# Patient Record
Sex: FEMALE | NOT HISPANIC OR LATINO | Employment: OTHER | URBAN - NONMETROPOLITAN AREA
[De-identification: names, ages, dates, MRNs, and addresses within clinical notes are randomized per-mention and may not be internally consistent; named-entity substitution may affect disease eponyms.]

---

## 2018-02-01 ENCOUNTER — APPOINTMENT (OUTPATIENT)
Dept: GENERAL RADIOLOGY | Facility: HOSPITAL | Age: 83
End: 2018-02-01

## 2018-02-01 ENCOUNTER — APPOINTMENT (OUTPATIENT)
Dept: CT IMAGING | Facility: HOSPITAL | Age: 83
End: 2018-02-01

## 2018-02-01 ENCOUNTER — HOSPITAL ENCOUNTER (INPATIENT)
Facility: HOSPITAL | Age: 83
LOS: 6 days | Discharge: NURSING, REHAB OR LONG-TERM CARE | End: 2018-02-07
Attending: EMERGENCY MEDICINE | Admitting: INTERNAL MEDICINE

## 2018-02-01 DIAGNOSIS — Z74.09 IMPAIRED MOBILITY AND ADLS: ICD-10-CM

## 2018-02-01 DIAGNOSIS — J18.9 PNEUMONIA OF BOTH LUNGS DUE TO INFECTIOUS ORGANISM, UNSPECIFIED PART OF LUNG: ICD-10-CM

## 2018-02-01 DIAGNOSIS — R41.89 IMPAIRED COGNITION: ICD-10-CM

## 2018-02-01 DIAGNOSIS — R26.9 ABNORMALITY OF GAIT AND MOBILITY: ICD-10-CM

## 2018-02-01 DIAGNOSIS — S72.002A CLOSED FRACTURE OF LEFT HIP, INITIAL ENCOUNTER (HCC): ICD-10-CM
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
## 2018-02-01 DIAGNOSIS — R13.10 DYSPHAGIA, UNSPECIFIED TYPE: ICD-10-CM

## 2018-02-01 DIAGNOSIS — N39.0 ACUTE UTI: ICD-10-CM

## 2018-02-01 DIAGNOSIS — Z78.9 IMPAIRED MOBILITY AND ADLS: ICD-10-CM

## 2018-02-01 LAB
ABO GROUP BLD: NORMAL
ALBUMIN SERPL-MCNC: 3.7 G/DL (ref 3.5–5)
ALBUMIN/GLOB SERPL: 0.9 G/DL (ref 1.1–2.5)
ALP SERPL-CCNC: 94 U/L (ref 24–120)
ALT SERPL W P-5'-P-CCNC: 36 U/L (ref 0–54)
ANION GAP SERPL CALCULATED.3IONS-SCNC: 15 MMOL/L (ref 4–13)
APTT PPP: 32 SECONDS (ref 24.1–34.8)
AST SERPL-CCNC: 64 U/L (ref 7–45)
BACTERIA UR QL AUTO: ABNORMAL /HPF
BASOPHILS # BLD AUTO: 0.02 10*3/MM3 (ref 0–0.2)
BASOPHILS NFR BLD AUTO: 0.1 % (ref 0–2)
BILIRUB SERPL-MCNC: 0.9 MG/DL (ref 0.1–1)
BILIRUB UR QL STRIP: NEGATIVE
BLD GP AB SCN SERPL QL: NEGATIVE
BUN BLD-MCNC: 34 MG/DL (ref 5–21)
BUN/CREAT SERPL: 40 (ref 7–25)
CALCIUM SPEC-SCNC: 8.7 MG/DL (ref 8.4–10.4)
CHLORIDE SERPL-SCNC: 97 MMOL/L (ref 98–110)
CK SERPL-CCNC: 746 U/L (ref 0–203)
CLARITY UR: CLEAR
CO2 SERPL-SCNC: 27 MMOL/L (ref 24–31)
COLOR UR: YELLOW
CREAT BLD-MCNC: 0.85 MG/DL (ref 0.5–1.4)
DEPRECATED RDW RBC AUTO: 48.9 FL (ref 40–54)
EOSINOPHIL # BLD AUTO: 0 10*3/MM3 (ref 0–0.7)
EOSINOPHIL NFR BLD AUTO: 0 % (ref 0–4)
ERYTHROCYTE [DISTWIDTH] IN BLOOD BY AUTOMATED COUNT: 14.2 % (ref 12–15)
GFR SERPL CREATININE-BSD FRML MDRD: 63 ML/MIN/1.73
GLOBULIN UR ELPH-MCNC: 3.9 GM/DL
GLUCOSE BLD-MCNC: 117 MG/DL (ref 70–100)
GLUCOSE UR STRIP-MCNC: NEGATIVE MG/DL
HCT VFR BLD AUTO: 36.8 % (ref 37–47)
HGB BLD-MCNC: 11.9 G/DL (ref 12–16)
HGB UR QL STRIP.AUTO: ABNORMAL
HYALINE CASTS UR QL AUTO: ABNORMAL /LPF
IMM GRANULOCYTES # BLD: 0.05 10*3/MM3 (ref 0–0.03)
IMM GRANULOCYTES NFR BLD: 0.3 % (ref 0–5)
INR PPP: 1.06 (ref 0.91–1.09)
KETONES UR QL STRIP: ABNORMAL
LEUKOCYTE ESTERASE UR QL STRIP.AUTO: ABNORMAL
LYMPHOCYTES # BLD AUTO: 1.07 10*3/MM3 (ref 0.72–4.86)
LYMPHOCYTES NFR BLD AUTO: 6.9 % (ref 15–45)
MCH RBC QN AUTO: 30.8 PG (ref 28–32)
MCHC RBC AUTO-ENTMCNC: 32.3 G/DL (ref 33–36)
MCV RBC AUTO: 95.3 FL (ref 82–98)
MONOCYTES # BLD AUTO: 0.79 10*3/MM3 (ref 0.19–1.3)
MONOCYTES NFR BLD AUTO: 5.1 % (ref 4–12)
NEUTROPHILS # BLD AUTO: 13.59 10*3/MM3 (ref 1.87–8.4)
NEUTROPHILS NFR BLD AUTO: 87.6 % (ref 39–78)
NITRITE UR QL STRIP: POSITIVE
NRBC BLD MANUAL-RTO: 0 /100 WBC (ref 0–0)
PH UR STRIP.AUTO: 5.5 [PH] (ref 5–8)
PLATELET # BLD AUTO: 460 10*3/MM3 (ref 130–400)
PMV BLD AUTO: 9.2 FL (ref 6–12)
POTASSIUM BLD-SCNC: 4.4 MMOL/L (ref 3.5–5.3)
PROT SERPL-MCNC: 7.6 G/DL (ref 6.3–8.7)
PROT UR QL STRIP: ABNORMAL
PROTHROMBIN TIME: 14.1 SECONDS (ref 11.9–14.6)
RBC # BLD AUTO: 3.86 10*6/MM3 (ref 4.2–5.4)
RBC # UR: ABNORMAL /HPF
REF LAB TEST METHOD: ABNORMAL
RH BLD: POSITIVE
SODIUM BLD-SCNC: 139 MMOL/L (ref 135–145)
SP GR UR STRIP: 1.02 (ref 1–1.03)
SQUAMOUS #/AREA URNS HPF: ABNORMAL /HPF
UROBILINOGEN UR QL STRIP: ABNORMAL
WBC NRBC COR # BLD: 15.52 10*3/MM3 (ref 4.8–10.8)
WBC UR QL AUTO: ABNORMAL /HPF

## 2018-02-01 PROCEDURE — 99284 EMERGENCY DEPT VISIT MOD MDM: CPT

## 2018-02-01 PROCEDURE — 73502 X-RAY EXAM HIP UNI 2-3 VIEWS: CPT

## 2018-02-01 PROCEDURE — 87086 URINE CULTURE/COLONY COUNT: CPT | Performed by: EMERGENCY MEDICINE

## 2018-02-01 PROCEDURE — 71045 X-RAY EXAM CHEST 1 VIEW: CPT

## 2018-02-01 PROCEDURE — 87186 SC STD MICRODIL/AGAR DIL: CPT | Performed by: EMERGENCY MEDICINE

## 2018-02-01 PROCEDURE — 80053 COMPREHEN METABOLIC PANEL: CPT | Performed by: EMERGENCY MEDICINE

## 2018-02-01 PROCEDURE — 87088 URINE BACTERIA CULTURE: CPT | Performed by: EMERGENCY MEDICINE

## 2018-02-01 PROCEDURE — 86900 BLOOD TYPING SEROLOGIC ABO: CPT | Performed by: EMERGENCY MEDICINE

## 2018-02-01 PROCEDURE — 85730 THROMBOPLASTIN TIME PARTIAL: CPT | Performed by: EMERGENCY MEDICINE

## 2018-02-01 PROCEDURE — 82550 ASSAY OF CK (CPK): CPT | Performed by: EMERGENCY MEDICINE

## 2018-02-01 PROCEDURE — 25010000002 ENOXAPARIN PER 10 MG: Performed by: INTERNAL MEDICINE

## 2018-02-01 PROCEDURE — 25010000002 CEFTRIAXONE PER 250 MG: Performed by: EMERGENCY MEDICINE

## 2018-02-01 PROCEDURE — P9612 CATHETERIZE FOR URINE SPEC: HCPCS

## 2018-02-01 PROCEDURE — 86901 BLOOD TYPING SEROLOGIC RH(D): CPT | Performed by: EMERGENCY MEDICINE

## 2018-02-01 PROCEDURE — 70450 CT HEAD/BRAIN W/O DYE: CPT

## 2018-02-01 PROCEDURE — 86850 RBC ANTIBODY SCREEN: CPT | Performed by: EMERGENCY MEDICINE

## 2018-02-01 PROCEDURE — 85025 COMPLETE CBC W/AUTO DIFF WBC: CPT | Performed by: EMERGENCY MEDICINE

## 2018-02-01 PROCEDURE — 85610 PROTHROMBIN TIME: CPT | Performed by: EMERGENCY MEDICINE

## 2018-02-01 PROCEDURE — 87040 BLOOD CULTURE FOR BACTERIA: CPT | Performed by: EMERGENCY MEDICINE

## 2018-02-01 PROCEDURE — 81001 URINALYSIS AUTO W/SCOPE: CPT | Performed by: EMERGENCY MEDICINE

## 2018-02-01 RX ORDER — NALOXONE HCL 0.4 MG/ML
0.4 VIAL (ML) INJECTION
Status: DISCONTINUED | OUTPATIENT
Start: 2018-02-01 | End: 2018-02-07 | Stop reason: HOSPADM

## 2018-02-01 RX ORDER — ONDANSETRON 2 MG/ML
4 INJECTION INTRAMUSCULAR; INTRAVENOUS EVERY 6 HOURS PRN
Status: DISCONTINUED | OUTPATIENT
Start: 2018-02-01 | End: 2018-02-07 | Stop reason: HOSPADM

## 2018-02-01 RX ORDER — SODIUM CHLORIDE 0.9 % (FLUSH) 0.9 %
1-10 SYRINGE (ML) INJECTION AS NEEDED
Status: DISCONTINUED | OUTPATIENT
Start: 2018-02-01 | End: 2018-02-07 | Stop reason: HOSPADM

## 2018-02-01 RX ORDER — BISOPROLOL FUMARATE AND HYDROCHLOROTHIAZIDE 5; 6.25 MG/1; MG/1
1 TABLET ORAL DAILY
COMMUNITY
End: 2020-08-17 | Stop reason: HOSPADM

## 2018-02-01 RX ORDER — MORPHINE SULFATE 4 MG/ML
4 INJECTION, SOLUTION INTRAMUSCULAR; INTRAVENOUS EVERY 4 HOURS PRN
Status: DISCONTINUED | OUTPATIENT
Start: 2018-02-01 | End: 2018-02-07 | Stop reason: HOSPADM

## 2018-02-01 RX ORDER — SODIUM CHLORIDE 0.9 % (FLUSH) 0.9 %
10 SYRINGE (ML) INJECTION AS NEEDED
Status: DISCONTINUED | OUTPATIENT
Start: 2018-02-01 | End: 2018-02-07 | Stop reason: HOSPADM

## 2018-02-01 RX ORDER — LEVOTHYROXINE SODIUM 0.1 MG/1
100 TABLET ORAL
Status: DISCONTINUED | OUTPATIENT
Start: 2018-02-01 | End: 2018-02-07 | Stop reason: HOSPADM

## 2018-02-01 RX ORDER — LEVOTHYROXINE SODIUM 125 UG/1
125 CAPSULE ORAL DAILY
COMMUNITY
End: 2020-08-17 | Stop reason: HOSPADM

## 2018-02-01 RX ORDER — ATORVASTATIN CALCIUM 20 MG/1
20 TABLET, FILM COATED ORAL DAILY
Status: ON HOLD | COMMUNITY
End: 2020-08-11

## 2018-02-01 RX ADMIN — LEVOTHYROXINE SODIUM 100 MCG: 100 TABLET ORAL at 20:38

## 2018-02-01 RX ADMIN — BISOPROLOL FUMARATE: 5 TABLET ORAL at 20:38

## 2018-02-01 RX ADMIN — CEFTRIAXONE SODIUM 1 G: 1 INJECTION, POWDER, FOR SOLUTION INTRAMUSCULAR; INTRAVENOUS at 17:14

## 2018-02-01 RX ADMIN — ENOXAPARIN SODIUM 30 MG: 30 INJECTION SUBCUTANEOUS at 20:41

## 2018-02-01 NOTE — ED PROVIDER NOTES
Subjective patient is an 88-year-old female who presents to the ER status post a fall.  Per EMS, patient was found lying in her floor today by her neighbors.  Patient states she fell sometime yesterday but could not get out of the floor.  Patient complains of left hip pain.  Patient states she did not hit her head or have any loss of consciousness but she is unsure of how she fell.  Patient does live alone.  Patient denies any other complaints.  She denies any fever, chest pain, shortness of air, abdominal pain, nausea vomiting diarrhea, urinary changes, neurological changes, neck or back pain, other ext pain.  Patient is a poor historian.    History provided by:  Patient   used: No        Review of Systems   Constitutional: Negative.    HENT: Negative.    Eyes: Negative.    Respiratory: Negative.    Cardiovascular: Negative.    Gastrointestinal: Negative.    Endocrine: Negative.    Genitourinary: Negative.    Musculoskeletal: Positive for arthralgias and myalgias.   Skin: Negative.    Allergic/Immunologic: Negative.    Neurological: Negative.    Hematological: Negative.    Psychiatric/Behavioral: Negative.    All other systems reviewed and are negative.      No past medical history on file.    No Known Allergies    No past surgical history on file.    No family history on file.    Social History     Social History   • Marital status:      Spouse name: N/A   • Number of children: N/A   • Years of education: N/A     Social History Main Topics   • Smoking status: Not on file   • Smokeless tobacco: Not on file   • Alcohol use Not on file   • Drug use: Not on file   • Sexual activity: Not on file     Other Topics Concern   • Not on file     Social History Narrative           Objective   Physical Exam   Constitutional: She appears well-developed and well-nourished.   Smells of urine   HENT:   Head: Normocephalic and atraumatic.   Eyes: Conjunctivae are normal. Pupils are equal, round, and  reactive to light.   Neck: Normal range of motion.   Cardiovascular: Normal rate, regular rhythm and normal heart sounds.    Pulmonary/Chest: Effort normal and breath sounds normal.   Abdominal: Soft. There is no tenderness.   Genitourinary:   Genitourinary Comments: Erythema and excoriation to the labia and perineum   Musculoskeletal: Normal range of motion.   Left lower extremity is shortened and externally rotated, tender to palpation left lateral hip, ecchymosis to the bilateral forearms and elbows, nontender to palpation elsewhere, neurovascularly intact, no CT or L-spine tenderness   Neurological: She is alert. She has normal strength.   Skin: Skin is warm.   3+ pitting edema BLE   Psychiatric: She has a normal mood and affect. Her behavior is normal.   Nursing note and vitals reviewed.      Procedures         ED Course  ED Course      Lab Results (last 24 hours)     Procedure Component Value Units Date/Time    CBC & Differential [[id number]] Collected:  02/01/18 1510    Specimen:  Blood Updated:  02/01/18 1519    Narrative:       The following orders were created for panel order CBC & Differential.  Procedure                               Abnormality         Status                     ---------                               -----------         ------                     CBC Auto Differential[[id number]]         Abnormal            Final result                 Please view results for these tests on the individual orders.    Comprehensive Metabolic Panel [[id number]]  (Abnormal) Collected:  02/01/18 1510    Specimen:  Blood Updated:  02/01/18 1531     Glucose 117 (H) mg/dL      BUN 34 (H) mg/dL      Creatinine 0.85 mg/dL      Sodium 139 mmol/L      Potassium 4.4 mmol/L      Chloride 97 (L) mmol/L      CO2 27.0 mmol/L      Calcium 8.7 mg/dL      Total Protein 7.6 g/dL      Albumin 3.70 g/dL      ALT (SGPT) 36 U/L      AST (SGOT) 64 (H) U/L      Alkaline Phosphatase 94 U/L      Total Bilirubin 0.9 mg/dL      eGFR Non   Amer 63 mL/min/1.73      Globulin 3.9 gm/dL      A/G Ratio 0.9 (L) g/dL      BUN/Creatinine Ratio 40.0 (H)     Anion Gap 15.0 (H) mmol/L     Narrative:       The MDRD GFR formula is only valid for adults with stable renal function between ages 18 and 70.    CK [[id number]]  (Abnormal) Collected:  02/01/18 1510    Specimen:  Blood Updated:  02/01/18 1531     Creatine Kinase 746 (H) U/L     Protime-INR [[id number]]  (Normal) Collected:  02/01/18 1510    Specimen:  Blood Updated:  02/01/18 1528     Protime 14.1 Seconds      INR 1.06    aPTT [[id number]]  (Normal) Collected:  02/01/18 1510    Specimen:  Blood Updated:  02/01/18 1528     PTT 32.0 seconds     CBC Auto Differential [[id number]]  (Abnormal) Collected:  02/01/18 1510    Specimen:  Blood Updated:  02/01/18 1519     WBC 15.52 (H) 10*3/mm3      RBC 3.86 (L) 10*6/mm3      Hemoglobin 11.9 (L) g/dL      Hematocrit 36.8 (L) %      MCV 95.3 fL      MCH 30.8 pg      MCHC 32.3 (L) g/dL      RDW 14.2 %      RDW-SD 48.9 fl      MPV 9.2 fL      Platelets 460 (H) 10*3/mm3      Neutrophil % 87.6 (H) %      Lymphocyte % 6.9 (L) %      Monocyte % 5.1 %      Eosinophil % 0.0 %      Basophil % 0.1 %      Immature Grans % 0.3 %      Neutrophils, Absolute 13.59 (H) 10*3/mm3      Lymphocytes, Absolute 1.07 10*3/mm3      Monocytes, Absolute 0.79 10*3/mm3      Eosinophils, Absolute 0.00 10*3/mm3      Basophils, Absolute 0.02 10*3/mm3      Immature Grans, Absolute 0.05 (H) 10*3/mm3      nRBC 0.0 /100 WBC     Urinalysis With / Culture If Indicated - Urine, Catheter [[id number]]  (Abnormal) Collected:  02/01/18 1656    Specimen:  Urine from Urine, Catheter Updated:  02/01/18 1706     Color, UA Yellow     Appearance, UA Clear     pH, UA 5.5     Specific Gravity, UA 1.024     Glucose, UA Negative     Ketones, UA 15 mg/dL (1+) (A)     Bilirubin, UA Negative     Blood, UA Small (1+) (A)     Protein, UA 30 mg/dL (1+) (A)     Leuk Esterase, UA Trace (A)     Nitrite, UA Positive (A)      Urobilinogen, UA 0.2 E.U./dL    Urine Culture - Urine, Urine, Clean Catch [[id number]] Collected:  02/01/18 1656    Specimen:  Urine from Urine, Catheter Updated:  02/01/18 1704    Urinalysis, Microscopic Only - Urine, Clean Catch [[id number]]  (Abnormal) Collected:  02/01/18 1656    Specimen:  Urine from Urine, Catheter Updated:  02/01/18 1706     RBC, UA 0-2 (A) /HPF      WBC, UA 13-20 (A) /HPF      Bacteria, UA 4+ (A) /HPF      Squamous Epithelial Cells, UA 3-6 (A) /HPF      Hyaline Casts, UA 3-6 /LPF      Methodology Automated Microscopy    Blood Culture - Blood, [[id number]] Collected:  02/01/18 1708    Specimen:  Blood from Arm, Right Updated:  02/01/18 1721    Blood Culture - Blood, [[id number]] Collected:  02/01/18 1713    Specimen:  Blood from Arm, Left Updated:  02/01/18 1721        XR Chest 1 View   Final Result   Impression: Patchy opacities bilaterally are concerning for an   infectious or inflammatory process.   This report was finalized on 02/01/2018 16:13 by Dr. Cody Stanley MD.      XR Hip With or Without Pelvis 2 - 3 View Left   Final Result   1. Impacted left subcapital femoral neck fracture.   This report was finalized on 02/01/2018 16:13 by Dr. Shasta Crawford MD.      CT Head Without Contrast   Final Result   1. Mild to moderate cortical atrophy, greatest within the frontal   region. No acute intracranial abnormality.   2. Persistent chronic sphenoid opacification, chronic sinusitis.   This report was finalized on 02/01/2018 15:44 by Dr. Shasta Crawford MD.      Labs showed leukocytosis.  CT head showed chronic sinusitis but was otherwise negative.  Chest x-ray showed bilateral patchy opacities concerning for pneumonia.  Blood cultures were obtained and patient was given Rocephin.  Left hip x-ray showed an impacted left subcapital femoral neck fracture.  Discussed case with Dr. Barger with orthopedic surgery.  He plans to operate on the patient tomorrow.  Patient is to be nothing by mouth after  midnight.  Urinalysis also positive for urinary tract infection.  Urine culture Sent.  Patient was then admitted to Dr Myles service for further treatment.            MDM  Number of Diagnoses or Management Options      Final diagnoses:   Fall, initial encounter   Closed fracture of left hip, initial encounter   Pneumonia of both lungs due to infectious organism, unspecified part of lung   Acute UTI            Gabriela Barrett MD  02/01/18 7898       Gabriela Barrett MD  02/01/18 6850

## 2018-02-02 ENCOUNTER — ANESTHESIA EVENT (OUTPATIENT)
Dept: PERIOP | Facility: HOSPITAL | Age: 83
End: 2018-02-02

## 2018-02-02 ENCOUNTER — ANESTHESIA (OUTPATIENT)
Dept: PERIOP | Facility: HOSPITAL | Age: 83
End: 2018-02-02

## 2018-02-02 LAB
25(OH)D3 SERPL-MCNC: 14.5 NG/ML (ref 30–100)
ANION GAP SERPL CALCULATED.3IONS-SCNC: 13 MMOL/L (ref 4–13)
BACTERIA SPEC AEROBE CULT: ABNORMAL
BACTERIA SPEC AEROBE CULT: ABNORMAL
BUN BLD-MCNC: 32 MG/DL (ref 5–21)
BUN/CREAT SERPL: 32 (ref 7–25)
CALCIUM SPEC-SCNC: 8.7 MG/DL (ref 8.4–10.4)
CHLORIDE SERPL-SCNC: 99 MMOL/L (ref 98–110)
CO2 SERPL-SCNC: 28 MMOL/L (ref 24–31)
CREAT BLD-MCNC: 1 MG/DL (ref 0.5–1.4)
DEPRECATED RDW RBC AUTO: 49.8 FL (ref 40–54)
ERYTHROCYTE [DISTWIDTH] IN BLOOD BY AUTOMATED COUNT: 14 % (ref 12–15)
FOLATE SERPL-MCNC: >20 NG/ML
GFR SERPL CREATININE-BSD FRML MDRD: 52 ML/MIN/1.73
GLUCOSE BLD-MCNC: 80 MG/DL (ref 70–100)
HCT VFR BLD AUTO: 34.4 % (ref 37–47)
HGB BLD-MCNC: 11.1 G/DL (ref 12–16)
IRON 24H UR-MRATE: 28 MCG/DL (ref 42–180)
IRON SATN MFR SERPL: 11 % (ref 20–45)
MCH RBC QN AUTO: 31.1 PG (ref 28–32)
MCHC RBC AUTO-ENTMCNC: 32.3 G/DL (ref 33–36)
MCV RBC AUTO: 96.4 FL (ref 82–98)
PLATELET # BLD AUTO: 443 10*3/MM3 (ref 130–400)
PMV BLD AUTO: 9.3 FL (ref 6–12)
POTASSIUM BLD-SCNC: 3.9 MMOL/L (ref 3.5–5.3)
RBC # BLD AUTO: 3.57 10*6/MM3 (ref 4.2–5.4)
SODIUM BLD-SCNC: 140 MMOL/L (ref 135–145)
TIBC SERPL-MCNC: 261 MCG/DL (ref 225–420)
VIT B12 BLD-MCNC: 830 PG/ML (ref 239–931)
WBC NRBC COR # BLD: 13.94 10*3/MM3 (ref 4.8–10.8)

## 2018-02-02 PROCEDURE — 82607 VITAMIN B-12: CPT | Performed by: INTERNAL MEDICINE

## 2018-02-02 PROCEDURE — 25010000002 NEOSTIGMINE PER 0.5 MG: Performed by: NURSE ANESTHETIST, CERTIFIED REGISTERED

## 2018-02-02 PROCEDURE — 82306 VITAMIN D 25 HYDROXY: CPT | Performed by: INTERNAL MEDICINE

## 2018-02-02 PROCEDURE — 0SRS0JA REPLACEMENT OF LEFT HIP JOINT, FEMORAL SURFACE WITH SYNTHETIC SUBSTITUTE, UNCEMENTED, OPEN APPROACH: ICD-10-PCS | Performed by: ORTHOPAEDIC SURGERY

## 2018-02-02 PROCEDURE — 83550 IRON BINDING TEST: CPT | Performed by: INTERNAL MEDICINE

## 2018-02-02 PROCEDURE — 25010000003 CEFAZOLIN PER 500 MG: Performed by: ORTHOPAEDIC SURGERY

## 2018-02-02 PROCEDURE — 25010000002 FENTANYL CITRATE (PF) 250 MCG/5ML SOLUTION: Performed by: NURSE ANESTHETIST, CERTIFIED REGISTERED

## 2018-02-02 PROCEDURE — 83540 ASSAY OF IRON: CPT | Performed by: INTERNAL MEDICINE

## 2018-02-02 PROCEDURE — 80048 BASIC METABOLIC PNL TOTAL CA: CPT | Performed by: INTERNAL MEDICINE

## 2018-02-02 PROCEDURE — 25010000002 PROPOFOL 10 MG/ML EMULSION: Performed by: NURSE ANESTHETIST, CERTIFIED REGISTERED

## 2018-02-02 PROCEDURE — 25010000002 SUCCINYLCHOLINE PER 20 MG: Performed by: NURSE ANESTHETIST, CERTIFIED REGISTERED

## 2018-02-02 PROCEDURE — 85027 COMPLETE CBC AUTOMATED: CPT | Performed by: INTERNAL MEDICINE

## 2018-02-02 PROCEDURE — C1776 JOINT DEVICE (IMPLANTABLE): HCPCS | Performed by: ORTHOPAEDIC SURGERY

## 2018-02-02 PROCEDURE — 82746 ASSAY OF FOLIC ACID SERUM: CPT | Performed by: INTERNAL MEDICINE

## 2018-02-02 PROCEDURE — 25010000003 CEFAZOLIN PER 500 MG: Performed by: NURSE ANESTHETIST, CERTIFIED REGISTERED

## 2018-02-02 PROCEDURE — 93010 ELECTROCARDIOGRAM REPORT: CPT | Performed by: INTERNAL MEDICINE

## 2018-02-02 PROCEDURE — 93005 ELECTROCARDIOGRAM TRACING: CPT | Performed by: ORTHOPAEDIC SURGERY

## 2018-02-02 DEVICE — PRT HIP BIPOL PRIM DEPUY 9525109/9525107: Type: IMPLANTABLE DEVICE | Status: FUNCTIONAL

## 2018-02-02 DEVICE — TRI-LOCK BPS FEMORAL STEM 12/14 TAPER TRI-LOCK BPS W/GRIPTION SIZE 2 STD 99MM
Type: IMPLANTABLE DEVICE | Status: FUNCTIONAL
Brand: TRI-LOCK GRIPTION

## 2018-02-02 DEVICE — SELF CENTERING BI-POLAR HEAD 28MM ID 43MM OD
Type: IMPLANTABLE DEVICE | Status: FUNCTIONAL
Brand: SELF CENTERING

## 2018-02-02 DEVICE — ARTICUL/EZE FEMORAL HEAD DIAMETER 28MM +5 12/14 TAPER
Type: IMPLANTABLE DEVICE | Status: FUNCTIONAL
Brand: ARTICUL/EZE

## 2018-02-02 RX ORDER — MAGNESIUM HYDROXIDE 1200 MG/15ML
LIQUID ORAL AS NEEDED
Status: DISCONTINUED | OUTPATIENT
Start: 2018-02-02 | End: 2018-02-02 | Stop reason: HOSPADM

## 2018-02-02 RX ORDER — ROCURONIUM BROMIDE 10 MG/ML
INJECTION, SOLUTION INTRAVENOUS AS NEEDED
Status: DISCONTINUED | OUTPATIENT
Start: 2018-02-02 | End: 2018-02-02 | Stop reason: SURG

## 2018-02-02 RX ORDER — MEPERIDINE HYDROCHLORIDE 25 MG/ML
12.5 INJECTION INTRAMUSCULAR; INTRAVENOUS; SUBCUTANEOUS
Status: DISCONTINUED | OUTPATIENT
Start: 2018-02-02 | End: 2018-02-02 | Stop reason: HOSPADM

## 2018-02-02 RX ORDER — PHENYLEPHRINE HCL IN 0.9% NACL 0.8MG/10ML
SYRINGE (ML) INTRAVENOUS AS NEEDED
Status: DISCONTINUED | OUTPATIENT
Start: 2018-02-02 | End: 2018-02-02 | Stop reason: SURG

## 2018-02-02 RX ORDER — PROPOFOL 10 MG/ML
VIAL (ML) INTRAVENOUS AS NEEDED
Status: DISCONTINUED | OUTPATIENT
Start: 2018-02-02 | End: 2018-02-02 | Stop reason: SURG

## 2018-02-02 RX ORDER — NALOXONE HCL 0.4 MG/ML
0.4 VIAL (ML) INJECTION AS NEEDED
Status: DISCONTINUED | OUTPATIENT
Start: 2018-02-02 | End: 2018-02-02 | Stop reason: HOSPADM

## 2018-02-02 RX ORDER — FENTANYL CITRATE 50 UG/ML
25 INJECTION, SOLUTION INTRAMUSCULAR; INTRAVENOUS
Status: DISCONTINUED | OUTPATIENT
Start: 2018-02-02 | End: 2018-02-02 | Stop reason: HOSPADM

## 2018-02-02 RX ORDER — ONDANSETRON 4 MG/1
4 TABLET, FILM COATED ORAL EVERY 6 HOURS PRN
Status: DISCONTINUED | OUTPATIENT
Start: 2018-02-02 | End: 2018-02-07 | Stop reason: HOSPADM

## 2018-02-02 RX ORDER — ONDANSETRON 2 MG/ML
4 INJECTION INTRAMUSCULAR; INTRAVENOUS ONCE AS NEEDED
Status: DISCONTINUED | OUTPATIENT
Start: 2018-02-02 | End: 2018-02-02 | Stop reason: HOSPADM

## 2018-02-02 RX ORDER — SODIUM CHLORIDE, SODIUM LACTATE, POTASSIUM CHLORIDE, CALCIUM CHLORIDE 600; 310; 30; 20 MG/100ML; MG/100ML; MG/100ML; MG/100ML
9 INJECTION, SOLUTION INTRAVENOUS CONTINUOUS
Status: DISCONTINUED | OUTPATIENT
Start: 2018-02-02 | End: 2018-02-04

## 2018-02-02 RX ORDER — FERROUS SULFATE 325(65) MG
325 TABLET ORAL 2 TIMES DAILY WITH MEALS
Status: DISCONTINUED | OUTPATIENT
Start: 2018-02-02 | End: 2018-02-07 | Stop reason: HOSPADM

## 2018-02-02 RX ORDER — DIPHENHYDRAMINE HYDROCHLORIDE 50 MG/ML
12.5 INJECTION INTRAMUSCULAR; INTRAVENOUS
Status: DISCONTINUED | OUTPATIENT
Start: 2018-02-02 | End: 2018-02-02 | Stop reason: HOSPADM

## 2018-02-02 RX ORDER — CEFAZOLIN SODIUM 1 G/3ML
INJECTION, POWDER, FOR SOLUTION INTRAMUSCULAR; INTRAVENOUS AS NEEDED
Status: DISCONTINUED | OUTPATIENT
Start: 2018-02-02 | End: 2018-02-02 | Stop reason: SURG

## 2018-02-02 RX ORDER — GLYCOPYRROLATE 0.2 MG/ML
INJECTION INTRAMUSCULAR; INTRAVENOUS AS NEEDED
Status: DISCONTINUED | OUTPATIENT
Start: 2018-02-02 | End: 2018-02-02 | Stop reason: SURG

## 2018-02-02 RX ORDER — ONDANSETRON 2 MG/ML
4 INJECTION INTRAMUSCULAR; INTRAVENOUS EVERY 6 HOURS PRN
Status: DISCONTINUED | OUTPATIENT
Start: 2018-02-02 | End: 2018-02-07 | Stop reason: HOSPADM

## 2018-02-02 RX ORDER — SODIUM CHLORIDE 9 MG/ML
100 INJECTION, SOLUTION INTRAVENOUS CONTINUOUS
Status: DISCONTINUED | OUTPATIENT
Start: 2018-02-02 | End: 2018-02-04

## 2018-02-02 RX ORDER — HYDRALAZINE HYDROCHLORIDE 20 MG/ML
5 INJECTION INTRAMUSCULAR; INTRAVENOUS
Status: DISCONTINUED | OUTPATIENT
Start: 2018-02-02 | End: 2018-02-02 | Stop reason: HOSPADM

## 2018-02-02 RX ORDER — NALOXONE HCL 0.4 MG/ML
0.4 VIAL (ML) INJECTION
Status: DISCONTINUED | OUTPATIENT
Start: 2018-02-02 | End: 2018-02-07 | Stop reason: HOSPADM

## 2018-02-02 RX ORDER — LABETALOL HYDROCHLORIDE 5 MG/ML
5 INJECTION, SOLUTION INTRAVENOUS
Status: DISCONTINUED | OUTPATIENT
Start: 2018-02-02 | End: 2018-02-02 | Stop reason: HOSPADM

## 2018-02-02 RX ORDER — SODIUM CHLORIDE 0.9 % (FLUSH) 0.9 %
1-10 SYRINGE (ML) INJECTION AS NEEDED
Status: DISCONTINUED | OUTPATIENT
Start: 2018-02-02 | End: 2018-02-02 | Stop reason: HOSPADM

## 2018-02-02 RX ORDER — MORPHINE SULFATE 2 MG/ML
2 INJECTION, SOLUTION INTRAMUSCULAR; INTRAVENOUS
Status: DISCONTINUED | OUTPATIENT
Start: 2018-02-02 | End: 2018-02-02 | Stop reason: HOSPADM

## 2018-02-02 RX ORDER — FENTANYL CITRATE 50 UG/ML
INJECTION, SOLUTION INTRAMUSCULAR; INTRAVENOUS AS NEEDED
Status: DISCONTINUED | OUTPATIENT
Start: 2018-02-02 | End: 2018-02-02 | Stop reason: SURG

## 2018-02-02 RX ORDER — SUCCINYLCHOLINE CHLORIDE 20 MG/ML
INJECTION INTRAMUSCULAR; INTRAVENOUS AS NEEDED
Status: DISCONTINUED | OUTPATIENT
Start: 2018-02-02 | End: 2018-02-02 | Stop reason: SURG

## 2018-02-02 RX ORDER — HYDROCODONE BITARTRATE AND ACETAMINOPHEN 10; 325 MG/1; MG/1
1 TABLET ORAL EVERY 4 HOURS PRN
Status: DISCONTINUED | OUTPATIENT
Start: 2018-02-02 | End: 2018-02-07 | Stop reason: HOSPADM

## 2018-02-02 RX ORDER — IPRATROPIUM BROMIDE AND ALBUTEROL SULFATE 2.5; .5 MG/3ML; MG/3ML
3 SOLUTION RESPIRATORY (INHALATION) ONCE AS NEEDED
Status: DISCONTINUED | OUTPATIENT
Start: 2018-02-02 | End: 2018-02-02 | Stop reason: HOSPADM

## 2018-02-02 RX ORDER — FENTANYL CITRATE 50 UG/ML
INJECTION, SOLUTION INTRAMUSCULAR; INTRAVENOUS AS NEEDED
Status: DISCONTINUED | OUTPATIENT
Start: 2018-02-02 | End: 2018-02-02

## 2018-02-02 RX ORDER — MORPHINE SULFATE 2 MG/ML
1 INJECTION, SOLUTION INTRAMUSCULAR; INTRAVENOUS EVERY 4 HOURS PRN
Status: DISCONTINUED | OUTPATIENT
Start: 2018-02-02 | End: 2018-02-07 | Stop reason: HOSPADM

## 2018-02-02 RX ORDER — DEXTROSE MONOHYDRATE 25 G/50ML
12.5 INJECTION, SOLUTION INTRAVENOUS AS NEEDED
Status: DISCONTINUED | OUTPATIENT
Start: 2018-02-02 | End: 2018-02-02 | Stop reason: HOSPADM

## 2018-02-02 RX ORDER — HYDROCODONE BITARTRATE AND ACETAMINOPHEN 5; 325 MG/1; MG/1
1 TABLET ORAL EVERY 4 HOURS PRN
Status: DISCONTINUED | OUTPATIENT
Start: 2018-02-02 | End: 2018-02-07 | Stop reason: HOSPADM

## 2018-02-02 RX ADMIN — FENTANYL CITRATE 100 MCG: 50 INJECTION INTRAMUSCULAR; INTRAVENOUS at 14:41

## 2018-02-02 RX ADMIN — Medication 160 MCG: at 15:25

## 2018-02-02 RX ADMIN — CEFAZOLIN 1 G: 1 INJECTION, POWDER, FOR SOLUTION INTRAVENOUS at 14:46

## 2018-02-02 RX ADMIN — SODIUM CHLORIDE 100 ML/HR: 9 INJECTION, SOLUTION INTRAVENOUS at 18:09

## 2018-02-02 RX ADMIN — EPHEDRINE SULFATE 25 MG: 50 INJECTION INTRAMUSCULAR; INTRAVENOUS; SUBCUTANEOUS at 14:47

## 2018-02-02 RX ADMIN — SUCCINYLCHOLINE CHLORIDE 60 MG: 20 INJECTION, SOLUTION INTRAMUSCULAR; INTRAVENOUS at 14:41

## 2018-02-02 RX ADMIN — ROCURONIUM BROMIDE 15 MG: 10 INJECTION INTRAVENOUS at 14:47

## 2018-02-02 RX ADMIN — GLYCOPYRROLATE 0.2 MG: 0.2 INJECTION, SOLUTION INTRAMUSCULAR; INTRAVENOUS at 14:50

## 2018-02-02 RX ADMIN — LIDOCAINE HYDROCHLORIDE 0.5 ML: 10 INJECTION, SOLUTION EPIDURAL; INFILTRATION; INTRACAUDAL; PERINEURAL at 13:48

## 2018-02-02 RX ADMIN — ROCURONIUM BROMIDE 5 MG: 10 INJECTION INTRAVENOUS at 14:41

## 2018-02-02 RX ADMIN — Medication 160 MCG: at 14:50

## 2018-02-02 RX ADMIN — Medication 160 MCG: at 15:28

## 2018-02-02 RX ADMIN — BISOPROLOL FUMARATE: 5 TABLET ORAL at 08:54

## 2018-02-02 RX ADMIN — Medication 160 MCG: at 14:55

## 2018-02-02 RX ADMIN — Medication 3 MG: at 15:55

## 2018-02-02 RX ADMIN — CEFAZOLIN 1 G: 1 INJECTION, POWDER, FOR SOLUTION INTRAVENOUS at 20:58

## 2018-02-02 RX ADMIN — FENTANYL CITRATE 50 MCG: 50 INJECTION INTRAMUSCULAR; INTRAVENOUS at 15:02

## 2018-02-02 RX ADMIN — PROPOFOL 30 MG: 10 INJECTION, EMULSION INTRAVENOUS at 14:41

## 2018-02-02 RX ADMIN — FENTANYL CITRATE 50 MCG: 50 INJECTION INTRAMUSCULAR; INTRAVENOUS at 15:11

## 2018-02-02 RX ADMIN — FENTANYL CITRATE 50 MCG: 50 INJECTION INTRAMUSCULAR; INTRAVENOUS at 15:06

## 2018-02-02 RX ADMIN — SODIUM CHLORIDE, POTASSIUM CHLORIDE, SODIUM LACTATE AND CALCIUM CHLORIDE 9 ML/HR: 600; 310; 30; 20 INJECTION, SOLUTION INTRAVENOUS at 13:49

## 2018-02-02 RX ADMIN — EPHEDRINE SULFATE 25 MG: 50 INJECTION INTRAMUSCULAR; INTRAVENOUS; SUBCUTANEOUS at 14:43

## 2018-02-02 RX ADMIN — GLYCOPYRROLATE 0.2 MG: 0.2 INJECTION, SOLUTION INTRAMUSCULAR; INTRAVENOUS at 15:55

## 2018-02-02 NOTE — OP NOTE
Orthopedic History and Physical    Pt Name: Andres Walter  MRN: [id number]  YOB: 1929  Date: 2/2/2018     PREOPERATIVE DIAGNOSIS: Left subcapital displaced femoral neck fracture.      POSTOPERATIVE DIAGNOSIS: Left subcapital displaced femoral neck fracture.      PROCEDURE: Left hip hemiarthroplasty.      SURGEON: Emory Barger MD     ASSISTANT: Mathieu Bermudez PA-C     ANESTHESIA: General endotracheal anesthesia.      ESTIMATED BLOOD LOSS: 150 mL.      COMPLICATIONS: None.      CONDITION: Stable.      NOTE: An assistant surgeon was scrubbed and present throughout the entire procedure. He assisted and aided in limb position, as well as retractor placement. He also aided in implantation of the prosthesis and was responsible for wound closure.      COMPONENTS: DePuy Tri-Lock size 2 stem with a standard neck with +5 head and a 43 mm outer bipolar cup.      HISTORY: This is an 88-year-old female who fell at home. The patient presented to the emergency department with complaints of hip pain. X-rays demonstrated a subcapital femoral neck fracture. Based on this, the decision was made to take the patient to the operating room for the above-mentioned procedure. After the risks and benefits had been discussed with the patient and the patient was medically cleared, the patient was taken to the operative suite.      DESCRIPTION OF PROCEDURE: The patient was interviewed in the preanesthesia area, where the operative hip was marked with a marking pen. The patient was then taken to the operative suite where general endotracheal anesthesia was performed by the anesthesia team. The patient was then positioned in the lateral decubitus position over a pegboard and a VACPAC. Once the pelvis was stabilized patient was then prepped and draped in a standard sterile fashion using ChloraPrep. A standard posterior approach was carried out to the hip. The short external rotators and capsule were then taken down in a T-type fashion  and marked with a #1 Vicryl suture. The femoral head was then removed and sized on the back table for a size 43.      Attention was then turned to in the femur. A proximal femoral elevator was placed with Hohmann retractors around the femur. A canal finder was used to find the canal, followed by a box cutting osteotome. We then sequentially broached up to a size 2 Tri-Lock placed in about 15° of anteversion. We did use a calcar planer to make our neck cut 1 fingerbreadth above the lesser trochanter. Once this in position and a definitive 2 femur Tri-Lock femur was then placed. Trial reductions demonstrated the +5 head gave us the most stable construct. We then placed a +% head with a 43 mm outer bipolar cup and reduced the hip. It was stable throughout an arc of motion with symmetric limb lengths.      Attention was then turned to the posterior capsule closure. The capsule and short external rotators were closed using drill bit through the trochanter with the Vargas suture passer. The sutures were passed through the trochanter and tied over a bone bridge. Side-to-side figure-of-eight sutures were then used to repair the longitudinal portion of the capsulotomy. The hip was irrigated with pulsatile lavage. The IT band was then closed with #1 Vicryl suture. Skin was approximated with Vicryl and closed with Prineo wound closure system. The patient awoke from anesthesia without difficulty and was transferred to the PACU in stable condition. All sponge, needle and instrument counts were correct at the end of the procedure.            cc:       Emory Barger M.D.

## 2018-02-02 NOTE — PLAN OF CARE
Problem: Patient Care Overview (Adult)  Goal: Plan of Care Review  Outcome: Ongoing (interventions implemented as appropriate)   02/02/18 1400   Coping/Psychosocial Response Interventions   Plan Of Care Reviewed With durable power of ;family;son   Patient Care Overview   Progress no change   Outcome Evaluation   Outcome Summary/Follow up Plan ( has updatd the son via phone . Patient is confus)       Problem: Perioperative Period (Adult)  Goal: Signs and Symptoms of Listed Potential Problems Will be Absent or Manageable (Perioperative Period)  Outcome: Ongoing (interventions implemented as appropriate)

## 2018-02-02 NOTE — PLAN OF CARE
Problem: Patient Care Overview (Adult)  Goal: Plan of Care Review  Outcome: Ongoing (interventions implemented as appropriate)   02/02/18 1532   Coping/Psychosocial Response Interventions   Plan Of Care Reviewed With patient   Patient Care Overview   Progress no change   Outcome Evaluation   Outcome Summary/Follow up Plan patient appears asleep, pacu discharge critera met       Problem: Perioperative Period (Adult)  Goal: Signs and Symptoms of Listed Potential Problems Will be Absent or Manageable (Perioperative Period)  Outcome: Ongoing (interventions implemented as appropriate)

## 2018-02-02 NOTE — NURSING NOTE
Upon arrival to patient's room Ping CALLE noted patient' right upper eyelid and below right eye bruised. Ping CALLE stated patient's eye was not like that prior to surgery. Dr. MARY Moon notifed about right eye, no orders received.

## 2018-02-02 NOTE — PLAN OF CARE
Problem: Fall Risk (Adult)  Goal: Identify Related Risk Factors and Signs and Symptoms  Outcome: Ongoing (interventions implemented as appropriate)    Goal: Absence of Falls  Outcome: Ongoing (interventions implemented as appropriate)      Problem: Pressure Ulcer Risk (To Scale) (Adult,Obstetrics,Pediatric)  Goal: Identify Related Risk Factors and Signs and Symptoms  Outcome: Ongoing (interventions implemented as appropriate)    Goal: Skin Integrity  Outcome: Ongoing (interventions implemented as appropriate)      Problem: Patient Care Overview (Adult)  Goal: Plan of Care Review  Outcome: Ongoing (interventions implemented as appropriate)   02/01/18 1925   Coping/Psychosocial Response Interventions   Plan Of Care Reviewed With patient   Patient Care Overview   Progress no change   Outcome Evaluation   Outcome Summary/Follow up Plan Pt has had no c/o pain since coming up from the ER. Only oriented to self. Screened sepsis +, Dr. Roach was notified. Plan is for surgery tomorrow, pt npo. Bed alarm on, will cont to monitor.      Goal: Adult Individualization and Mutuality  Outcome: Ongoing (interventions implemented as appropriate)    Goal: Discharge Needs Assessment  Outcome: Ongoing (interventions implemented as appropriate)      Problem: Fractured Hip (Adult)  Goal: Signs and Symptoms of Listed Potential Problems Will be Absent or Manageable (Fractured Hip)  Outcome: Ongoing (interventions implemented as appropriate)

## 2018-02-02 NOTE — CONSULTS
Pt Name: Andres Walter  MRN: [id number]  YOB: 1929  Date: 02/01/18      HPI: 87 y/o female presented to the ED after an unwitnessed fall at home last evening and a chief complaint of LEFT hip pain. The fall was unwitnessed and patient is demented and a very poor historian, all information comes from a neighbor who is here with her. She was found on the floor after laying for an unknown length of time. EMS was summoned and she was evaluated in the ED and seen to have a left subcapital femur fracture and ortho consulted for treatment.       Past Medical/Surgical History:   No past medical history on file.  No past surgical history on file.        Social History:    Smoking: none   Alcohol: does not drink    Medications:   No current facility-administered medications on file prior to encounter.      No current outpatient prescriptions on file prior to encounter.         Allergies: No Known Allergies      Review of systems:   Not obtained due to patient mental state and no family present.    Physical Exam:   Vitals:    02/01/18 1805   BP: 135/44   Pulse: 105   Resp: 20   Temp: 99.1 °F (37.3 °C)   SpO2: 97%       - General: normal development and appearance     - HEENT: NC/AT, JOLIE, EOMI, Nares appear patent bilaterally.     - Skin: pink, warm, normal tone and turgor    - Neck: supple, no masses    - Chest: no rales or wheezes, normal expansion bilaterally, no retractions seen    - Heart: RRR, no heaves or lifts seen.    - Abdomen: soft, nondistended, nontender    - Vascular: normal pulses, color, tone     - Pysch/Neuro: normal affect, mood, alert and oriented, no suicidal or homicidal ideations    - Musculoskeletal:  Left hip skin is clean, dry, and intact. Slight shortening of left leg noted. NVI distally, +2 pitting edema with borderline cellulitic appearing skin just proximal to the ankle. Reaction to light touch indicating full sensation and strong pulses. Tenderness to palpation noted inguinally and  laterally. Positive log roll.     Radiology:  Left, displaced subcapital femoral neck fracture.    Impression:   Left, displaced subcapital femoral neck fracture.    Surgical Plan:   Left hip lilibeth tomorrow.    Electronically signed by Mathieu Bermudez Jr., PA on 02/01/18 at 7:05 PM

## 2018-02-02 NOTE — ANESTHESIA PREPROCEDURE EVALUATION
Anesthesia Evaluation     Patient summary reviewed   no history of anesthetic complications:  NPO Solid Status: > 8 hours       Airway   Dental      Pulmonary    (-) sleep apnea, not a smoker    ROS comment: Bilateral opacities on CXR  Cardiovascular     ECG reviewed  Patient on routine beta blocker and Beta blocker given within 24 hours of surgery    (+) hypertension, hyperlipidemia      Neuro/Psych  (+) dementia,     (-) seizures, CVA  GI/Hepatic/Renal/Endo    (+)  hypothyroidism,   (-) liver disease, no renal disease, diabetes    Musculoskeletal     Abdominal    Substance History      OB/GYN          Other                                              Anesthesia Plan    ASA 3     general     intravenous induction   Anesthetic plan and risks discussed with healthcare power of  and child.

## 2018-02-02 NOTE — NURSING NOTE
spoke with the patient son whom is enroute from [place] to see patient via of phone. Patient is confused and hard of hearing

## 2018-02-02 NOTE — H&P
[facility]  BARBI Mckay M.D.  AMARIS Emmanuel        Internal Medicine History and Physical      Name: Andres Walter  MRN: [id number]     Acct: [id number]  Room: [facility]/    Admit Date: 2/1/2018  PCP: No Known Provider    Chief Complaint:     Chief Complaint   Patient presents with   • Fall   • Hip Pain       History Obtained From:     chart review and unobtainable from patient due to mental status    History of Present Illness:      Andres Walter is a  88 y.o.  female who presents with   Chief Complaint   Patient presents with   • Fall   • Hip Pain   The patient has been in declining health recently. Our office has had multiple phone calls from neighbors, family members and  stating that patient's self care abilities have declined and that she has become increasingly confused and paranoid. The patient had refused medical treatment and had declined hospital admission. She was found yesterday at home after having suffered an unwitnessed fall at an undetermined time. The circumstances surrounding the fall are unknown. The patient was noted to have decreased range of motion and an obvious deformity of the left hip. She was advised to present to [facility] for evaluation and treatment so that psychiatric evaluation could be completed upon repair of the fracture. The patient, however, presented to [facility]. She was found to have an impacted left subcapital femoral neck fracture as well as a urinary tract infection. She has been admitted to our service for further evaluation and treatment. She's been evaluated by orthopedics with plans for surgical repair of the fracture later today.     Past Medical History:     Past Medical History:   Diagnosis Date   • Arthritis    • Hypertension         Past Surgical History:     History reviewed. No pertinent surgical history.     Medications Prior to Admission:       Prior to Admission  "medications    Medication Sig Start Date End Date Taking? Authorizing Provider   atorvastatin (LIPITOR) 20 MG tablet Take 20 mg by mouth Daily.   Yes Historical Provider, MD   bisoprolol-hydrochlorothiazide (ZIAC) 5-6.25 MG per tablet Take 1 tablet by mouth Daily.   Yes Historical Provider, MD   levothyroxine (SYNTHROID, LEVOTHROID) 100 MCG tablet Take 100 mcg by mouth Daily.   Yes Historical Provider, MD        Allergies:       Review of patient's allergies indicates no known allergies.    Social History:     Tobacco:    reports that she has never smoked. She has never used smokeless tobacco.  Alcohol:      reports that she does not drink alcohol.  Drug Use:  reports that she does not use illicit drugs.    Family History:     History reviewed. No pertinent family history.    Review of Systems:     Review of Systems   Unable to perform ROS: mental status change       Code Status:  Full Code    Physical Exam:     Vitals:  /51 (BP Location: Left arm, Patient Position: Lying)  Pulse 72  Temp 98.5 °F (36.9 °C) (Oral)   Resp 20  Ht 157.5 cm (62\")  Wt 45.4 kg (100 lb)  SpO2 96%  BMI 18.29 kg/m2  Temp (24hrs), Av.5 °F (36.9 °C), Min:97.4 °F (36.3 °C), Max:99.1 °F (37.3 °C)    Physical Exam   Constitutional: She appears lethargic. She appears cachectic.   HENT:   Head: Normocephalic and atraumatic.   Nose: Nose normal.   Mouth/Throat: Oropharynx is clear and moist.   Eyes: Conjunctivae and EOM are normal. Pupils are equal, round, and reactive to light.   Neck: Normal range of motion. Neck supple.   Cardiovascular: Normal rate, regular rhythm, normal heart sounds and intact distal pulses.    Pulmonary/Chest: Effort normal and breath sounds normal.   Abdominal: Soft. Bowel sounds are normal.   Musculoskeletal:   Generalized weakness  Decreased rom left hip   Neurological: She appears lethargic.   Drowsy, but arousable  Oriented to person only   Skin: Skin is warm and dry.   Psychiatric: She has a normal mood " and affect. Her behavior is normal.   Nursing note and vitals reviewed.    Data:     Lab Results (last 7 days)     Procedure Component Value Units Date/Time    CBC & Differential [[id number]] Collected:  02/01/18 1510    Specimen:  Blood Updated:  02/01/18 1519    Narrative:       The following orders were created for panel order CBC & Differential.  Procedure                               Abnormality         Status                     ---------                               -----------         ------                     CBC Auto Differential[[id number]]         Abnormal            Final result                 Please view results for these tests on the individual orders.    CBC Auto Differential [[id number]]  (Abnormal) Collected:  02/01/18 1510    Specimen:  Blood Updated:  02/01/18 1519     WBC 15.52 (H) 10*3/mm3      RBC 3.86 (L) 10*6/mm3      Hemoglobin 11.9 (L) g/dL      Hematocrit 36.8 (L) %      MCV 95.3 fL      MCH 30.8 pg      MCHC 32.3 (L) g/dL      RDW 14.2 %      RDW-SD 48.9 fl      MPV 9.2 fL      Platelets 460 (H) 10*3/mm3      Neutrophil % 87.6 (H) %      Lymphocyte % 6.9 (L) %      Monocyte % 5.1 %      Eosinophil % 0.0 %      Basophil % 0.1 %      Immature Grans % 0.3 %      Neutrophils, Absolute 13.59 (H) 10*3/mm3      Lymphocytes, Absolute 1.07 10*3/mm3      Monocytes, Absolute 0.79 10*3/mm3      Eosinophils, Absolute 0.00 10*3/mm3      Basophils, Absolute 0.02 10*3/mm3      Immature Grans, Absolute 0.05 (H) 10*3/mm3      nRBC 0.0 /100 WBC     Protime-INR [[id number]]  (Normal) Collected:  02/01/18 1510    Specimen:  Blood Updated:  02/01/18 1528     Protime 14.1 Seconds      INR 1.06    aPTT [[id number]]  (Normal) Collected:  02/01/18 1510    Specimen:  Blood Updated:  02/01/18 1528     PTT 32.0 seconds     Comprehensive Metabolic Panel [[id number]]  (Abnormal) Collected:  02/01/18 1510    Specimen:  Blood Updated:  02/01/18 1531     Glucose 117 (H) mg/dL      BUN 34 (H) mg/dL      Creatinine 0.85 mg/dL       Sodium 139 mmol/L      Potassium 4.4 mmol/L      Chloride 97 (L) mmol/L      CO2 27.0 mmol/L      Calcium 8.7 mg/dL      Total Protein 7.6 g/dL      Albumin 3.70 g/dL      ALT (SGPT) 36 U/L      AST (SGOT) 64 (H) U/L      Alkaline Phosphatase 94 U/L      Total Bilirubin 0.9 mg/dL      eGFR Non African Amer 63 mL/min/1.73      Globulin 3.9 gm/dL      A/G Ratio 0.9 (L) g/dL      BUN/Creatinine Ratio 40.0 (H)     Anion Gap 15.0 (H) mmol/L     Narrative:       The MDRD GFR formula is only valid for adults with stable renal function between ages 18 and 70.    CK [[id number]]  (Abnormal) Collected:  02/01/18 1510    Specimen:  Blood Updated:  02/01/18 1531     Creatine Kinase 746 (H) U/L     Urinalysis With / Culture If Indicated - Urine, Catheter [[id number]]  (Abnormal) Collected:  02/01/18 1656    Specimen:  Urine from Urine, Catheter Updated:  02/01/18 1706     Color, UA Yellow     Appearance, UA Clear     pH, UA 5.5     Specific Gravity, UA 1.024     Glucose, UA Negative     Ketones, UA 15 mg/dL (1+) (A)     Bilirubin, UA Negative     Blood, UA Small (1+) (A)     Protein, UA 30 mg/dL (1+) (A)     Leuk Esterase, UA Trace (A)     Nitrite, UA Positive (A)     Urobilinogen, UA 0.2 E.U./dL    Urinalysis, Microscopic Only - Urine, Clean Catch [[id number]]  (Abnormal) Collected:  02/01/18 1656    Specimen:  Urine from Urine, Catheter Updated:  02/01/18 1706     RBC, UA 0-2 (A) /HPF      WBC, UA 13-20 (A) /HPF      Bacteria, UA 4+ (A) /HPF      Squamous Epithelial Cells, UA 3-6 (A) /HPF      Hyaline Casts, UA 3-6 /LPF      Methodology Automated Microscopy    Blood Culture - Blood, [[id number]]  (Normal) Collected:  02/01/18 1708    Specimen:  Blood from Arm, Right Updated:  02/02/18 0531     Blood Culture No growth at less than 24 hours    Blood Culture - Blood, [[id number]]  (Normal) Collected:  02/01/18 1713    Specimen:  Blood from Arm, Left Updated:  02/02/18 0531     Blood Culture No growth at less than 24 hours    Urine Culture  - Urine, Urine, Clean Catch [[id number]]  (Abnormal) Collected:  02/01/18 1656    Specimen:  Urine from Urine, Catheter Updated:  02/02/18 0614     Urine Culture --      >100,000 CFU/mL Escherichia coli (A)    CBC (No Diff) [[id number]]  (Abnormal) Collected:  02/02/18 0605    Specimen:  Blood Updated:  02/02/18 0627     WBC 13.94 (H) 10*3/mm3      RBC 3.57 (L) 10*6/mm3      Hemoglobin 11.1 (L) g/dL      Hematocrit 34.4 (L) %      MCV 96.4 fL      MCH 31.1 pg      MCHC 32.3 (L) g/dL      RDW 14.0 %      RDW-SD 49.8 fl      MPV 9.3 fL      Platelets 443 (H) 10*3/mm3     Basic Metabolic Panel [[id number]]  (Abnormal) Collected:  02/02/18 0605    Specimen:  Blood Updated:  02/02/18 0639     Glucose 80 mg/dL      BUN 32 (H) mg/dL      Creatinine 1.00 mg/dL      Sodium 140 mmol/L      Potassium 3.9 mmol/L      Chloride 99 mmol/L      CO2 28.0 mmol/L      Calcium 8.7 mg/dL      eGFR Non African Amer 52 (L) mL/min/1.73      BUN/Creatinine Ratio 32.0 (H)     Anion Gap 13.0 mmol/L     Narrative:       The MDRD GFR formula is only valid for adults with stable renal function between ages 18 and 70.    Iron Profile [[id number]]  (Abnormal) Collected:  02/02/18 0605    Specimen:  Blood Updated:  02/02/18 0644     Iron 28 (L) mcg/dL      TIBC 261 mcg/dL      Iron Saturation 11 (L) %     Vitamin D 25 Hydroxy [[id number]]  (Abnormal) Collected:  02/02/18 0605    Specimen:  Blood Updated:  02/02/18 0654     25 Hydroxy, Vitamin D 14.5 (L) ng/ml     Folate [[id number]] Collected:  02/02/18 0605    Specimen:  Blood Updated:  02/02/18 0743     Folate >20.00 ng/mL     Vitamin B12 [[id number]]  (Normal) Collected:  02/02/18 0605    Specimen:  Blood Updated:  02/02/18 0743     Vitamin B-12 830 pg/mL         Ct Head Without Contrast    Result Date: 2/1/2018  Narrative: CT HEAD WO CONTRAST- 2/1/2018 4:24 PM EST  HISTORY: fall ; altered mental status  COMPARISON: 12/28/2014  DOSE LENGTH PRODUCT: 1042 mGy cm. Automated exposure control was also  utilized to decrease patient radiation dose.  TECHNIQUE: Axial images of brain obtained without IV contrast.  FINDINGS:  There is cortical atrophy, greatest within the frontal region. No intracranial hemorrhage or mass effect is identified. No acute signs of ischemia. There is no extra-axial hematoma or subarachnoid hemorrhage.  There is near complete opacification of the bilateral sphenoid sinuses. Sphenoid opacification seen on the 2014 exam. There is mild mucosal thickening of the ethmoid sinuses. The mastoid air cells are well-aerated. The bony calvarium appears intact.      Impression: 1. Mild to moderate cortical atrophy, greatest within the frontal region. No acute intracranial abnormality. 2. Persistent chronic sphenoid opacification, chronic sinusitis. This report was finalized on 02/01/2018 15:44 by Dr. Shasta Crawford MD.    Xr Chest 1 View    Result Date: 2/1/2018  Narrative: EXAMINATION: XR CHEST 1 VW- 2/1/2018 16:12 CST  HISTORY: Fall, pain  Comparison: 12/20/2014  Findings: Heart and mediastinal contours appear normal. Increased interstitial markings are seen bilaterally with scattered patchy opacities. There is biapical pleural thickening. No pneumothorax is appreciated. There is no appreciable pleural effusion. The pulmonary vasculature appears normal.      Impression: Impression: Patchy opacities bilaterally are concerning for an infectious or inflammatory process. This report was finalized on 02/01/2018 16:13 by Dr. Cody Stanley MD.    Xr Hip With Or Without Pelvis 2 - 3 View Left    Result Date: 2/1/2018  Narrative: HISTORY: Fall with pain  Left hip: Frontal view the pelvis performed with frontal and cross table lateral views left hip.  There is a left subcapital femoral neck fracture with prominent impaction and superior displacement of the femoral neck in reference to the femoral head. No additional fracture is identified. Coarse pelvic calcification may represent a uterine leiomyoma. There  are vascular calcifications.      Impression: 1. Impacted left subcapital femoral neck fracture. This report was finalized on 02/01/2018 16:13 by Dr. Shasta Crawford MD.        Assesment:       Active Problems:    Fall    Past Medical History:   Diagnosis Date   • Arthritis    • Hypertension        Plan:     1. Impacted left subcapital femur fracture s/p fall  2. UTI - e. Coli - present on admission  3. HTN  4. Encephalopathy  5. Iron deficiency anemia  6. Hypothyroidism  7. HLD    Continue current treatment. Monitor counts. Increase activity. Begin antibiotic treatment for UTI. Check thyroid function. Replace iron. Await patient response to surgery.      Electronically signed by AMARIS Rodriguez on 2/2/2018 at 11:00 AM     Copy sent to Dr. Benito Known Provider

## 2018-02-02 NOTE — PROGRESS NOTES
Received consult that patient lives alone. Plan is surgery today. Patient will need rehab following admission. SS will follow to assess patient after surgery and PT/OT evaluation.

## 2018-02-02 NOTE — ANESTHESIA PROCEDURE NOTES
Airway  Urgency: elective    Airway not difficult    General Information and Staff    Patient location during procedure: OR  CRNA: BOSTON RUIZ    Indications and Patient Condition  Indications for airway management: airway protection    Preoxygenated: yes  Mask difficulty assessment: 1 - vent by mask    Final Airway Details  Final airway type: endotracheal airway      Successful airway: ETT  Cuffed: yes   Successful intubation technique: direct laryngoscopy  Facilitating devices/methods: intubating stylet  Endotracheal tube insertion site: oral  Blade: Stanley  Blade size: #2  ETT size: 7.5 mm  Cormack-Lehane Classification: grade IIa - partial view of glottis  Placement verified by: chest auscultation and capnometry   Cuff volume (mL): 8  Measured from: lips  ETT to lips (cm): 21  Number of attempts at approach: 1    Additional Comments  Atraumatic intubation

## 2018-02-03 ENCOUNTER — APPOINTMENT (OUTPATIENT)
Dept: GENERAL RADIOLOGY | Facility: HOSPITAL | Age: 83
End: 2018-02-03

## 2018-02-03 LAB
ANION GAP SERPL CALCULATED.3IONS-SCNC: 12 MMOL/L (ref 4–13)
BUN BLD-MCNC: 32 MG/DL (ref 5–21)
BUN/CREAT SERPL: 30.8 (ref 7–25)
CALCIUM SPEC-SCNC: 8.5 MG/DL (ref 8.4–10.4)
CHLORIDE SERPL-SCNC: 102 MMOL/L (ref 98–110)
CO2 SERPL-SCNC: 24 MMOL/L (ref 24–31)
CREAT BLD-MCNC: 1.04 MG/DL (ref 0.5–1.4)
DEPRECATED RDW RBC AUTO: 49.5 FL (ref 40–54)
ERYTHROCYTE [DISTWIDTH] IN BLOOD BY AUTOMATED COUNT: 14.2 % (ref 12–15)
GFR SERPL CREATININE-BSD FRML MDRD: 50 ML/MIN/1.73
GLUCOSE BLD-MCNC: 79 MG/DL (ref 70–100)
HCT VFR BLD AUTO: 28.5 % (ref 37–47)
HGB BLD-MCNC: 9.3 G/DL (ref 12–16)
MCH RBC QN AUTO: 31.4 PG (ref 28–32)
MCHC RBC AUTO-ENTMCNC: 32.6 G/DL (ref 33–36)
MCV RBC AUTO: 96.3 FL (ref 82–98)
PLATELET # BLD AUTO: 381 10*3/MM3 (ref 130–400)
PMV BLD AUTO: 9.4 FL (ref 6–12)
POTASSIUM BLD-SCNC: 4.1 MMOL/L (ref 3.5–5.3)
RBC # BLD AUTO: 2.96 10*6/MM3 (ref 4.2–5.4)
SODIUM BLD-SCNC: 138 MMOL/L (ref 135–145)
TSH SERPL DL<=0.05 MIU/L-ACNC: 52.2 MIU/ML (ref 0.47–4.68)
WBC NRBC COR # BLD: 9.89 10*3/MM3 (ref 4.8–10.8)

## 2018-02-03 PROCEDURE — 73600 X-RAY EXAM OF ANKLE: CPT

## 2018-02-03 PROCEDURE — 84443 ASSAY THYROID STIM HORMONE: CPT | Performed by: NURSE PRACTITIONER

## 2018-02-03 PROCEDURE — 85027 COMPLETE CBC AUTOMATED: CPT | Performed by: INTERNAL MEDICINE

## 2018-02-03 PROCEDURE — 97110 THERAPEUTIC EXERCISES: CPT | Performed by: PHYSICAL THERAPIST

## 2018-02-03 PROCEDURE — 73620 X-RAY EXAM OF FOOT: CPT

## 2018-02-03 PROCEDURE — 25010000002 CEFTRIAXONE PER 250 MG: Performed by: INTERNAL MEDICINE

## 2018-02-03 PROCEDURE — 25010000003 CEFAZOLIN PER 500 MG: Performed by: ORTHOPAEDIC SURGERY

## 2018-02-03 PROCEDURE — 92610 EVALUATE SWALLOWING FUNCTION: CPT

## 2018-02-03 PROCEDURE — G8978 MOBILITY CURRENT STATUS: HCPCS | Performed by: PHYSICAL THERAPIST

## 2018-02-03 PROCEDURE — 97166 OT EVAL MOD COMPLEX 45 MIN: CPT

## 2018-02-03 PROCEDURE — G8997 SWALLOW GOAL STATUS: HCPCS

## 2018-02-03 PROCEDURE — G8996 SWALLOW CURRENT STATUS: HCPCS

## 2018-02-03 PROCEDURE — G8979 MOBILITY GOAL STATUS: HCPCS | Performed by: PHYSICAL THERAPIST

## 2018-02-03 PROCEDURE — G8987 SELF CARE CURRENT STATUS: HCPCS

## 2018-02-03 PROCEDURE — 80048 BASIC METABOLIC PNL TOTAL CA: CPT | Performed by: INTERNAL MEDICINE

## 2018-02-03 PROCEDURE — G8988 SELF CARE GOAL STATUS: HCPCS

## 2018-02-03 PROCEDURE — 97530 THERAPEUTIC ACTIVITIES: CPT | Performed by: PHYSICAL THERAPIST

## 2018-02-03 PROCEDURE — 25010000002 ENOXAPARIN PER 10 MG: Performed by: ORTHOPAEDIC SURGERY

## 2018-02-03 PROCEDURE — 97161 PT EVAL LOW COMPLEX 20 MIN: CPT | Performed by: PHYSICAL THERAPIST

## 2018-02-03 RX ORDER — TOBRAMYCIN 3 MG/ML
1 SOLUTION/ DROPS OPHTHALMIC 3 TIMES DAILY
COMMUNITY
End: 2018-05-15

## 2018-02-03 RX ORDER — TOBRAMYCIN 3 MG/ML
1 SOLUTION/ DROPS OPHTHALMIC 3 TIMES DAILY
Status: DISCONTINUED | OUTPATIENT
Start: 2018-02-03 | End: 2018-02-07 | Stop reason: HOSPADM

## 2018-02-03 RX ORDER — DORZOLAMIDE HCL 20 MG/ML
1 SOLUTION/ DROPS OPHTHALMIC 3 TIMES DAILY
Status: DISCONTINUED | OUTPATIENT
Start: 2018-02-03 | End: 2018-02-07 | Stop reason: HOSPADM

## 2018-02-03 RX ORDER — DORZOLAMIDE HCL 20 MG/ML
1 SOLUTION/ DROPS OPHTHALMIC 3 TIMES DAILY
Status: ON HOLD | COMMUNITY
End: 2020-08-11

## 2018-02-03 RX ORDER — BRIMONIDINE TARTRATE AND TIMOLOL MALEATE 2; 5 MG/ML; MG/ML
1 SOLUTION OPHTHALMIC EVERY 12 HOURS
Status: ON HOLD | COMMUNITY
End: 2020-08-11

## 2018-02-03 RX ADMIN — TOBRAMYCIN 1 DROP: 3 SOLUTION OPHTHALMIC at 18:41

## 2018-02-03 RX ADMIN — CEFAZOLIN 1 G: 1 INJECTION, POWDER, FOR SOLUTION INTRAVENOUS at 04:38

## 2018-02-03 RX ADMIN — DORZOLAMIDE HYDROCHLORIDE 1 DROP: 20 SOLUTION/ DROPS OPHTHALMIC at 20:18

## 2018-02-03 RX ADMIN — TOBRAMYCIN 1 DROP: 3 SOLUTION OPHTHALMIC at 20:18

## 2018-02-03 RX ADMIN — CEFAZOLIN 1 G: 1 INJECTION, POWDER, FOR SOLUTION INTRAVENOUS at 14:28

## 2018-02-03 RX ADMIN — HYDROCODONE BITARTRATE AND ACETAMINOPHEN 1 TABLET: 5; 325 TABLET ORAL at 08:33

## 2018-02-03 RX ADMIN — DORZOLAMIDE HYDROCHLORIDE 1 DROP: 20 SOLUTION/ DROPS OPHTHALMIC at 15:51

## 2018-02-03 RX ADMIN — BISOPROLOL FUMARATE: 5 TABLET ORAL at 08:34

## 2018-02-03 RX ADMIN — FERROUS SULFATE TAB 325 MG (65 MG ELEMENTAL FE) 325 MG: 325 (65 FE) TAB at 18:04

## 2018-02-03 RX ADMIN — SODIUM CHLORIDE 100 ML/HR: 9 INJECTION, SOLUTION INTRAVENOUS at 05:12

## 2018-02-03 RX ADMIN — SODIUM CHLORIDE 100 ML/HR: 9 INJECTION, SOLUTION INTRAVENOUS at 16:47

## 2018-02-03 RX ADMIN — CEFTRIAXONE SODIUM 1 G: 1 INJECTION, POWDER, FOR SOLUTION INTRAMUSCULAR; INTRAVENOUS at 18:04

## 2018-02-03 RX ADMIN — ENOXAPARIN SODIUM 30 MG: 30 INJECTION SUBCUTANEOUS at 08:35

## 2018-02-03 RX ADMIN — FERROUS SULFATE TAB 325 MG (65 MG ELEMENTAL FE) 325 MG: 325 (65 FE) TAB at 08:34

## 2018-02-03 NOTE — PLAN OF CARE
Problem: Pressure Ulcer Risk (To Scale) (Adult,Obstetrics,Pediatric)  Goal: Skin Integrity  Outcome: Ongoing (interventions implemented as appropriate)      Problem: Patient Care Overview (Adult)  Goal: Plan of Care Review  Outcome: Ongoing (interventions implemented as appropriate)   02/02/18 1940   Coping/Psychosocial Response Interventions   Plan Of Care Reviewed With son   Patient Care Overview   Progress improving   Outcome Evaluation   Outcome Summary/Follow up Plan Pt is still drowsny after surgery. Right eye was bruised when pt was returned to the floor from surgery. Notified PACU RN.      Goal: Adult Individualization and Mutuality  Outcome: Ongoing (interventions implemented as appropriate)    Goal: Discharge Needs Assessment  Outcome: Ongoing (interventions implemented as appropriate)      Problem: Fractured Hip (Adult)  Goal: Signs and Symptoms of Listed Potential Problems Will be Absent or Manageable (Fractured Hip)  Outcome: Ongoing (interventions implemented as appropriate)      Problem: Perioperative Period (Adult)  Goal: Signs and Symptoms of Listed Potential Problems Will be Absent or Manageable (Perioperative Period)  Outcome: Ongoing (interventions implemented as appropriate)

## 2018-02-03 NOTE — PROGRESS NOTES
Continued Stay Note  [facility]     Patient Name: Andres Walter  MRN: [id number]  Today's Date: 2/3/2018    Admit Date: 2/1/2018          Discharge Plan       02/03/18 0931    Case Management/Social Work Plan    Plan LEANNE spoke to son, Tirso [phone] about dc plan.  [facility] what facility at this time.  He would not let SW fax referrals yet.  He stated he will get back with LEANNE soon.  RAMSES Deras.    Patient/Family In Agreement With Plan yes              Discharge Codes     None            RAMSES Da Silva

## 2018-02-03 NOTE — THERAPY EVALUATION
Acute Care - Speech Language Pathology   Swallow Initial Evaluation [facility]     Patient Name: Andres Walter  : 1929  MRN: [id number]  Today's Date: 2/3/2018  Onset of Illness/Injury or Date of Surgery Date: 18            Admit Date: 2018  CBSE completed. Full range of consistencies presented. Pt had an immediate cough following thin liquid via straw 1x. No overt s/s of aspiration were observed with thin liquids via cup. She had adequate mastication of the regular solid. No oral residue was observed. Nursing reports pt coughing with thin liquids this morning. Recommend continuing regular solid diet and thin liquids, but avoiding straw use with liquids, and encouraging pt to drink from the cup instead. Meds whole with applesauce.   Vamsi Jacinto, CCC-SLP 2/3/2018 2:22 PM    Visit Dx:     ICD-10-CM ICD-9-CM   1. Fall, initial encounter W19.XXXA E888.9   2. Closed fracture of left hip, initial encounter S72.002A 820.8   3. Pneumonia of both lungs due to infectious organism, unspecified part of lung J18.9 483.8   4. Acute UTI N39.0 599.0   5. Abnormality of gait and mobility R26.9 781.2   6. Dysphagia, unspecified type R13.10 787.20   7. Impaired mobility and ADLs Z74.09 799.89     Patient Active Problem List   Diagnosis   • Fall     Past Medical History:   Diagnosis Date   • Arthritis    • Encephalopathy    • Hypertension      Past Surgical History:   Procedure Laterality Date   • TOTAL HIP ARTHROPLASTY Left 2018          SWALLOW EVALUATION (last 72 hours)      Swallow Evaluation       18 1130                Rehab Evaluation    Document Type evaluation  -MB        Subjective Information agree to therapy;complains of;pain  -MB        General Information    Patient Profile Review yes  -MB        Onset of Illness/Injury 18  -MB        Pertinent History Of Current Problem Fall with left femur fracture s/p surgery. Hx of arthritis, HTN.  -MB        Current Diet Limitations regular  solid;thin liquids  -MB        Precautions/Limitations, Vision WFL  -MB        Precautions/Limitations, Hearing hearing impairment, bilaterally  -MB        Prior Level of Function- Communication functional for ADL's without assistance  -MB        Prior Level of Function- Swallowing no diet consistency restrictions  -MB        Plans/Goals Discussed With patient and family  -MB        Barriers to Rehab cognitive status  -MB        Clinical Impression    Patient's Goals For Discharge patient did not state  -MB        Family Goals For Discharge family did not state  -MB        SLP Swallowing Diagnosis other (see comments)   r/o pharyngeal dysphagia  -MB        Rehab Potential/Prognosis, Swallowing good, to achieve stated therapy goals  -MB        Criteria for Skilled Therapeutic Interventions Met demonstrates skilled criteria for intervention  -MB        Therapy Frequency PRN  -MB        Predicted Duration Therapy Interv (days) 1-2 days  -MB        Expected Duration Therapy Session (min) 15-30 minutes  -MB        SLP Diet Recommendation regular textures;thin liquids  -MB        Recommended Feeding/Eating Techniques maintain upright posture during/after eating for 30 mins  -MB        SLP Rec. for Method of Medication Administration meds whole in pudding/applesauce  -MB        Monitor For Signs Of Aspiration cough;gurgly voice;throat clearing;pneumonia  -MB        Anticipated Discharge Disposition skilled nursing facility  -MB        Pain Assessment    Pain Location Hip  -MB        Cognitive Assessment/Intervention    Current Cognitive/Communication Assessment impaired  -MB        Follows Commands/Answers Questions 75% of the time  -MB        Oral Motor Structure and Function    Dentition Assessment upper dentures/partial in place  -MB        Secretion Management WNL/WFL  -MB        Mucosal Quality dry  -MB        Velar Elevation WFL (within functional limits)  -MB        Volitional Swallow no difficulties initiating  volitional swallow  -MB        Volitional Cough weak volitional cough  -MB        Oral Musculature General Assessment WFL (within functional limits)  -MB        General Feeding/Swallowing Observations    Current Feeding Method oral feeding methods  -MB        Clinical Swallow Exam    Mode of Presentation fed by clinician;spoon;straw  -MB        Oral Phase Results intact oral phase without signs of dysfunction  -MB        Pharyngeal Phase Results sign/symptoms of pharyngeal impairment;cough  -MB        Summary of Clinical Exam Full range of consistencies presented. Pt had an immediate cough following thin liquid via straw 1x. No overt s/s of aspiration were observed with thin liquids via cup. She had adequate mastication of the regular solid. No oral residue was observed. Nursing reports pt coughing with thin liquids this morning.   -MB        Swallow Recommendations    Eating Assistance needs occasional supervision during self eating activity  -MB        Oral Care oral care with toothbrush and dentifrice BID and PRN  -MB        Modified Eating Strategies upright positioning 90 degrees;NO straw use for liquid intake  -MB        Recommended Diet regular textures;thin liquids  -MB          User Key  (r) = Recorded By, (t) = Taken By, (c) = Cosigned By    Initials Name Effective Dates    MB Vamsi Jacinto CCC-SLP 08/02/16 -         EDUCATION  The patient has been educated in the following areas:   Dysphagia (Swallowing Impairment).    SLP Recommendation and Plan  SLP Swallowing Diagnosis: other (see comments) (r/o pharyngeal dysphagia)  SLP Diet Recommendation: regular textures, thin liquids  Recommended Feeding/Eating Techniques: maintain upright posture during/after eating for 30 mins  SLP Rec. for Method of Medication Administration: meds whole in pudding/applesauce  Monitor For Signs Of Aspiration: cough, gurgly voice, throat clearing, pneumonia     Criteria for Skilled Therapeutic Interventions Met:  demonstrates skilled criteria for intervention  Anticipated Discharge Disposition: skilled nursing facility  Rehab Potential/Prognosis, Swallowing: good, to achieve stated therapy goals  Therapy Frequency: PRN             Plan of Care Review  Plan Of Care Reviewed With: patient, caregiver, son  Outcome Summary/Follow up Plan: CBSE completed. Full range of consistencies presented. Pt had an immediate cough following thin liquid via straw 1x. No overt s/s of aspiration were observed with thin liquids via cup. She had adequate mastication of the regular solid. No oral residue was observed. Nursing reports pt coughing with thin liquids this morning. Recommend continuing regular solid diet and thin liquids, but avoiding straw use with liquids, and encouraging pt to drink from the cup instead. Meds whole with applesauce.           IP SLP Goals       02/03/18 1418          Safely Consume Diet    Safely Consume Diet- SLP, Date Established 02/03/18  -MB      Safely Consume Diet- SLP, Time to Achieve 2 days  -MB      Safely Consume Diet- SLP, Additional Goal Pt will tolerate trials of current diet with no overt s/s of aspiration.  -MB      Safely Consume Diet- SLP, Outcome goal ongoing  -MB        User Key  (r) = Recorded By, (t) = Taken By, (c) = Cosigned By    Initials Name Provider Type    JANETH Jacinto CCC-SLP Speech and Language Pathologist             SLP Outcome Measures (last 72 hours)      SLP Outcome Measures       02/03/18 1400          SLP Outcome Measures    Outcome Measure Used? Adult NOMS  -MB      FCM Scores    FCM Chosen Swallowing  -MB      Swallowing FCM Score 6  -MB        User Key  (r) = Recorded By, (t) = Taken By, (c) = Cosigned By    Initials Name Effective Dates    JANETH Jacinto CCC-SLP 08/02/16 -            Time Calculation:         Time Calculation- SLP       02/03/18 1421          Time Calculation- SLP    SLP Start Time 1130  -MB      SLP Stop Time 1223  -MB      SLP Time  Calculation (min) 53 min  -MB      SLP Received On 02/03/18  -MB      SLP Goal Re-Cert Due Date 02/13/18  -MB        User Key  (r) = Recorded By, (t) = Taken By, (c) = Cosigned By    Initials Name Provider Type    KATARZYNA Roca Speech and Language Pathologist          Therapy Charges for Today     Code Description Service Date Service Provider Modifiers Qty    16311174817 HC ST SWALLOWING CURRENT STATUS 2/3/2018 KATARZYNA Strickland GN, CI 1    56508273817 HC ST SWALLOWING PROJECTED 2/3/2018 KATARZYNA Strickland GN, CI 1    37383123224 HC ST EVAL ORAL PHARYNG SWALLOW 4 2/3/2018 KATARZYNA Strickland GN, KX 1          SLP G-Codes  SLP NOMS Used?: Yes  Functional Limitations: Swallowing  Swallow Current Status (): At least 1 percent but less than 20 percent impaired, limited or restricted  Swallow Goal Status (): At least 1 percent but less than 20 percent impaired, limited or restricted    KATARZYNA Yanes  2/3/2018

## 2018-02-03 NOTE — THERAPY EVALUATION
Acute Care - Physical Therapy Initial Evaluation  [facility]     Patient Name: Andres Walter  : 1929  MRN: [id number]  Today's Date: 2/3/2018   Onset of Illness/Injury or Date of Surgery Date: 18  Date of Referral to PT: 18  Referring Physician: Dr. Barger      Admit Date: 2018     Visit Dx:    ICD-10-CM ICD-9-CM   1. Fall, initial encounter W19.XXXA E888.9   2. Closed fracture of left hip, initial encounter S72.002A 820.8   3. Pneumonia of both lungs due to infectious organism, unspecified part of lung J18.9 483.8   4. Acute UTI N39.0 599.0   5. Abnormality of gait and mobility R26.9 781.2     Patient Active Problem List   Diagnosis   • Fall     Past Medical History:   Diagnosis Date   • Arthritis    • Encephalopathy    • Hypertension      Past Surgical History:   Procedure Laterality Date   • TOTAL HIP ARTHROPLASTY Left 2018          PT ASSESSMENT (last 72 hours)      PT Evaluation       18 1200 18 0900    Rehab Evaluation    Document Type evaluation  -TB (P)  evaluation  -MK    Subjective Information agree to therapy;complains of;pain  -TB     Patient Effort, Rehab Treatment adequate  -TB     General Information    Patient Profile Review yes  -TB (P)  yes  -MK    Onset of Illness/Injury or Date of Surgery Date 18  -TB (P)  18  -MK    Referring Physician Dr. Barger  -TB (P)  Dr. Barger  -    General Observations Fowlers position; awake and alert with IV, FC; benja dorsum of feet swollen and red, right worse than left and fully PF'd; hip abd pillow between knees and pillows under lower legs and feet; no pressure areas noted on posterior heels  -TB (P)  in fowlers, edema present in BLE  -MK    Pertinent History Of Current Problem Patient has h/o declining mental status. She was found in the floor and found to have a fractured left hip. Her son says prior to this, she was not walking much.  -TB (P)  declining mental status, XR 2/1 L subcapital femoral neck fx; s/p L  hip hemiarthroplasty  -MK    Precautions/Limitations fall precautions;hip precautions- left  -TB     Prior Level of Function all household mobility;gait;independent:  -TB     Equipment Currently Used at Home none  -TB     Plans/Goals Discussed With patient;family;agreed upon   son  -TB     Risks Reviewed patient:;family:;LOB;increased discomfort  -TB     Benefits Reviewed patient:;family:;improve function;increase independence;increase strength;decrease pain  -TB     Barriers to Rehab cognitive status;physical barrier;previous functional deficit  -TB     Living Environment    Lives With alone  -TB     Living Arrangements house  -TB     Home Accessibility stairs within home;stairs to enter home  -TB     Stair Railings at Home inside, present on right side  -TB     Type of Financial/Environmental Concern none  -TB     Transportation Available family or friend will provide  -TB     Living Environment Comment she says she walked alot in her house but her son says she hadn't been walking much lately  -TB     Clinical Impression    Date of Referral to PT 02/02/18  -TB     PT Diagnosis abnormal gait/mobility  -TB     Prognosis per MD  -TB     Functional Level At Time Of Evaluation max asst  -TB     Patient/Family Goals Statement improve function  -TB     Criteria for Skilled Therapeutic Interventions Met yes  -TB     Pathology/Pathophysiology Noted (Describe Specifically for Each System) musculoskeletal  -TB     Impairments Found (describe specific impairments) gait, locomotion, and balance  -TB     Functional Limitations in Following Categories (Describe Specific Limitations) self-care  -TB     Rehab Potential good, to achieve stated therapy goals  -TB     Predicted Duration of Therapy Intervention (days/wks) LOS  -TB     Pain Assessment    Pain Assessment Tello-Cabral FACES  -TB     Tello-Cabral FACES Pain Rating 8  -TB     Pain Location Hip  -TB     Pain Orientation Right;Left  -TB     Pain Frequency Constant/continuous   -TB     Pain Onset Sudden  -TB     Effect of Pain on Daily Activities says right hip hurts worse than left; also c/o benja ankle/foot pain  -TB     Patient's Stated Pain Goal No pain  -TB     Pain Intervention(s) Repositioned  -TB     Response to Interventions improved  -TB     Vision Assessment/Intervention    Visual Impairment WFL  -TB     Cognitive Assessment/Intervention    Current Cognitive/Communication Assessment impaired  -TB     Orientation Status oriented to;person;unable/difficult to assess  -TB     Follows Commands/Answers Questions able to follow single-step instructions  -TB     Personal Safety severe impairment  -TB     Personal Safety Interventions gait belt;fall prevention program maintained;elopement precautions initiated;nonskid shoes/slippers when out of bed  -TB     Short/Long Term Memory severe impairment, short term memory   had to remind of surgery and POC every couple of minutes  -TB     ROM (Range of Motion)    General ROM lower extremity range of motion deficits identified  -TB     General ROM Detail benja UE WFL  -TB     General LE Assessment    ROM Detail able to heel slide benja hips to 60 deg; right ankle passive DF to -10; right ankle DF to 0 after sustained stretching  -TB     MMT (Manual Muscle Testing)    General MMT Assessment Detail grossly 3+/5 4-/5 benja LE; mostly limited by fear  -TB     Mobility Assessment/Training    Extremity Weight-Bearing Status left lower extremity  -TB     Left Lower Extremity Weight-Bearing full weight-bearing  -TB     Bed Mobility, Assessment/Treatment    Bed Mobility, Scoot/Bridge, [place] moderate assist (50% patient effort);verbal cues required   able to push with feet; able to scoot laterally with cues  -TB     Bed Mob, Supine to Sit, [place] verbal cues required;maximum assist (25% patient effort)  -TB     Bed Mob, Sit to Supine, [place] verbal cues required;maximum assist (25% patient effort)  -TB     Transfer Assessment/Treatment     Transfers, Sit-Stand [place] maximum assist (25% patient effort);2 person assist required  -TB     Transfers, Stand-Sit [place] maximum assist (25% patient effort);2 person assist required  -TB     Transfers, Sit-Stand-Sit, Assist Device rolling walker  -TB     Transfer, Comment pushed posterior throughout; avoided standing fully erect with weight on benja feet; able to stand about 75% erect with max asst and gait belt; stood about 2 min  -TB     Gait Assessment/Treatment    Gait, [place] Level unable to perform  -TB     Therapy Exercises    Right Lower Extremity  supine;heel slides;AAROM:   passive sustained ankle DF stretch x 10 min  -TB    Left Lower Extremity  supine;heel slides;AAROM:   sustained ankle DF stretch x 5 minutes  -TB    Sensory Assessment/Intervention    Sensory Impairment  --   intact  -TB    General Interventions    Balance Training  focus on balance before walking  -TB    Stretching  stretch benja ankle DF  -TB    Positioning and Restraints    Pre-Treatment Position  in bed  -TB    Post Treatment Position  bed  -TB    In Bed  supine;call light within reach;with family/caregiver;ABD pillow;heels elevated   pillow braced against feet to prevent PF  -TB      02/03/18 0840 02/01/18 2143    Rehab Evaluation    Document Type evaluation  -TB     Evaluation Not Performed --   eating  -TB     General Information    Patient Profile Review yes  -TB     Onset of Illness/Injury or Date of Surgery Date 02/01/18  -TB     Referring Physician Charbel RODRIGUEZ     Pertinent History Of Current Problem Patient has h/o declining mental status. She was found in the floor and found to have a fractured left hip.  -TB     Equipment Currently Used at Home none  -TB     Barriers to Rehab cognitive status;physical barrier  -TB     Living Environment    Lives With alone  -TB alone  -AD    Living Arrangements house  -TB house  -AD    Home Accessibility  stairs within home;stairs to enter home  -AD    Stair Railings at  Home  inside, present on right side  -AD    Type of Financial/Environmental Concern  none  -AD    Transportation Available family or friend will provide  -TB family or friend will provide  -AD      02/01/18 2148       General Information    Equipment Currently Used at Home none  -AD       User Key  (r) = Recorded By, (t) = Taken By, (c) = Cosigned By    Initials Name Provider Type    TB Josias White, PT Physical Therapist    AD Anni Rosa, RN Registered Nurse    DIONNE Cavazos, OT Student OT Student          Physical Therapy Education     Title: PT OT SLP Therapies (Active)     Topic: Physical Therapy (Active)     Point: Mobility training (Active)    Learning Progress Summary    Learner Readiness Method Response Comment Documented by Status   Patient Acceptance E NR   02/03/18 1227 Active   Family Acceptance E NR   02/03/18 1227 Active               Point: Home exercise program (Active)    Learning Progress Summary    Learner Readiness Method Response Comment Documented by Status   Patient Acceptance E NR   02/03/18 1227 Active   Family Acceptance E NR   02/03/18 1227 Active               Point: Body mechanics (Active)    Learning Progress Summary    Learner Readiness Method Response Comment Documented by Status   Patient Acceptance E NR   02/03/18 1227 Active   Family Acceptance E NR   02/03/18 1227 Active               Point: Precautions (Active)    Learning Progress Summary    Learner Readiness Method Response Comment Documented by Status   Patient Acceptance E NR   02/03/18 1227 Active   Family Acceptance E NR   02/03/18 1227 Active                      User Key     Initials Effective Dates Name Provider Type Discipline     08/02/16 -  Josias White, PT Physical Therapist PT                PT Recommendation and Plan  Anticipated Equipment Needs At Discharge: gait belt, two wheeled walker  Anticipated Discharge Disposition: transitional care  Planned Therapy Interventions: strengthening,  ROM (Range of Motion), bed mobility training, gait training, transfer training, balance training  PT Frequency: 2 times/day  Plan of Care Review  Plan Of Care Reviewed With: patient, son  Outcome Summary/Follow up Plan: PT noy done. Patient very resistant to standing but was eventually talked into it. Her benja feet are painful and tight into PF and says her right hip hurts worse than her left hip, but she was able to active slide both of her heels up in bed. Stood with max asst x 2 with rwx but only about 75% upright at best; she tends to push herself posterior likely mostly out of fear.           IP PT Goals       02/03/18 1229          Bed Mobility PT LTG    Bed Mobility PT LTG, Date Established 02/03/18  -TB      Bed Mobility PT LTG, Time to Achieve by discharge  -TB      Bed Mobility PT LTG, Activity Type all bed mobility  -TB      Bed Mobility PT LTG, [place] Level supervision required  -TB      Transfer Training PT LTG    Transfer Training PT LTG, Date Established 02/03/18  -TB      Transfer Training PT LTG, Time to Achieve by discharge  -TB      Transfer Training PT LTG, Activity Type all transfers  -TB      Transfer Training PT LTG, [place] Level contact guard assist  -TB      Transfer Training PT LTG, Assist Device walker, rolling  -TB      Gait Training PT LTG    Gait Training Goal PT LTG, Date Established 02/03/18  -TB      Gait Training Goal PT LTG, Time to Achieve by discharge  -TB      Gait Training Goal PT LTG, [place] Level contact guard assist  -TB      Gait Training Goal PT LTG, Assist Device walker, rolling  -TB      Gait Training Goal PT LTG, Distance to Achieve 20  -TB      Gait Training Goal PT LTG, Additional Goal able to stand upright without losing balance posterior with min limping  -TB      Range of Motion PT LTG    Range of Motion Goal PT LTG, Date Established 02/03/18  -TB      Range of Motion Goal PT LTG, Time to Achieve by discharge  -TB      Range of Motion Goal PT LTG,  PROM Measure benja ankle DF >0 deg  -TB        User Key  (r) = Recorded By, (t) = Taken By, (c) = Cosigned By    Initials Name Provider Type    TB Josias White PT Physical Therapist                Outcome Measures       02/03/18 1200          How much help from another person do you currently need...    Turning from your back to your side while in flat bed without using bedrails? 2  -TB      Moving from lying on back to sitting on the side of a flat bed without bedrails? 2  -TB      Moving to and from a bed to a chair (including a wheelchair)? 1  -TB      Standing up from a chair using your arms (e.g., wheelchair, bedside chair)? 2  -TB      Climbing 3-5 steps with a railing? 1  -TB      To walk in hospital room? 1  -TB      AM-PAC 6 Clicks Score 9  -TB      Functional Assessment    Outcome Measure Options AM-PAC 6 Clicks Basic Mobility (PT)  -TB        User Key  (r) = Recorded By, (t) = Taken By, (c) = Cosigned By    Initials Name Provider Type    TB Josias White PT Physical Therapist           Time Calculation:         PT Charges       02/03/18 1234          Time Calculation    Start Time 1100  -TB      Stop Time 1235  -TB      Time Calculation (min) 95 min  -TB      PT Received On 02/03/18  -TB      PT Goal Re-Cert Due Date 02/13/18  -TB      Time Calculation- PT    Total Timed Code Minutes- PT 30 minute(s)  -TB        User Key  (r) = Recorded By, (t) = Taken By, (c) = Cosigned By    Initials Name Provider Type    ALEXUS White PT Physical Therapist          Therapy Charges for Today     Code Description Service Date Service Provider Modifiers Qty    27964826809 HC PT MOBILITY CURRENT 2/3/2018 Josias White PT GP, CL 1    98145526324 HC PT MOBILITY PROJECTED 2/3/2018 Josias White PT GP, CK 1    42857353039 HC PT EVAL LOW COMPLEXITY 4 2/3/2018 Josias White PT GP 1    71042431928 HC PT THER PROC EA 15 MIN 2/3/2018 Josias White PT GP 1    96158143938 HC PT THERAPEUTIC ACT EA 15 MIN  2/3/2018 Josias White, PT GP 1          PT G-Codes  Outcome Measure Options: AM-PAC 6 Clicks Basic Mobility (PT)  Score: 9  Functional Limitation: Mobility: Walking and moving around  Mobility: Walking and Moving Around Current Status (): At least 60 percent but less than 80 percent impaired, limited or restricted  Mobility: Walking and Moving Around Goal Status (): At least 40 percent but less than 60 percent impaired, limited or restricted      Josias White, PT  2/3/2018

## 2018-02-03 NOTE — PROGRESS NOTES
MD Mathieu Moya PA-C, MSPAS       Orthopaedic Surgery Progress Note      2/3/2018   7:15 AM    Name:  Andres Walter  MRN:    [id number]     Acct:     [id number]   Room:  [address] [place] [address] Day: 2  POD:    1 Day Post-Op  Procedure: HIP HEMIARTHROPLASTY (Left)     Admit Date: 2/1/2018  PCP: No Known Provider        Subjective:     Interval: Rested well last PM per nursing. Continued confusion, appears no worse than preoperative level. No complaints of pain this morning. No family present.      Medications:     Allergies: No Known Allergies    Current Meds:   Current Facility-Administered Medications   Medication Dose Route Frequency Provider Last Rate Last Dose   • bisoprolol (ZEBeta) 5 mg, hydrochlorothiazide (HYDRODIURIL) 6.25 mg for Ziac 5-6.25   Oral Q24H Som Roach MD       • ceFAZolin (ANCEF) 1 g/10ml IV PUSH syringe  1 g Intravenous Q8H Emory Barger MD   1 g at 02/03/18 0438   • cefTRIAXone (ROCEPHIN) 1 g/10mL IV PUSH syringe  1 g Intravenous Q24H Som Roach MD       • enoxaparin (LOVENOX) syringe 30 mg  30 mg Subcutaneous Q24H Emory Barger MD       • ferrous sulfate tablet 325 mg  325 mg Oral BID With Meals AMARIS Rodriguez       • HYDROcodone-acetaminophen (NORCO)  MG per tablet 1 tablet  1 tablet Oral Q4H PRN Emory Barger MD       • HYDROcodone-acetaminophen (NORCO) 5-325 MG per tablet 1 tablet  1 tablet Oral Q4H PRN Emory Barger MD       • Influenza Vac Subunit Quad (FLUCELVAX) injection 0.5 mL  0.5 mL Intramuscular During Hospitalization Som Roach MD       • lactated ringers infusion  9 mL/hr Intravenous Continuous Krishna Moon MD   Stopped at 02/02/18 1803   • levothyroxine (SYNTHROID, LEVOTHROID) tablet 100 mcg  100 mcg Oral Q AM Som Roach MD   100 mcg at 02/01/18 2038   • Morphine sulfate (PF) injection 1 mg  1 mg Intravenous Q4H PRN Emory Barger MD        And   • naloxone (NARCAN) injection 0.4 mg  0.4 mg Intravenous Q5  "Min PRN Emory Barger MD       • Morphine sulfate (PF) injection 4 mg  4 mg Intravenous Q4H PRN Som Roach MD        And   • naloxone (NARCAN) injection 0.4 mg  0.4 mg Intravenous Q5 Min PRN Som Roach MD       • ondansetron (ZOFRAN) injection 4 mg  4 mg Intravenous Q6H PRN Som Roach MD       • ondansetron (ZOFRAN) injection 4 mg  4 mg Intravenous Q6H PRN Emory Barger MD       • ondansetron (ZOFRAN) tablet 4 mg  4 mg Oral Q6H PRN Emory Barger MD       • pneumococcal polysaccharide 23-valent (PNEUMOVAX-23) vaccine 0.5 mL  0.5 mL Intramuscular During Hospitalization Som Roach MD       • sodium chloride 0.9 % flush 1-10 mL  1-10 mL Intravenous PRN Som Roach MD       • sodium chloride 0.9 % flush 10 mL  10 mL Intravenous PRN Gabriela Barrett MD       • sodium chloride 0.9 % infusion  100 mL/hr Intravenous Continuous Emory Barger  mL/hr at 18 05 100 mL/hr at 18 0512         Data:     Vitals:  BP (!) 105/32 (BP Location: Right arm, Patient Position: Lying)  Pulse 77  Temp 97.3 °F (36.3 °C) (Axillary)   Resp 16  Ht 157.5 cm (62\")  Wt 45.4 kg (100 lb)  SpO2 96%  BMI 18.29 kg/m2  Temp (24hrs), Av.6 °F (36.4 °C), Min:97.1 °F (36.2 °C), Max:98.5 °F (36.9 °C)      I/O (24Hr):    Intake/Output Summary (Last 24 hours) at 18 0715  Last data filed at 18 0517   Gross per 24 hour   Intake             1110 ml   Output              400 ml   Net              710 ml       Labs:  Lab Results (last 72 hours)     Procedure Component Value Units Date/Time    CBC & Differential [[id number]] Collected:  18    Specimen:  Blood Updated:  18    Narrative:       The following orders were created for panel order CBC & Differential.  Procedure                               Abnormality         Status                     ---------                               -----------         ------                     CBC Auto Differential[[id number]] "         Abnormal            Final result                 Please view results for these tests on the individual orders.    CBC Auto Differential [[id number]]  (Abnormal) Collected:  02/01/18 1510    Specimen:  Blood Updated:  02/01/18 1519     WBC 15.52 (H) 10*3/mm3      RBC 3.86 (L) 10*6/mm3      Hemoglobin 11.9 (L) g/dL      Hematocrit 36.8 (L) %      MCV 95.3 fL      MCH 30.8 pg      MCHC 32.3 (L) g/dL      RDW 14.2 %      RDW-SD 48.9 fl      MPV 9.2 fL      Platelets 460 (H) 10*3/mm3      Neutrophil % 87.6 (H) %      Lymphocyte % 6.9 (L) %      Monocyte % 5.1 %      Eosinophil % 0.0 %      Basophil % 0.1 %      Immature Grans % 0.3 %      Neutrophils, Absolute 13.59 (H) 10*3/mm3      Lymphocytes, Absolute 1.07 10*3/mm3      Monocytes, Absolute 0.79 10*3/mm3      Eosinophils, Absolute 0.00 10*3/mm3      Basophils, Absolute 0.02 10*3/mm3      Immature Grans, Absolute 0.05 (H) 10*3/mm3      nRBC 0.0 /100 WBC     Protime-INR [[id number]]  (Normal) Collected:  02/01/18 1510    Specimen:  Blood Updated:  02/01/18 1528     Protime 14.1 Seconds      INR 1.06    aPTT [[id number]]  (Normal) Collected:  02/01/18 1510    Specimen:  Blood Updated:  02/01/18 1528     PTT 32.0 seconds     Comprehensive Metabolic Panel [[id number]]  (Abnormal) Collected:  02/01/18 1510    Specimen:  Blood Updated:  02/01/18 1531     Glucose 117 (H) mg/dL      BUN 34 (H) mg/dL      Creatinine 0.85 mg/dL      Sodium 139 mmol/L      Potassium 4.4 mmol/L      Chloride 97 (L) mmol/L      CO2 27.0 mmol/L      Calcium 8.7 mg/dL      Total Protein 7.6 g/dL      Albumin 3.70 g/dL      ALT (SGPT) 36 U/L      AST (SGOT) 64 (H) U/L      Alkaline Phosphatase 94 U/L      Total Bilirubin 0.9 mg/dL      eGFR Non African Amer 63 mL/min/1.73      Globulin 3.9 gm/dL      A/G Ratio 0.9 (L) g/dL      BUN/Creatinine Ratio 40.0 (H)     Anion Gap 15.0 (H) mmol/L     Narrative:       The MDRD GFR formula is only valid for adults with stable renal function between ages 18 and  70.    CK [[id number]]  (Abnormal) Collected:  02/01/18 1510    Specimen:  Blood Updated:  02/01/18 1531     Creatine Kinase 746 (H) U/L     Urinalysis With / Culture If Indicated - Urine, Catheter [[id number]]  (Abnormal) Collected:  02/01/18 1656    Specimen:  Urine from Urine, Catheter Updated:  02/01/18 1706     Color, UA Yellow     Appearance, UA Clear     pH, UA 5.5     Specific Gravity, UA 1.024     Glucose, UA Negative     Ketones, UA 15 mg/dL (1+) (A)     Bilirubin, UA Negative     Blood, UA Small (1+) (A)     Protein, UA 30 mg/dL (1+) (A)     Leuk Esterase, UA Trace (A)     Nitrite, UA Positive (A)     Urobilinogen, UA 0.2 E.U./dL    Urinalysis, Microscopic Only - Urine, Clean Catch [[id number]]  (Abnormal) Collected:  02/01/18 1656    Specimen:  Urine from Urine, Catheter Updated:  02/01/18 1706     RBC, UA 0-2 (A) /HPF      WBC, UA 13-20 (A) /HPF      Bacteria, UA 4+ (A) /HPF      Squamous Epithelial Cells, UA 3-6 (A) /HPF      Hyaline Casts, UA 3-6 /LPF      Methodology Automated Microscopy    CBC (No Diff) [[id number]]  (Abnormal) Collected:  02/02/18 0605    Specimen:  Blood Updated:  02/02/18 0627     WBC 13.94 (H) 10*3/mm3      RBC 3.57 (L) 10*6/mm3      Hemoglobin 11.1 (L) g/dL      Hematocrit 34.4 (L) %      MCV 96.4 fL      MCH 31.1 pg      MCHC 32.3 (L) g/dL      RDW 14.0 %      RDW-SD 49.8 fl      MPV 9.3 fL      Platelets 443 (H) 10*3/mm3     Basic Metabolic Panel [[id number]]  (Abnormal) Collected:  02/02/18 0605    Specimen:  Blood Updated:  02/02/18 0639     Glucose 80 mg/dL      BUN 32 (H) mg/dL      Creatinine 1.00 mg/dL      Sodium 140 mmol/L      Potassium 3.9 mmol/L      Chloride 99 mmol/L      CO2 28.0 mmol/L      Calcium 8.7 mg/dL      eGFR Non African Amer 52 (L) mL/min/1.73      BUN/Creatinine Ratio 32.0 (H)     Anion Gap 13.0 mmol/L     Narrative:       The MDRD GFR formula is only valid for adults with stable renal function between ages 18 and 70.    Iron Profile [[id number]]  (Abnormal)  Collected:  02/02/18 0605    Specimen:  Blood Updated:  02/02/18 0644     Iron 28 (L) mcg/dL      TIBC 261 mcg/dL      Iron Saturation 11 (L) %     Vitamin D 25 Hydroxy [[id number]]  (Abnormal) Collected:  02/02/18 0605    Specimen:  Blood Updated:  02/02/18 0654     25 Hydroxy, Vitamin D 14.5 (L) ng/ml     Folate [[id number]] Collected:  02/02/18 0605    Specimen:  Blood Updated:  02/02/18 0743     Folate >20.00 ng/mL     Vitamin B12 [[id number]]  (Normal) Collected:  02/02/18 0605    Specimen:  Blood Updated:  02/02/18 0743     Vitamin B-12 830 pg/mL     Urine Culture - Urine, Urine, Clean Catch [[id number]]  (Abnormal)  (Susceptibility) Collected:  02/01/18 1656    Specimen:  Urine from Urine, Catheter Updated:  02/02/18 1441     Urine Culture --      >100,000 CFU/mL Escherichia coli (A)    Susceptibility      Escherichia coli     FRANCISCA     Ampicillin <=2 ug/ml Susceptible     Ampicillin + Sulbactam <=2 ug/ml Susceptible     Cefazolin <=4 ug/ml Susceptible  [1]      Cefepime <=1 ug/ml Susceptible     Ceftriaxone <=1 ug/ml Susceptible     Ertapenem <=0.5 ug/ml Susceptible     ESBL Confirmation Test NEG  Negative     Gentamicin <=1 ug/ml Susceptible     Levofloxacin <=0.12 ug/ml Susceptible     Meropenem <=0.25 ug/ml Susceptible     Nitrofurantoin <=16 ug/ml Susceptible     Piperacillin + Tazobactam <=4 ug/ml Susceptible     Trimethoprim + Sulfamethoxazole <=20 ug/ml Susceptible            [1]   Cefazolin results may be used to predict the potential effectiveness of oral cephalosporins for treating uncomplicated urinary tract infections.                 Blood Culture - Blood, [[id number]]  (Normal) Collected:  02/01/18 1708    Specimen:  Blood from Arm, Right Updated:  02/02/18 1731     Blood Culture No growth at 24 hours    Blood Culture - Blood, [[id number]]  (Normal) Collected:  02/01/18 1713    Specimen:  Blood from Arm, Left Updated:  02/02/18 1731     Blood Culture No growth at 24 hours    CBC (No Diff) [[id number]]   (Abnormal) Collected:  02/03/18 0417    Specimen:  Blood Updated:  02/03/18 0439     WBC 9.89 10*3/mm3      RBC 2.96 (L) 10*6/mm3      Hemoglobin 9.3 (L) g/dL      Hematocrit 28.5 (L) %      MCV 96.3 fL      MCH 31.4 pg      MCHC 32.6 (L) g/dL      RDW 14.2 %      RDW-SD 49.5 fl      MPV 9.4 fL      Platelets 381 10*3/mm3     Basic Metabolic Panel [[id number]]  (Abnormal) Collected:  02/03/18 0417    Specimen:  Blood Updated:  02/03/18 0453     Glucose 79 mg/dL      BUN 32 (H) mg/dL      Creatinine 1.04 mg/dL      Sodium 138 mmol/L      Potassium 4.1 mmol/L      Chloride 102 mmol/L      CO2 24.0 mmol/L      Calcium 8.5 mg/dL      eGFR Non African Amer 50 (L) mL/min/1.73      BUN/Creatinine Ratio 30.8 (H)     Anion Gap 12.0 mmol/L     Narrative:       The MDRD GFR formula is only valid for adults with stable renal function between ages 18 and 70.    TSH [[id number]]  (Abnormal) Collected:  02/03/18 0417    Specimen:  Blood Updated:  02/03/18 0521     TSH 52.200 (H) mIU/mL             Physical Exam:     Left Hip: Incision is CDI, Dressing is CDI. Moderate tenderness to palpation, compartments soft. NVI distally throughout. Continued significant bilateral LE edema. Right foot quite edematous with mild stasis dermatitis over dorsal area.      Assessment:     Primary Problem  POD 1 Day Post-Op HIP HEMIARTHROPLASTY (Left)           Plan:     1. Continue PT/OT.  2. Continue DVT prophylaxis 28 days post op.  3. Continue WBAT left lower extremity.  4. Continue posterior hip precautions for six weeks post op.  5. Patient has been living alone and will need placement upon discharge.       Electronically signed by Mathieu Bermudez Jr., PA on 2/3/2018 at 7:15 AM

## 2018-02-03 NOTE — PLAN OF CARE
Problem: Fall Risk (Adult)  Goal: Absence of Falls  Outcome: Ongoing (interventions implemented as appropriate)      Problem: Pressure Ulcer Risk (To Scale) (Adult,Obstetrics,Pediatric)  Goal: Skin Integrity  Outcome: Ongoing (interventions implemented as appropriate)      Problem: Patient Care Overview (Adult)  Goal: Adult Individualization and Mutuality  Outcome: Ongoing (interventions implemented as appropriate)

## 2018-02-03 NOTE — PLAN OF CARE
Problem: Patient Care Overview (Adult)  Goal: Plan of Care Review  Outcome: Ongoing (interventions implemented as appropriate)   02/03/18 1418   Coping/Psychosocial Response Interventions   Plan Of Care Reviewed With patient;caregiver;son   Outcome Evaluation   Outcome Summary/Follow up Plan CBSE completed. Full range of consistencies presented. Pt had an immediate cough following thin liquid via straw 1x. No overt s/s of aspiration were observed with thin liquids via cup. She had adequate mastication of the regular solid. No oral residue was observed. Nursing reports pt coughing with thin liquids this morning. Recommend continuing regular solid diet and thin liquids, but avoiding straw use with liquids, and encouraging pt to drink from the cup instead. Meds whole with applesauce.        Problem: Inpatient SLP  Goal: Dysphagia- Patient will safely consume diet as per recommendation with no signs/symptoms of aspiration  Outcome: Ongoing (interventions implemented as appropriate)   02/03/18 1418   Safely Consume Diet   Safely Consume Diet- SLP, Date Established 02/03/18   Safely Consume Diet- SLP, Time to Achieve 2 days   Safely Consume Diet- SLP, Additional Goal Pt will tolerate trials of current diet with no overt s/s of aspiration.   Safely Consume Diet- SLP, Outcome goal ongoing

## 2018-02-03 NOTE — ANESTHESIA POSTPROCEDURE EVALUATION
"Patient: Andres Walter    Procedure Summary     Date Anesthesia Start Anesthesia Stop Room / Location    02/02/18 [id number] [facility] / [facility]       Procedure Diagnosis Surgeon Provider    HIP HEMIARTHROPLASTY (Left Hip) No diagnosis on file. MD Cody Dempsey CRNA          Anesthesia Type: general  Last vitals  BP   110/40 (02/02/18 1843)   Temp   97.4 °F (36.3 °C) (02/02/18 1838)   Pulse   50 (02/02/18 1838)   Resp   16 (02/02/18 1838)     SpO2   97 % (02/02/18 1838)     Post Anesthesia Care and Evaluation    Patient location during evaluation: PACU  Patient participation: complete - patient cannot participate  Level of consciousness: awake and combative (returned to preop baseline)  Pain score: 0  Pain management: adequate  Airway patency: patent  Anesthetic complications: No anesthetic complications    Cardiovascular status: acceptable and stable  Respiratory status: acceptable and unassisted  Hydration status: acceptable    Comments: Blood pressure 110/40, pulse 50, temperature 97.4 °F (36.3 °C), temperature source Axillary, resp. rate 16, height 157.5 cm (62\"), weight 45.4 kg (100 lb), SpO2 97 %.        "

## 2018-02-03 NOTE — PLAN OF CARE
Problem: Patient Care Overview (Adult)  Goal: Plan of Care Review  Outcome: Ongoing (interventions implemented as appropriate)   02/03/18 1642   Coping/Psychosocial Response Interventions   Plan Of Care Reviewed With patient;padmini   Patient Care Overview   Progress improving   Outcome Evaluation   Outcome Summary/Follow up Plan Pt is alert and orientated to person place and situation. Cooperative and hopeful.

## 2018-02-03 NOTE — THERAPY EVALUATION
Acute Care - Occupational Therapy Initial Evaluation  [facility]     Patient Name: Andres Walter  : 1929  MRN: [id number]  Today's Date: 2/3/2018  Onset of Illness/Injury or Date of Surgery Date: 18  Date of Referral to OT: (P) 18  Referring Physician: Dr. Barger    Admit Date: 2018       ICD-10-CM ICD-9-CM   1. Fall, initial encounter W19.XXXA E888.9   2. Closed fracture of left hip, initial encounter S72.002A 820.8   3. Pneumonia of both lungs due to infectious organism, unspecified part of lung J18.9 483.8   4. Acute UTI N39.0 599.0   5. Abnormality of gait and mobility R26.9 781.2   6. Dysphagia, unspecified type R13.10 787.20   7. Impaired mobility and ADLs Z74.09 799.89     Patient Active Problem List   Diagnosis   • Fall     Past Medical History:   Diagnosis Date   • Arthritis    • Encephalopathy    • Hypertension      Past Surgical History:   Procedure Laterality Date   • TOTAL HIP ARTHROPLASTY Left 2018          OT ASSESSMENT FLOWSHEET (last 72 hours)      OT Evaluation       18 1200 18 1130 18 0900 18 0840 18 2143    Rehab Evaluation    Document Type evaluation  -TB evaluation  -MB (P)  evaluation  -MK evaluation  -TB     Subjective Information agree to therapy;complains of;pain  -TB agree to therapy;complains of;pain  -MB (P)  agree to therapy;complains of;weakness;fatigue;pain  -      Evaluation Not Performed    --   eating  -TB     Patient Effort, Rehab Treatment adequate  -TB        Symptoms Noted Comment   (P)  eval performed at 1300  -      General Information    Patient Profile Review yes  -TB  (P)  yes  -MK yes  -TB     Onset of Illness/Injury or Date of Surgery Date 18  -TB  (P)  18  -MK 18  -TB     Referring Physician Dr. Barger  -TB  (P)  Dr. Barger  -MK Charbel  -TB     General Observations Fowlers position; awake and alert with IV, FC; benja dorsum of feet swollen and red, right worse than left and fully PF'd; hip abd  pillow between knees and pillows under lower legs and feet; no pressure areas noted on posterior heels  -TB  (P)  in fowlers, edema present in B feet, contusion to L elbow and R eye  -MK      Pertinent History Of Current Problem Patient has h/o declining mental status. She was found in the floor and found to have a fractured left hip. Her son says prior to this, she was not walking much.  -TB  (P)  declining mental status, XR 2/1 L subcapital femoral neck fx; s/p L hip hemiarthroplasty 2/2/18  -MK Patient has h/o declining mental status. She was found in the floor and found to have a fractured left hip.  -TB     Precautions/Limitations fall precautions;hip precautions- left  -TB  (P)  fall precautions;hip precautions- left   confusion  -MK      Prior Level of Function all household mobility;gait;independent:  -TB  (P)  independent:;all household mobility;community mobility;ADL's;dependent:;driving   unsure of validity of pt report of PLOF  -MK      Equipment Currently Used at Home none  -TB  (P)  bath bench   has tub bench, reports not using recently  -MK none  -TB     Plans/Goals Discussed With patient;family;agreed upon   son  -TB  (P)  patient;agreed upon  -MK      Risks Reviewed patient:;family:;LOB;increased discomfort  -TB  (P)  patient:;LOB;nausea/vomiting;dizziness;increased discomfort;lines disloged  -MK      Benefits Reviewed patient:;family:;improve function;increase independence;increase strength;decrease pain  -TB  (P)  patient:;improve function;increase independence;increase strength;increase balance;decrease pain;increase knowledge  -MK      Barriers to Rehab cognitive status;physical barrier;previous functional deficit  -TB  (P)  cognitive status;physical barrier  -MK cognitive status;physical barrier  -TB     Living Environment    Lives With alone  -TB  (P)  alone  -MK alone  -TB alone  -AD    Living Arrangements house  -TB  (P)  house  -MK house  -TB house  -AD    Home Accessibility stairs within  home;stairs to enter home  -TB  (P)  stairs within home;stairs to enter home;tub/shower is not walk in  -MK  stairs within home;stairs to enter home  -AD    Number of Stairs to Enter Home   (P)  3  -MK      Stair Railings at Home inside, present on right side  -TB  (P)  inside, present on right side  -MK  inside, present on right side  -AD    Type of Financial/Environmental Concern none  -TB    none  -AD    Transportation Available family or friend will provide  -TB   family or friend will provide  -TB family or friend will provide  -AD    Living Environment Comment she says she walked alot in her house but her son says she hadn't been walking much lately  -TB  (P)  reports I with amb and ADL, unsure of validity of report due to cognition, neighbors and friends check on her often and report decline in last month, per H&P and nurse ADL declined in last month  -MK      Clinical Impression    Date of Referral to OT   (P)  02/02/18  -MK      OT Diagnosis   (P)  decreased ADL  -MK      Prognosis   (P)  good  -MK      Impairments Found (describe specific impairments)   (P)  aerobic capacity/endurance;arousal, attention, and cognition;ergonomics and body mechanics;gait, locomotion, and balance  -      Criteria for Skilled Therapeutic Interventions Met   (P)  yes;treatment indicated  -      Rehab Potential   (P)  fair, will monitor progress closely  -      Therapy Frequency   (P)  3-5 times/wk  -      Predicted Duration of Therapy Intervention (days/wks)   (P)  until d/c  -MK      Anticipated Discharge Disposition   (P)  skilled nursing facility  -      Vital Signs    Posttreatment Heart Rate (beats/min)   (P)  65  -MK      Post SpO2 (%)   (P)  98  -MK      O2 Delivery Post Treatment   (P)  room air  -MK      Post Patient Position   (P)  Supine  -MK      Pain Assessment    Pain Assessment Tello-Baker FACES  -TB  (P)  0-10  -MK      Tello-Cabral FACES Pain Rating 8  -TB        Pain Score   (P)  5  -MK      Pain  Location Hip  -TB Hip  -MB (P)  Hip  -MK      Pain Orientation Right;Left  -TB  (P)  Left;Right  -MK      Pain Descriptors   (P)  Aching;Sore  -MK      Pain Frequency Constant/continuous  -TB  (P)  Constant/continuous  -MK      Pain Onset Sudden  -TB        Effect of Pain on Daily Activities says right hip hurts worse than left; also c/o benja ankle/foot pain  -TB        Patient's Stated Pain Goal No pain  -TB        Pain Intervention(s) Repositioned  -TB  (P)  Repositioned  -MK      Response to Interventions improved  -TB  (P)  tolerated  -MK      Vision Assessment/Intervention    Visual Impairment WFL  -TB  (P)  WFL  -MK      Cognitive Assessment/Intervention    Current Cognitive/Communication Assessment impaired  -TB impaired  -MB (P)  impaired  -MK      Orientation Status oriented to;person;unable/difficult to assess  -TB  (P)  oriented to;person  -MK      Follows Commands/Answers Questions able to follow single-step instructions  -TB 75% of the time  -MB (P)  75% of the time;able to follow single-step instructions;needs repetition  -MK      Personal Safety severe impairment  -TB  (P)  moderate impairment  -MK      Personal Safety Interventions gait belt;fall prevention program maintained;elopement precautions initiated;nonskid shoes/slippers when out of bed  -TB  (P)  fall prevention program maintained;supervised activity  -MK      Short/Long Term Memory severe impairment, short term memory   had to remind of surgery and POC every couple of minutes  -TB  (P)  moderate impairment, short term memory   asked location of son multiple times  -MK      ROM (Range of Motion)    General ROM lower extremity range of motion deficits identified  -TB        General ROM Detail benja UE WFL  -TB  (P)  BUE WFL AROM  -MK      MMT (Manual Muscle Testing)    General MMT Assessment Detail grossly 3+/5 4-/5 benja LE; mostly limited by fear  -TB  (P)   5/5, other joints not assessed due to significant bruising  -MK      Mobility  Assessment/Training    Extremity Weight-Bearing Status left lower extremity  -TB  (P)  right lower extremity  -MK      Left Lower Extremity Weight-Bearing full weight-bearing  -TB  (P)  full weight-bearing  -      Bed Mobility, Assessment/Treatment    Bed Mobility, Scoot/Bridge, [place] moderate assist (50% patient effort);verbal cues required   able to push with feet; able to scoot laterally with cues  -TB        Bed Mob, Supine to Sit, [place] verbal cues required;maximum assist (25% patient effort)  -TB        Bed Mob, Sit to Supine, [place] verbal cues required;maximum assist (25% patient effort)  -TB        Bed Mobility, Comment   (P)  pt refused due to fatigue  -      Transfer Assessment/Treatment    Transfers, Sit-Stand [place] maximum assist (25% patient effort);2 person assist required  -TB        Transfers, Stand-Sit [place] maximum assist (25% patient effort);2 person assist required  -TB        Transfers, Sit-Stand-Sit, Assist Device rolling walker  -TB        Transfer, Comment pushed posterior throughout; avoided standing fully erect with weight on benja feet; able to stand about 75% erect with max asst and gait belt; stood about 2 min  -TB  (P)  pt refused due to fatigue  -      Grooming Assessment/Training    Grooming Assess/Train, Position   (P)  long sitting  -      Grooming Assess/Train, Indepen Level   (P)  set up required  -      Grooming Assess/Train, Comment   (P)  pt washed her face and brush hair, able to reach all aspects of face  -      Therapy Exercises    Right Lower Extremity supine;heel slides   passive sustained ankle DF stretch 10 min  -TB  --  -TB      Left Lower Extremity AAROM:;heel slides;supine   passive ankle DF stretch 5 minutes  -TB  --  -TB      Sensory Assessment/Intervention    Sensory Impairment --   intact  -TB  --  -TB      General Therapy Interventions    Planned Therapy Interventions   (P)  ADL retraining;balance training;bed mobility  training;strengthening;transfer training;adaptive equipment training  -      Balance Training  focus on balance before walking  -TB --  -TB      Stretching  stretch benja ankle DF  -TB --  -TB      Positioning and Restraints    Pre-Treatment Position  in bed  -TB (P)  in bed  -      Post Treatment Position  bed  -TB (P)  bed  -MK      In Bed  supine;call light within reach;with family/caregiver;ABD pillow;heels elevated   pillow bracing feet into DF  -TB (P)  fowlers;call light within reach;encouraged to call for assist;with nsg;with other staff;side rails up x2  -MK      General LE Assessment    ROM Detail able to heel slide benja hips to 60 deg; right ankle passive DF to -10; right ankle DF to 0 after sustained stretching  -TB          02/01/18 5995                General Information    Equipment Currently Used at Home none  -AD        Functional Level Prior    Ambulation 0-->independent  -AD        Transferring 0-->independent  -AD        Toileting 0-->independent  -AD        Bathing 0-->independent  -AD        Dressing 0-->independent  -AD        Eating 0-->independent  -AD        Communication 0-->understands/communicates without difficulty  -AD        Swallowing 0-->swallows foods/liquids without difficulty  -AD          User Key  (r) = Recorded By, (t) = Taken By, (c) = Cosigned By    Initials Name Effective Dates    JANETH Jacinto, ZAY-SLP 08/02/16 -     TB Josias White, PT 08/02/16 -     AD Anni Rosa RN 08/02/16 -      Gabbi Cavazos, OT Student 11/20/17 -            Occupational Therapy Education     Title: PT OT SLP Therapies (Active)     Topic: Occupational Therapy (Done)     Point: ADL training (Done)    Description: Instruct learner(s) on proper safety adaptation and remediation techniques during self care or transfers.   Instruct in proper use of assistive devices.    Learning Progress Summary    Learner Readiness Method Response Comment Documented by Status   Patient Acceptance E SHAMA,NR  benefits of OT, benefit of activity  02/03/18 1419 Done               Point: Precautions (Done)    Description: Instruct learner(s) on prescribed precautions during self-care and functional transfers.    Learning Progress Summary    Learner Readiness Method Response Comment Documented by Status   Patient Acceptance E VU,NR benefits of OT, benefit of activity  02/03/18 1419 Done               Point: Body mechanics (Done)    Description: Instruct learner(s) on proper positioning and spine alignment during self-care, functional mobility activities and/or exercises.    Learning Progress Summary    Learner Readiness Method Response Comment Documented by Status   Patient Acceptance E VU,NR benefits of OT, benefit of activity  02/03/18 1419 Done                      User Key     Initials Effective Dates Name Provider Type Discipline     11/20/17 -  Gabbi Cavazos OT Student OT Student OT                  OT Recommendation and Plan  Anticipated Discharge Disposition: (P) skilled nursing facility  Planned Therapy Interventions: (P) ADL retraining, balance training, bed mobility training, strengthening, transfer training, adaptive equipment training  Therapy Frequency: (P) 3-5 times/wk  Plan of Care Review  Plan Of Care Reviewed With: (P) patient  Progress: (P) no change  Outcome Summary/Follow up Plan: (P) OT eval completed. Pt awake and oriented to person, decreased recall with recent events. Reports PLOF I with all mobility and ADL. Son and neighbors state decline in independence over last month. Agreeable to complete grooming tasks in bed but stated too fatigued for bed mobility and transfer. Pt repeats questions frequently, short term memory significantly impaired. Due to altered mental status and decline in functional mobility from baseline, pt at significant risk for falls. SKilled OT required to address ADL retraining, pt education for precautions, and increased endurance for safety and independence for d/c. OT  will monitor pt progress closely and d/c if unable to participate and make functional improvements. OT recommends d/c to skilled facility.           OT Goals       02/03/18 1432          Transfer Training OT LTG    Transfer Training OT LTG, Date Established (P)  02/03/18  -      Transfer Training OT LTG, Time to Achieve (P)  by discharge  -      Transfer Training OT LTG, Activity Type (P)  toilet;tub  -      Transfer Training OT LTG, [place] Level (P)  moderate assist (50% patient effort)  -      Transfer Training OT LTG, Assist Device (P)  tub bench;commode, bedside  -      Patient Education OT LTG    Patient Education OT LTG, Date Established (P)  02/03/18  -      Patient Education OT LTG, Time to Achieve (P)  by discharge  -      Patient Education OT LTG, Education Type (P)  precautions per surgeon;home safety;adaptive equipment mgmt  -      Patient Education OT LTG, Education Understanding (P)  demonstrates adequately  -      Patient Education OT LTG, Additional Goal (P)  with min vc  -      Toileting OT LTG    Toileting Goal OT LTG, Date Established (P)  02/03/18  -      Toileting Goal OT LTG, Time to Achieve (P)  by discharge  -      Toileting Goal OT LTG, [place] Level (P)  moderate assist (50% patient effort)  -      LB Dressing OT LTG    LB Dressing Goal OT LTG, Date Established (P)  02/03/18  -      LB Dressing Goal OT LTG, Time to Achieve (P)  by discharge  -      LB Dressing Goal OT LTG, [place] Level (P)  moderate assist (50% patient effort)  -      LB Dressing Goal OT LTG, Adaptive Equipment (P)  reacher;sock-aid;dressing stick  -        User Key  (r) = Recorded By, (t) = Taken By, (c) = Cosigned By    Initials Name Provider Type    DIONNE Cavazos OT Student OT Student                Outcome Measures       02/03/18 1400 02/03/18 1200       How much help from another person do you currently need...    Turning from your back to your side while in flat bed  without using bedrails?  2  -TB     Moving from lying on back to sitting on the side of a flat bed without bedrails?  2  -TB     Moving to and from a bed to a chair (including a wheelchair)?  1  -TB     Standing up from a chair using your arms (e.g., wheelchair, bedside chair)?  2  -TB     Climbing 3-5 steps with a railing?  1  -TB     To walk in hospital room?  1  -TB     AM-PAC 6 Clicks Score  9  -TB     How much help from another is currently needed...    Putting on and taking off regular lower body clothing? (P)  2  -MK      Bathing (including washing, rinsing, and drying) (P)  2  -MK      Toileting (which includes using toilet bed pan or urinal) (P)  2  -MK      Putting on and taking off regular upper body clothing (P)  3  -MK      Taking care of personal grooming (such as brushing teeth) (P)  3  -MK      Eating meals (P)  3  -MK      Score (P)  15  -AC (r) MK (t)      Functional Assessment    Outcome Measure Options (P)  AM-PAC 6 Clicks Daily Activity (OT)  -MK AM-PAC 6 Clicks Basic Mobility (PT)  -TB       User Key  (r) = Recorded By, (t) = Taken By, (c) = Cosigned By    Initials Name Provider Type    TB Josias White, PT Physical Therapist    EV Jacobsen, OTR/L Occupational Therapist    DIONNE Cavazos, OT Student OT Student          Time Calculation:   OT Start Time: (P) 1300  OT Stop Time: (P) 1354  OT Time Calculation (min): (P) 54 min    Therapy Charges for Today     Code Description Service Date Service Provider Modifiers Qty    [id number]  OT EVAL MOD COMPLEXITY 4 2/3/2018 Gabbi Cavazos, OT Student GO, KX 1          OT G-codes  OT Professional Judgement Used?: (P) Yes  OT Functional Scales Options: (P) AM-PAC 6 Clicks Daily Activity (OT)  Score: (P) 15  Functional Limitation: (P) Self care  Self Care Current Status (): (P) At least 40 percent but less than 60 percent impaired, limited or restricted  Self Care Goal Status (): (P) At least 20 percent but less than 40 percent impaired,  limited or restricted    Gabbi Cavazos, OT Student  2/3/2018

## 2018-02-03 NOTE — PLAN OF CARE
Problem: Patient Care Overview (Adult)  Goal: Plan of Care Review  Outcome: Ongoing (interventions implemented as appropriate)   02/03/18 1229   Coping/Psychosocial Response Interventions   Plan Of Care Reviewed With patient;son   Outcome Evaluation   Outcome Summary/Follow up Plan PT macarioal done. Patient very resistant to standing but was eventually talked into it. Her benja feet are painful and tight into PF and says her right hip hurts worse than her left hip, but she was able to active slide both of her heels up in bed. Stood with max asst x 2 with rwx but only about 75% upright at best; she tends to push herself posterior likely mostly out of fear.        Problem: Inpatient Physical Therapy  Goal: Bed Mobility Goal LTG- PT  Outcome: Ongoing (interventions implemented as appropriate)   02/03/18 1229   Bed Mobility PT LTG   Bed Mobility PT LTG, Date Established 02/03/18   Bed Mobility PT LTG, Time to Achieve by discharge   Bed Mobility PT LTG, Activity Type all bed mobility   Bed Mobility PT LTG, [place] Level supervision required     Goal: Transfer Training Goal 1 LTG- PT  Outcome: Ongoing (interventions implemented as appropriate)   02/03/18 1229   Transfer Training PT LTG   Transfer Training PT LTG, Date Established 02/03/18   Transfer Training PT LTG, Time to Achieve by discharge   Transfer Training PT LTG, Activity Type all transfers   Transfer Training PT LTG, [place] Level contact guard assist   Transfer Training PT LTG, Assist Device walker, rolling     Goal: Gait Training Goal LTG- PT  Outcome: Ongoing (interventions implemented as appropriate)   02/03/18 1229   Gait Training PT LTG   Gait Training Goal PT LTG, Date Established 02/03/18   Gait Training Goal PT LTG, Time to Achieve by discharge   Gait Training Goal PT LTG, [place] Level contact guard assist   Gait Training Goal PT LTG, Assist Device walker, rolling   Gait Training Goal PT LTG, Distance to Achieve 20   Gait Training Goal PT LTG,  Additional Goal able to stand upright without losing balance posterior with min limping     Goal: Range of Motion Goal LTG- PT  Outcome: Ongoing (interventions implemented as appropriate)

## 2018-02-03 NOTE — PLAN OF CARE
"Problem: Fall Risk (Adult)  Goal: Absence of Falls  Outcome: Ongoing (interventions implemented as appropriate)      Problem: Pressure Ulcer Risk (To Scale) (Adult,Obstetrics,Pediatric)  Goal: Skin Integrity  Outcome: Ongoing (interventions implemented as appropriate)      Problem: Patient Care Overview (Adult)  Goal: Plan of Care Review  Outcome: Ongoing (interventions implemented as appropriate)   02/03/18 0333   Coping/Psychosocial Response Interventions   Plan Of Care Reviewed With patient   Patient Care Overview   Progress no change   Outcome Evaluation   Outcome Summary/Follow up Plan Pt still confused after surgery, tells nursing staff to \"stop\" when she is touched, assessed, or repositioned. BLE edema and bruising, bruising throughout body, and R periorbital bruise. Pt denies pain. Free from falls.        Problem: Hip Replacement, Total (Adult)  Goal: Signs and Symptoms of Listed Potential Problems Will be Absent or Manageable (Hip Replacement, Total)  Outcome: Ongoing (interventions implemented as appropriate)        "

## 2018-02-03 NOTE — PROGRESS NOTES
[facility]  BARBI Mckay M.D.  AMARIS Emmanuel      Internal Medicine Progress Note    2/3/2018   12:22 PM    Name:  Andres Walter  MRN:    [id number]     Acct:     [id number]   Room:  [address] [place] [address] Day: 2     Admit Date: 2/1/2018  2:22 PM  PCP: No Known Provider    Subjective:     C/C:   Chief Complaint   Patient presents with   • Fall   • Hip Pain       Interval History: Status:  Improved but now having pain in feet s/p fall.    Review of Systems   Constitution: Positive for weakness. Negative for chills, fever, malaise/fatigue and weight loss.   HENT: Negative for congestion, hoarse voice and nosebleeds.    Eyes: Negative for blurred vision and photophobia.   Cardiovascular: Negative for chest pain, dyspnea on exertion, near-syncope and orthopnea.   Respiratory: Negative for cough, shortness of breath and sputum production.    Endocrine: Negative for cold intolerance and polyuria.   Hematologic/Lymphatic: Negative for adenopathy and bleeding problem.   Skin: Negative for dry skin, itching and rash.   Musculoskeletal: Positive for joint swelling and myalgias. Negative for arthritis, back pain and joint pain.   Gastrointestinal: Negative for abdominal pain, diarrhea, heartburn, nausea and vomiting.   Genitourinary: Negative for dysuria and flank pain.   Neurological: Negative for dizziness, headaches, paresthesias and seizures.   Psychiatric/Behavioral: Negative for altered mental status and memory loss. The patient is not nervous/anxious.    Allergic/Immunologic: Negative for environmental allergies and hives.         Medications:     Allergies: No Known Allergies    Current Meds:   Current Facility-Administered Medications:   •  bisoprolol (ZEBeta) 5 mg, hydrochlorothiazide (HYDRODIURIL) 6.25 mg for Ziac 5-6.25, , Oral, Q24H, Som Roach MD  •  ceFAZolin (ANCEF) 1 g/10ml IV PUSH syringe, 1 g, Intravenous, Q8H, Emory Barger MD, 1 g at 02/03/18 0438  •  cefTRIAXone  (ROCEPHIN) 1 g/10mL IV PUSH syringe, 1 g, Intravenous, Q24H, Som Roach MD  •  enoxaparin (LOVENOX) syringe 30 mg, 30 mg, Subcutaneous, Q24H, Emory Barger MD, 30 mg at 02/03/18 0835  •  ferrous sulfate tablet 325 mg, 325 mg, Oral, BID With Meals, Melisa Lee Tolu, APRN, 325 mg at 02/03/18 0834  •  HYDROcodone-acetaminophen (NORCO)  MG per tablet 1 tablet, 1 tablet, Oral, Q4H PRN, Emory Barger MD  •  HYDROcodone-acetaminophen (NORCO) 5-325 MG per tablet 1 tablet, 1 tablet, Oral, Q4H PRN, Emory Barger MD, 1 tablet at 02/03/18 0833  •  Influenza Vac Subunit Quad (FLUCELVAX) injection 0.5 mL, 0.5 mL, Intramuscular, During Hospitalization, Som Roach MD  •  lactated ringers infusion, 9 mL/hr, Intravenous, Continuous, Krishna Moon MD, Stopped at 02/02/18 1803  •  levothyroxine (SYNTHROID, LEVOTHROID) tablet 100 mcg, 100 mcg, Oral, Q AM, Som Roach MD, 100 mcg at 02/01/18 2038  •  Morphine sulfate (PF) injection 1 mg, 1 mg, Intravenous, Q4H PRN **AND** naloxone (NARCAN) injection 0.4 mg, 0.4 mg, Intravenous, Q5 Min PRN, Emory Barger MD  •  Morphine sulfate (PF) injection 4 mg, 4 mg, Intravenous, Q4H PRN **AND** naloxone (NARCAN) injection 0.4 mg, 0.4 mg, Intravenous, Q5 Min PRN, Som Roach MD  •  ondansetron (ZOFRAN) injection 4 mg, 4 mg, Intravenous, Q6H PRN, Som Roach MD  •  ondansetron (ZOFRAN) injection 4 mg, 4 mg, Intravenous, Q6H PRN, Emory Barger MD  •  ondansetron (ZOFRAN) tablet 4 mg, 4 mg, Oral, Q6H PRN, Emory Barger MD  •  pneumococcal polysaccharide 23-valent (PNEUMOVAX-23) vaccine 0.5 mL, 0.5 mL, Intramuscular, During Hospitalization, Som Roach MD  •  sodium chloride 0.9 % flush 1-10 mL, 1-10 mL, Intravenous, PRN, Som Roach MD  •  Insert peripheral IV, , , Once **AND** sodium chloride 0.9 % flush 10 mL, 10 mL, Intravenous, PRN, Gabriela Barrett MD  •  sodium chloride 0.9 % infusion, 100 mL/hr, Intravenous,  "Continuous, Emory Barger MD, Last Rate: 100 mL/hr at 18 0512, 100 mL/hr at 18 05    Data:     Code Status:  Full Code    History reviewed. No pertinent family history.    Social History     Social History   • Marital status:      Spouse name: N/A   • Number of children: N/A   • Years of education: N/A     Occupational History   • Not on file.     Social History Main Topics   • Smoking status: Never Smoker   • Smokeless tobacco: Never Used   • Alcohol use No   • Drug use: No   • Sexual activity: Defer     Other Topics Concern   • Not on file     Social History Narrative       Vitals:  /43 (BP Location: Right arm, Patient Position: Lying)  Pulse 72  Temp 97.8 °F (36.6 °C) (Oral)   Resp 18  Ht 157.5 cm (62\")  Wt 45.4 kg (100 lb)  SpO2 98%  BMI 18.29 kg/m2  Temp (24hrs), Av.5 °F (36.4 °C), Min:97.1 °F (36.2 °C), Max:98 °F (36.7 °C)            I/O (24Hr):    Intake/Output Summary (Last 24 hours) at 18 1222  Last data filed at 18 0517   Gross per 24 hour   Intake             1110 ml   Output              400 ml   Net              710 ml       Labs and imaging:      Lab Results (last 24 hours)     Procedure Component Value Units Date/Time    Urine Culture - Urine, Urine, Clean Catch [[id number]]  (Abnormal)  (Susceptibility) Collected:  18 1656    Specimen:  Urine from Urine, Catheter Updated:  18 1441     Urine Culture --      >100,000 CFU/mL Escherichia coli (A)    Susceptibility      Escherichia coli     FRANCISCA     Ampicillin <=2 ug/ml Susceptible     Ampicillin + Sulbactam <=2 ug/ml Susceptible     Cefazolin <=4 ug/ml Susceptible  [1]      Cefepime <=1 ug/ml Susceptible     Ceftriaxone <=1 ug/ml Susceptible     Ertapenem <=0.5 ug/ml Susceptible     ESBL Confirmation Test NEG  Negative     Gentamicin <=1 ug/ml Susceptible     Levofloxacin <=0.12 ug/ml Susceptible     Meropenem <=0.25 ug/ml Susceptible     Nitrofurantoin <=16 ug/ml Susceptible     Piperacillin + " Tazobactam <=4 ug/ml Susceptible     Trimethoprim + Sulfamethoxazole <=20 ug/ml Susceptible            [1]   Cefazolin results may be used to predict the potential effectiveness of oral cephalosporins for treating uncomplicated urinary tract infections.                 Blood Culture - Blood, [[id number]]  (Normal) Collected:  02/01/18 1708    Specimen:  Blood from Arm, Right Updated:  02/02/18 1731     Blood Culture No growth at 24 hours    Blood Culture - Blood, [[id number]]  (Normal) Collected:  02/01/18 1713    Specimen:  Blood from Arm, Left Updated:  02/02/18 1731     Blood Culture No growth at 24 hours    CBC (No Diff) [[id number]]  (Abnormal) Collected:  02/03/18 0417    Specimen:  Blood Updated:  02/03/18 0439     WBC 9.89 10*3/mm3      RBC 2.96 (L) 10*6/mm3      Hemoglobin 9.3 (L) g/dL      Hematocrit 28.5 (L) %      MCV 96.3 fL      MCH 31.4 pg      MCHC 32.6 (L) g/dL      RDW 14.2 %      RDW-SD 49.5 fl      MPV 9.4 fL      Platelets 381 10*3/mm3     Basic Metabolic Panel [[id number]]  (Abnormal) Collected:  02/03/18 0417    Specimen:  Blood Updated:  02/03/18 0453     Glucose 79 mg/dL      BUN 32 (H) mg/dL      Creatinine 1.04 mg/dL      Sodium 138 mmol/L      Potassium 4.1 mmol/L      Chloride 102 mmol/L      CO2 24.0 mmol/L      Calcium 8.5 mg/dL      eGFR Non African Amer 50 (L) mL/min/1.73      BUN/Creatinine Ratio 30.8 (H)     Anion Gap 12.0 mmol/L     Narrative:       The MDRD GFR formula is only valid for adults with stable renal function between ages 18 and 70.    TSH [[id number]]  (Abnormal) Collected:  02/03/18 0417    Specimen:  Blood Updated:  02/03/18 0521     TSH 52.200 (H) mIU/mL             Ct Head Without Contrast    Result Date: 2/1/2018  Narrative: CT HEAD WO CONTRAST- 2/1/2018 4:24 PM EST  HISTORY: fall ; altered mental status  COMPARISON: 12/28/2014  DOSE LENGTH PRODUCT: 1042 mGy cm. Automated exposure control was also utilized to decrease patient radiation dose.  TECHNIQUE: Axial images of  brain obtained without IV contrast.  FINDINGS:  There is cortical atrophy, greatest within the frontal region. No intracranial hemorrhage or mass effect is identified. No acute signs of ischemia. There is no extra-axial hematoma or subarachnoid hemorrhage.  There is near complete opacification of the bilateral sphenoid sinuses. Sphenoid opacification seen on the 2014 exam. There is mild mucosal thickening of the ethmoid sinuses. The mastoid air cells are well-aerated. The bony calvarium appears intact.      Impression: 1. Mild to moderate cortical atrophy, greatest within the frontal region. No acute intracranial abnormality. 2. Persistent chronic sphenoid opacification, chronic sinusitis. This report was finalized on 02/01/2018 15:44 by Dr. Shasta Crawford MD.    Xr Chest 1 View    Result Date: 2/1/2018  Narrative: EXAMINATION: XR CHEST 1 VW- 2/1/2018 16:12 CST  HISTORY: Fall, pain  Comparison: 12/20/2014  Findings: Heart and mediastinal contours appear normal. Increased interstitial markings are seen bilaterally with scattered patchy opacities. There is biapical pleural thickening. No pneumothorax is appreciated. There is no appreciable pleural effusion. The pulmonary vasculature appears normal.      Impression: Impression: Patchy opacities bilaterally are concerning for an infectious or inflammatory process. This report was finalized on 02/01/2018 16:13 by Dr. Cody Stanley MD.    Xr Hip With Or Without Pelvis 2 - 3 View Left    Result Date: 2/1/2018  Narrative: HISTORY: Fall with pain  Left hip: Frontal view the pelvis performed with frontal and cross table lateral views left hip.  There is a left subcapital femoral neck fracture with prominent impaction and superior displacement of the femoral neck in reference to the femoral head. No additional fracture is identified. Coarse pelvic calcification may represent a uterine leiomyoma. There are vascular calcifications.      Impression: 1. Impacted left subcapital  femoral neck fracture. This report was finalized on 02/01/2018 16:13 by Dr. Shasta Crawford MD.                        Physical Examination:        Physical Exam   Constitutional: She is oriented to person, place, and time. She appears well-developed and well-nourished.   HENT:   Head: Normocephalic and atraumatic.   Mouth/Throat: Oropharynx is clear and moist.   Eyes: Conjunctivae and EOM are normal. Pupils are equal, round, and reactive to light.   Neck: Normal range of motion. Neck supple.   Cardiovascular: Normal rate, regular rhythm, normal heart sounds and intact distal pulses.    Pulmonary/Chest: Effort normal and breath sounds normal.   Abdominal: Soft. Bowel sounds are normal. She exhibits no distension.   Musculoskeletal: Normal range of motion. She exhibits tenderness (pain with rom of bilateral lower extremeties). She exhibits no edema.   Neurological: She is alert and oriented to person, place, and time. No cranial nerve deficit.   Skin: Skin is warm and dry.   Psychiatric: She has a normal mood and affect. Her behavior is normal. Thought content normal.         Assessment:        Primary Problem  <principal problem not specified>     Active Problems:    Fall    Past Medical History:   Diagnosis Date   • Arthritis    • Encephalopathy    • Hypertension         Plan:        1. Impacted left subcapital femur fracture s/p fall  2. UTI - e. Coli - present on admission  3. HTN  4. Encephalopathy  5. Iron deficiency anemia  6. Hypothyroidism  7. HLD    Continue prn pain meds, continue iv fluids, continue abx, start therapy, replace iron, check xrays of bilateral ankles and feet      Electronically signed by Som Roach MD on 2/3/2018 at 12:22 PM

## 2018-02-03 NOTE — PLAN OF CARE
Problem: Patient Care Overview (Adult)  Goal: Plan of Care Review   02/03/18 1421   Coping/Psychosocial Response Interventions   Plan Of Care Reviewed With patient   Patient Care Overview   Progress no change   Outcome Evaluation   Outcome Summary/Follow up Plan OT noy completed. Pt awake and oriented to person, decreased recall with recent events. Reports PLOF I with all mobility and ADL. Son and neighbors state decline in independence over last month. Agreeable to complete grooming tasks in bed but stated too fatigued for bed mobility and transfer. Pt repeats questions frequently, short term memory significantly impaired. Due to altered mental status and decline in functional mobility from baseline, pt at significant risk for falls. SKilled OT required to address ADL retraining, pt education for precautions, and increased endurance for safety and independence for d/c. OT will monitor pt progress closely and d/c if unable to participate and make functional improvements. OT recommends d/c to skilled facility.        Problem: Inpatient Occupational Therapy  Goal: Transfer Training Goal 1 LTG- OT  Outcome: Ongoing (interventions implemented as appropriate)   02/03/18 1432   Transfer Training OT LTG   Transfer Training OT LTG, Date Established 02/03/18   Transfer Training OT LTG, Time to Achieve by discharge   Transfer Training OT LTG, Activity Type toilet;tub   Transfer Training OT LTG, [place] Level moderate assist (50% patient effort)   Transfer Training OT LTG, Assist Device tub bench;commode, bedside     Goal: Patient Education Goal LTG- OT  Outcome: Ongoing (interventions implemented as appropriate)   02/03/18 1432   Patient Education OT LTG   Patient Education OT LTG, Date Established 02/03/18   Patient Education OT LTG, Time to Achieve by discharge   Patient Education OT LTG, Education Type precautions per surgeon;home safety;adaptive equipment mgmt   Patient Education OT LTG, Education Understanding  demonstrates adequately   Patient Education OT LTG, Additional Goal with min vc     Goal: Toileting Goal LTG- OT  Outcome: Ongoing (interventions implemented as appropriate)   02/03/18 1432   Toileting OT LTG   Toileting Goal OT LTG, Date Established 02/03/18   Toileting Goal OT LTG, Time to Achieve by discharge   Toileting Goal OT LTG, [place] Level moderate assist (50% patient effort)     Goal: LB Dressing Goal LTG- OT  Outcome: Ongoing (interventions implemented as appropriate)   02/03/18 1432   LB Dressing OT LTG   LB Dressing Goal OT LTG, Date Established 02/03/18   LB Dressing Goal OT LTG, Time to Achieve by discharge   LB Dressing Goal OT LTG, [place] Level moderate assist (50% patient effort)   LB Dressing Goal OT LTG, Adaptive Equipment reacher;sock-aid;dressing stick

## 2018-02-04 LAB
ANION GAP SERPL CALCULATED.3IONS-SCNC: 6 MMOL/L (ref 4–13)
BUN BLD-MCNC: 27 MG/DL (ref 5–21)
BUN/CREAT SERPL: 27 (ref 7–25)
CALCIUM SPEC-SCNC: 7.4 MG/DL (ref 8.4–10.4)
CHLORIDE SERPL-SCNC: 104 MMOL/L (ref 98–110)
CO2 SERPL-SCNC: 26 MMOL/L (ref 24–31)
CREAT BLD-MCNC: 1 MG/DL (ref 0.5–1.4)
DEPRECATED RDW RBC AUTO: 48 FL (ref 40–54)
ERYTHROCYTE [DISTWIDTH] IN BLOOD BY AUTOMATED COUNT: 14.1 % (ref 12–15)
GFR SERPL CREATININE-BSD FRML MDRD: 52 ML/MIN/1.73
GLUCOSE BLD-MCNC: 136 MG/DL (ref 70–100)
HCT VFR BLD AUTO: 26 % (ref 37–47)
HGB BLD-MCNC: 8.7 G/DL (ref 12–16)
MCH RBC QN AUTO: 31.6 PG (ref 28–32)
MCHC RBC AUTO-ENTMCNC: 33.5 G/DL (ref 33–36)
MCV RBC AUTO: 94.5 FL (ref 82–98)
PLATELET # BLD AUTO: 352 10*3/MM3 (ref 130–400)
PMV BLD AUTO: 9.3 FL (ref 6–12)
POTASSIUM BLD-SCNC: 3.3 MMOL/L (ref 3.5–5.3)
RBC # BLD AUTO: 2.75 10*6/MM3 (ref 4.2–5.4)
SODIUM BLD-SCNC: 136 MMOL/L (ref 135–145)
WBC NRBC COR # BLD: 10.85 10*3/MM3 (ref 4.8–10.8)

## 2018-02-04 PROCEDURE — 80048 BASIC METABOLIC PNL TOTAL CA: CPT | Performed by: INTERNAL MEDICINE

## 2018-02-04 PROCEDURE — 97530 THERAPEUTIC ACTIVITIES: CPT

## 2018-02-04 PROCEDURE — 25010000002 FUROSEMIDE PER 20 MG: Performed by: INTERNAL MEDICINE

## 2018-02-04 PROCEDURE — 85027 COMPLETE CBC AUTOMATED: CPT | Performed by: INTERNAL MEDICINE

## 2018-02-04 PROCEDURE — 25010000002 CEFTRIAXONE PER 250 MG: Performed by: INTERNAL MEDICINE

## 2018-02-04 PROCEDURE — 25010000002 ENOXAPARIN PER 10 MG: Performed by: ORTHOPAEDIC SURGERY

## 2018-02-04 RX ORDER — FUROSEMIDE 10 MG/ML
20 INJECTION INTRAMUSCULAR; INTRAVENOUS DAILY
Status: DISCONTINUED | OUTPATIENT
Start: 2018-02-04 | End: 2018-02-07 | Stop reason: HOSPADM

## 2018-02-04 RX ADMIN — HYDROCODONE BITARTRATE AND ACETAMINOPHEN 1 TABLET: 5; 325 TABLET ORAL at 08:42

## 2018-02-04 RX ADMIN — TOBRAMYCIN 1 DROP: 3 SOLUTION OPHTHALMIC at 09:46

## 2018-02-04 RX ADMIN — HYDROCODONE BITARTRATE AND ACETAMINOPHEN 1 TABLET: 5; 325 TABLET ORAL at 03:40

## 2018-02-04 RX ADMIN — HYDROCODONE BITARTRATE AND ACETAMINOPHEN 1 TABLET: 5; 325 TABLET ORAL at 17:03

## 2018-02-04 RX ADMIN — SODIUM CHLORIDE 100 ML/HR: 9 INJECTION, SOLUTION INTRAVENOUS at 05:29

## 2018-02-04 RX ADMIN — LEVOTHYROXINE SODIUM 100 MCG: 100 TABLET ORAL at 06:20

## 2018-02-04 RX ADMIN — TOBRAMYCIN 1 DROP: 3 SOLUTION OPHTHALMIC at 15:07

## 2018-02-04 RX ADMIN — DORZOLAMIDE HYDROCHLORIDE 1 DROP: 20 SOLUTION/ DROPS OPHTHALMIC at 22:17

## 2018-02-04 RX ADMIN — CEFTRIAXONE SODIUM 1 G: 1 INJECTION, POWDER, FOR SOLUTION INTRAMUSCULAR; INTRAVENOUS at 18:20

## 2018-02-04 RX ADMIN — DORZOLAMIDE HYDROCHLORIDE 1 DROP: 20 SOLUTION/ DROPS OPHTHALMIC at 15:06

## 2018-02-04 RX ADMIN — FERROUS SULFATE TAB 325 MG (65 MG ELEMENTAL FE) 325 MG: 325 (65 FE) TAB at 09:46

## 2018-02-04 RX ADMIN — FERROUS SULFATE TAB 325 MG (65 MG ELEMENTAL FE) 325 MG: 325 (65 FE) TAB at 17:04

## 2018-02-04 RX ADMIN — BISOPROLOL FUMARATE: 5 TABLET ORAL at 09:46

## 2018-02-04 RX ADMIN — FUROSEMIDE 20 MG: 10 INJECTION, SOLUTION INTRAVENOUS at 15:07

## 2018-02-04 RX ADMIN — ENOXAPARIN SODIUM 30 MG: 30 INJECTION SUBCUTANEOUS at 09:46

## 2018-02-04 RX ADMIN — DORZOLAMIDE HYDROCHLORIDE 1 DROP: 20 SOLUTION/ DROPS OPHTHALMIC at 09:46

## 2018-02-04 RX ADMIN — HYDROCODONE BITARTRATE AND ACETAMINOPHEN 1 TABLET: 5; 325 TABLET ORAL at 22:20

## 2018-02-04 NOTE — PROGRESS NOTES
[facility]  BARBI Mckay M.D.  AMARIS Emmanuel      Internal Medicine Progress Note    2/4/2018   2:34 PM    Name:  Andres Walter  MRN:    [id number]     Acct:     [id number]   Room:  [address] [place] [address] Day: 3     Admit Date: 2/1/2018  2:22 PM  PCP: No Known Provider    Subjective:     C/C:   Chief Complaint   Patient presents with   • Fall   • Hip Pain       Interval History: Status:  Improved but still having foot pain. xrays were normal.    Review of Systems   Constitution: Positive for weakness. Negative for chills, fever, malaise/fatigue and weight loss.   HENT: Negative for congestion, hoarse voice and nosebleeds.    Eyes: Negative for blurred vision and photophobia.   Cardiovascular: Positive for leg swelling. Negative for chest pain, dyspnea on exertion, near-syncope and orthopnea.   Respiratory: Negative for cough, shortness of breath and sputum production.    Endocrine: Negative for cold intolerance and polyuria.   Hematologic/Lymphatic: Negative for adenopathy and bleeding problem.   Skin: Negative for dry skin, itching and rash.   Musculoskeletal: Positive for joint swelling and myalgias. Negative for arthritis, back pain and joint pain.   Gastrointestinal: Negative for abdominal pain, diarrhea, heartburn, nausea and vomiting.   Genitourinary: Negative for dysuria and flank pain.   Neurological: Negative for dizziness, headaches, paresthesias and seizures.   Psychiatric/Behavioral: Negative for altered mental status and memory loss. The patient is not nervous/anxious.    Allergic/Immunologic: Negative for environmental allergies and hives.         Medications:     Allergies: No Known Allergies    Current Meds:   Current Facility-Administered Medications:   •  bisoprolol (ZEBeta) 5 mg, hydrochlorothiazide (HYDRODIURIL) 6.25 mg for Ziac 5-6.25, , Oral, Q24H, Som Roach MD  •  cefTRIAXone (ROCEPHIN) 1 g/10mL IV PUSH syringe, 1 g, Intravenous, Q24H, Som Hilario  MD Doc, 1 g at 02/03/18 1804  •  dorzolamide (TRUSOPT) 2 % ophthalmic solution 1 drop, 1 drop, Both Eyes, TID, Som Roach MD, 1 drop at 02/04/18 0946  •  enoxaparin (LOVENOX) syringe 30 mg, 30 mg, Subcutaneous, Q24H, Emory Barger MD, 30 mg at 02/04/18 0946  •  ferrous sulfate tablet 325 mg, 325 mg, Oral, BID With Meals, Melisa Motley, AMARIS, 325 mg at 02/04/18 0946  •  HYDROcodone-acetaminophen (NORCO)  MG per tablet 1 tablet, 1 tablet, Oral, Q4H PRN, Emory Barger MD  •  HYDROcodone-acetaminophen (NORCO) 5-325 MG per tablet 1 tablet, 1 tablet, Oral, Q4H PRN, Emory Barger MD, 1 tablet at 02/04/18 0842  •  Influenza Vac Subunit Quad (FLUCELVAX) injection 0.5 mL, 0.5 mL, Intramuscular, During Hospitalization, Som Roach MD  •  lactated ringers infusion, 9 mL/hr, Intravenous, Continuous, Krishna Moon MD, Stopped at 02/02/18 1803  •  levothyroxine (SYNTHROID, LEVOTHROID) tablet 100 mcg, 100 mcg, Oral, Q AM, Som Roach MD, 100 mcg at 02/04/18 0620  •  Morphine sulfate (PF) injection 1 mg, 1 mg, Intravenous, Q4H PRN **AND** naloxone (NARCAN) injection 0.4 mg, 0.4 mg, Intravenous, Q5 Min PRN, Emory Barger MD  •  Morphine sulfate (PF) injection 4 mg, 4 mg, Intravenous, Q4H PRN **AND** naloxone (NARCAN) injection 0.4 mg, 0.4 mg, Intravenous, Q5 Min PRN, Som Roach MD  •  ondansetron (ZOFRAN) injection 4 mg, 4 mg, Intravenous, Q6H PRN, Som Roach MD  •  ondansetron (ZOFRAN) injection 4 mg, 4 mg, Intravenous, Q6H PRN, Emory Barger MD  •  ondansetron (ZOFRAN) tablet 4 mg, 4 mg, Oral, Q6H PRN, Emory Barger MD  •  pneumococcal polysaccharide 23-valent (PNEUMOVAX-23) vaccine 0.5 mL, 0.5 mL, Intramuscular, During Hospitalization, Som Roach MD  •  sodium chloride 0.9 % flush 1-10 mL, 1-10 mL, Intravenous, PRN, Som Roach MD  •  Insert peripheral IV, , , Once **AND** sodium chloride 0.9 % flush 10 mL, 10 mL, Intravenous, PRN, Gabriela RODRIGUEZ  "MD Nena  •  sodium chloride 0.9 % infusion, 100 mL/hr, Intravenous, Continuous, Emory Barger MD, Last Rate: 100 mL/hr at 18, 100 mL/hr at 18 05  •  tobramycin 0.3 % ophthalmic solution 1 drop, 1 drop, Left Eye, TID, Som Roach MD, 1 drop at 18 0946    Data:     Code Status:  Full Code    History reviewed. No pertinent family history.    Social History     Social History   • Marital status:      Spouse name: N/A   • Number of children: N/A   • Years of education: N/A     Occupational History   • Not on file.     Social History Main Topics   • Smoking status: Never Smoker   • Smokeless tobacco: Never Used   • Alcohol use No   • Drug use: No   • Sexual activity: Defer     Other Topics Concern   • Not on file     Social History Narrative       Vitals:  BP (!) 108/37 (BP Location: Right arm, Patient Position: Lying) Comment: rn notified  Pulse 74  Temp 99.1 °F (37.3 °C) (Oral)   Resp 16  Ht 157.5 cm (62\")  Wt 45.4 kg (100 lb)  SpO2 98%  BMI 18.29 kg/m2  Temp (24hrs), Av.6 °F (37 °C), Min:97.7 °F (36.5 °C), Max:99.7 °F (37.6 °C)            I/O (24Hr):    Intake/Output Summary (Last 24 hours) at 18 1434  Last data filed at 18 0842   Gross per 24 hour   Intake             2100 ml   Output              450 ml   Net             1650 ml       Labs and imaging:      Lab Results (last 24 hours)     Procedure Component Value Units Date/Time    Blood Culture - Blood, [[id number]]  (Normal) Collected:  18 1708    Specimen:  Blood from Arm, Right Updated:  18 173     Blood Culture No growth at 2 days    Blood Culture - Blood, [[id number]]  (Normal) Collected:  18 1713    Specimen:  Blood from Arm, Left Updated:  18 173     Blood Culture No growth at 2 days    CBC (No Diff) [[id number]]  (Abnormal) Collected:  18 0452    Specimen:  Blood Updated:  18 0516     WBC 10.85 (H) 10*3/mm3      RBC 2.75 (L) 10*6/mm3      Hemoglobin 8.7 (L) " g/dL      Hematocrit 26.0 (L) %      MCV 94.5 fL      MCH 31.6 pg      MCHC 33.5 g/dL      RDW 14.1 %      RDW-SD 48.0 fl      MPV 9.3 fL      Platelets 352 10*3/mm3     Basic Metabolic Panel [[id number]]  (Abnormal) Collected:  02/04/18 0452    Specimen:  Blood Updated:  02/04/18 0527     Glucose 136 (H) mg/dL      BUN 27 (H) mg/dL      Creatinine 1.00 mg/dL      Sodium 136 mmol/L      Potassium 3.3 (L) mmol/L      Chloride 104 mmol/L      CO2 26.0 mmol/L      Calcium 7.4 (L) mg/dL      eGFR Non African Amer 52 (L) mL/min/1.73      BUN/Creatinine Ratio 27.0 (H)     Anion Gap 6.0 mmol/L     Narrative:       The MDRD GFR formula is only valid for adults with stable renal function between ages 18 and 70.            Xr Ankle 2 View Left    Result Date: 2/3/2018  Narrative: EXAMINATION: XR ANKLE 2 VW LEFT- 2/3/2018 2:46 PM CST  HISTORY: Pain  Comparison: None  Findings: There is normal bony alignment at the ankle without evidence of acute fracture or dislocation. There is somewhat diffuse soft tissue edema and skin thickening. No lytic or sclerotic lesions are appreciated. Vascular calcifications are present.      Impression: Impression: No acute bony abnormality. This report was finalized on 02/03/2018 14:47 by Dr. Cody Stanley MD.    Xr Ankle 2 View Right    Result Date: 2/3/2018  Narrative: EXAMINATION: XR ANKLE 2 VW RIGHT- 2/3/2018 2:54 PM CST  HISTORY: Pain  Comparison: None  Findings: There is normal bony alignment at the ankle without evidence of acute fracture or dislocation. The ankle mortise appears intact. No lytic or sclerotic lesions are appreciated. Vascular calcifications are noted. There appears to be diffuse soft tissue edema.      Impression: Impression: No acute bony abnormality. This report was finalized on 02/03/2018 14:55 by Dr. Cody Stanley MD.    Xr Foot 2 View Left    Result Date: 2/3/2018  Narrative: EXAMINATION: XR FOOT 2 VW LEFT- 2/3/2018 2:49 PM CST  HISTORY: Pain  Comparison: None   Findings: A hallux valgus deformity is present. Degenerative changes are seen at the first metatarsophalangeal joint. Degenerative changes are seen throughout the proximal interphalangeal joints of the toes. No acute fracture is appreciated. When allowing for limitations of nonweight bearing images, the Lisfranc joint appears grossly normal. An oblique view of the foot is not submitted. Degenerative plantar calcaneal spurring is noted. Vascular calcifications are present.      Impression: Impression: Hallux valgus deformity with degenerative changes of the foot but no appreciable acute bony abnormality. This report was finalized on 02/03/2018 14:51 by Dr. Cody Stanley MD.    Xr Foot 2 View Right    Result Date: 2/3/2018  Narrative: EXAMINATION: XR FOOT 2 VW RIGHT- 2/3/2018 2:56 PM CST  HISTORY: Pain  Comparison: None  Findings: Hallux valgus deformity is noted, with minimal lateral subluxation of the proximal phalanx of the great toe relative to the first metatarsal. Extensive subchondral cystic changes are noted of the distal first metatarsal. Degenerative changes are seen at the first metatarsophalangeal joint. Allowing for limitations of nonweight bearing images, the Lisfranc joint appears grossly normal. Degenerative changes are seen at the proximal interphalangeal joints throughout the toes. The base of the fifth metatarsal appears intact. An oblique view is not submitted for review. Vascular calcifications are present. There appears to be diffuse skin thickening and/or edema.      Impression: Impression: Degenerative changes of the foot without appreciable acute bony abnormality. This report was finalized on 02/03/2018 14:58 by Dr. Cody Stanley MD.    Ct Head Without Contrast    Result Date: 2/1/2018  Narrative: CT HEAD WO CONTRAST- 2/1/2018 4:24 PM EST  HISTORY: fall ; altered mental status  COMPARISON: 12/28/2014  DOSE LENGTH PRODUCT: 1042 mGy cm. Automated exposure control was also utilized to decrease  patient radiation dose.  TECHNIQUE: Axial images of brain obtained without IV contrast.  FINDINGS:  There is cortical atrophy, greatest within the frontal region. No intracranial hemorrhage or mass effect is identified. No acute signs of ischemia. There is no extra-axial hematoma or subarachnoid hemorrhage.  There is near complete opacification of the bilateral sphenoid sinuses. Sphenoid opacification seen on the 2014 exam. There is mild mucosal thickening of the ethmoid sinuses. The mastoid air cells are well-aerated. The bony calvarium appears intact.      Impression: 1. Mild to moderate cortical atrophy, greatest within the frontal region. No acute intracranial abnormality. 2. Persistent chronic sphenoid opacification, chronic sinusitis. This report was finalized on 02/01/2018 15:44 by Dr. Shasta Crawford MD.    Xr Chest 1 View    Result Date: 2/1/2018  Narrative: EXAMINATION: XR CHEST 1 VW- 2/1/2018 16:12 CST  HISTORY: Fall, pain  Comparison: 12/20/2014  Findings: Heart and mediastinal contours appear normal. Increased interstitial markings are seen bilaterally with scattered patchy opacities. There is biapical pleural thickening. No pneumothorax is appreciated. There is no appreciable pleural effusion. The pulmonary vasculature appears normal.      Impression: Impression: Patchy opacities bilaterally are concerning for an infectious or inflammatory process. This report was finalized on 02/01/2018 16:13 by Dr. Cody Stanley MD.    Xr Hip With Or Without Pelvis 2 - 3 View Left    Result Date: 2/1/2018  Narrative: HISTORY: Fall with pain  Left hip: Frontal view the pelvis performed with frontal and cross table lateral views left hip.  There is a left subcapital femoral neck fracture with prominent impaction and superior displacement of the femoral neck in reference to the femoral head. No additional fracture is identified. Coarse pelvic calcification may represent a uterine leiomyoma. There are vascular  calcifications.      Impression: 1. Impacted left subcapital femoral neck fracture. This report was finalized on 02/01/2018 16:13 by Dr. Shasta Crawford MD.                        Physical Examination:        Physical Exam   Constitutional: She is oriented to person, place, and time. She appears well-developed and well-nourished.   HENT:   Head: Normocephalic and atraumatic.   Mouth/Throat: Oropharynx is clear and moist.   Eyes: Conjunctivae and EOM are normal. Pupils are equal, round, and reactive to light.   Neck: Normal range of motion. Neck supple.   Cardiovascular: Normal rate, regular rhythm, normal heart sounds and intact distal pulses.    Pulmonary/Chest: Effort normal and breath sounds normal.   Abdominal: Soft. Bowel sounds are normal. She exhibits no distension.   Musculoskeletal: Normal range of motion. She exhibits edema (2+ BLE) and tenderness (pain with rom of bilateral lower extremeties).   Neurological: She is alert and oriented to person, place, and time. No cranial nerve deficit.   Skin: Skin is warm and dry.   Psychiatric: She has a normal mood and affect. Her behavior is normal. Thought content normal.         Assessment:        Primary Problem  <principal problem not specified>     Active Problems:    Fall    Past Medical History:   Diagnosis Date   • Arthritis    • Encephalopathy    • Hypertension         Plan:        1. Impacted left subcapital femur fracture s/p fall  2. UTI - e. Coli - present on admission  3. HTN  4. Encephalopathy  5. Iron deficiency anemia  6. Hypothyroidism  7. HLD    Continue prn pain meds and therapy. Initially I thought the patient may have been a little dry but now despite IV fluids her renal output is low and swelling is increasing. I will dc iv fluids and try a dose of lasix but will contiue to monitor renal function closely.      Electronically signed by Som Roach MD on 2/4/2018 at 2:34 PM

## 2018-02-04 NOTE — PLAN OF CARE
Problem: Patient Care Overview (Adult)  Goal: Plan of Care Review  Outcome: Ongoing (interventions implemented as appropriate)   02/04/18 1033   Coping/Psychosocial Response Interventions   Plan Of Care Reviewed With patient   Patient Care Overview   Progress progress toward functional goals is gradual   Outcome Evaluation   Outcome Summary/Follow up Plan Pt very anxious, didn't want to work w/ PT needed lots of encouragement. Bed mobility supine-sit max 2 using draw sheet. Once sitting pt immediatellyy wanted to lay bk down. encouraged movement, pt wasn't happy but followed through. She was able to stand mod x2 and take few sidesteps towards HOB. Reviewed THR ex's but pt refused to do any of them. Especially went over APs due to increased swelling and Plantarflexion of Both feet. Will cont to work w/ and enccourage Rx. Pt will benefit from SNF upon d/c

## 2018-02-04 NOTE — PROGRESS NOTES
Continued Stay Note  JAMAICA Mcmahan     Patient Name: Andres Walter  MRN: [id number]  Today's Date: 2/4/2018    Admit Date: 2/1/2018          Discharge Plan       02/04/18 3359    Case Management/Social Work Plan    Plan Spoke to pt's son about placement.  His first choice is [facility] and then [facility].  SW has faxed to both facilities and will follow for bed offer.  RAMSES Deras.    Patient/Family In Agreement With Plan yes              Discharge Codes     None            RAMSES Da Silva

## 2018-02-04 NOTE — PLAN OF CARE
"Problem: Fall Risk (Adult)  Goal: Absence of Falls  Outcome: Ongoing (interventions implemented as appropriate)      Problem: Pressure Ulcer Risk (To Scale) (Adult,Obstetrics,Pediatric)  Goal: Skin Integrity  Outcome: Ongoing (interventions implemented as appropriate)      Problem: Patient Care Overview (Adult)  Goal: Plan of Care Review  Outcome: Ongoing (interventions implemented as appropriate)   02/04/18 8474   Coping/Psychosocial Response Interventions   Plan Of Care Reviewed With patient   Patient Care Overview   Progress no change   Outcome Evaluation   Outcome Summary/Follow up Plan PT confused, complains whenever she is touched. 4+ pitting edema in R leg, 3+ in L leg. PT CO pain in R leg and foot. FC from surgery removed, no orders to keep to monitor urine output. Urine output low, DRs notified during rounds, no new orders, cont to monitor. PT CO her stomach feeling \"spongy.\" Last BM 2/1. Turn pt Q2, no new skin breakdown. Cream applied to buttock PRN. No CO pain in hip. Free from falls.        Problem: Hip Replacement, Total (Adult)  Goal: Signs and Symptoms of Listed Potential Problems Will be Absent or Manageable (Hip Replacement, Total)  Outcome: Ongoing (interventions implemented as appropriate)      Problem: Fluid Volume Deficit (Adult)  Goal: Identify Related Risk Factors and Signs and Symptoms  Outcome: Outcome(s) achieved Date Met: 02/04/18    Goal: Fluid/Electrolyte Balance  Outcome: Ongoing (interventions implemented as appropriate)    Goal: Comfort/Well Being  Outcome: Ongoing (interventions implemented as appropriate)        "

## 2018-02-04 NOTE — SIGNIFICANT NOTE
Attempted to see pt for diet tolerance, however she refused PO trials. Spoke with nursing who reported the pt was given meds in applesauce, which she tolerate well. Continue current diet of regular solids and thin liquids and avoid straws with liquid intake.     02/04/18 1042   Time Calculation- SLP   SLP Start Time 1034   SLP Stop Time 1038   SLP Time Calculation (min) 4 min   SLP Non-Billable Time (min) 4 min   SLP Received On 02/04/18   Vamsi Jacinto CCC-SLP 2/4/2018 10:43 AM    
Statement Selected

## 2018-02-04 NOTE — PLAN OF CARE
Problem: Fall Risk (Adult)  Goal: Absence of Falls  Outcome: Ongoing (interventions implemented as appropriate)      Problem: Pressure Ulcer Risk (To Scale) (Adult,Obstetrics,Pediatric)  Goal: Skin Integrity  Outcome: Ongoing (interventions implemented as appropriate)      Problem: Patient Care Overview (Adult)  Goal: Plan of Care Review  Outcome: Ongoing (interventions implemented as appropriate)   02/04/18 5356   Coping/Psychosocial Response Interventions   Plan Of Care Reviewed With patient   Patient Care Overview   Progress no change   Outcome Evaluation   Outcome Summary/Follow up Plan No neuro changes. PRN pain medication given upon request. Safety maintained. Bed check. In and out cath once, see charting. DTV. Mepilex to left hip changed CDI. Will continue to monitor.      Goal: Adult Individualization and Mutuality  Outcome: Ongoing (interventions implemented as appropriate)    Goal: Discharge Needs Assessment  Outcome: Ongoing (interventions implemented as appropriate)      Problem: Hip Replacement, Total (Adult)  Goal: Signs and Symptoms of Listed Potential Problems Will be Absent or Manageable (Hip Replacement, Total)  Outcome: Ongoing (interventions implemented as appropriate)      Problem: Fluid Volume Deficit (Adult)  Goal: Fluid/Electrolyte Balance  Outcome: Ongoing (interventions implemented as appropriate)    Goal: Comfort/Well Being  Outcome: Ongoing (interventions implemented as appropriate)

## 2018-02-04 NOTE — DISCHARGE PLACEMENT REQUEST
"From:  Huong Arya, [facility]  [phone]    Pt's son is requesting pt get a private room if possible.  Thanks.     Andres Walter (88 y.o. Female)     Date of Birth Social Security Number Address Home Phone MRN    09/18/1929  [address] [id number] [phone] [id number]    Druze Marital Status          Unknown        Admission Date Admission Type Admitting Provider Attending Provider Department, Room/Bed    2/1/18 Emergency Som Roach MD Wilson, Richard Scott, MD [address], 341/1    Discharge Date Discharge Disposition Discharge Destination                      Attending Provider: Som Roach MD     Allergies:  No Known Allergies    Isolation:  None   Infection:  None   Code Status:  FULL    Ht:  157.5 cm (62\")   Wt:  45.4 kg (100 lb)    Admission Cmt:  None   Principal Problem:  None                Active Insurance as of 2/1/2018     Primary Coverage     Payor Plan Insurance Group Employer/Plan Group    MEDICARE MEDICARE A & B      Payor Plan Address Payor Plan Phone Number Effective From Effective To    [address] [phone] 9/1/1993     [place] [address]       Subscriber Name Subscriber Birth Date Member ID       ANDRES WALTER 9/18/1929 [id number]                 Emergency Contacts      (Rel.) Home Phone Work Phone Mobile Phone    Contacts,No (Other) -- -- [phone]    Tirso Walter (Son) -- -- [phone]               History & Physical      AMARIS Rodriguez at 2/2/2018 11:00 AM            [facility]  BARBI Mckay M.D. Shauna Yadloski, APRN        Internal Medicine History and Physical      Name: Andres Walter  MRN: [id number]     Acct: [id number]  Room: [facility]/    Admit Date: 2/1/2018  PCP: No Known Provider    Chief Complaint:     Chief Complaint   Patient presents with   • Fall   • Hip Pain       History Obtained From:     chart review and unobtainable from patient due " to mental status    History of Present Illness:      Andres Walter is a  88 y.o.  female who presents with   Chief Complaint   Patient presents with   • Fall   • Hip Pain   The patient has been in declining health recently. Our office has had multiple phone calls from neighbors, family members and  stating that patient's self care abilities have declined and that she has become increasingly confused and paranoid. The patient had refused medical treatment and had declined hospital admission. She was found yesterday at home after having suffered an unwitnessed fall at an undetermined time. The circumstances surrounding the fall are unknown. The patient was noted to have decreased range of motion and an obvious deformity of the left hip. She was advised to present to [facility] for evaluation and treatment so that psychiatric evaluation could be completed upon repair of the fracture. The patient, however, presented to [facility]. She was found to have an impacted left subcapital femoral neck fracture as well as a urinary tract infection. She has been admitted to our service for further evaluation and treatment. She's been evaluated by orthopedics with plans for surgical repair of the fracture later today.     Past Medical History:     Past Medical History:   Diagnosis Date   • Arthritis    • Hypertension         Past Surgical History:     History reviewed. No pertinent surgical history.     Medications Prior to Admission:       Prior to Admission medications    Medication Sig Start Date End Date Taking? Authorizing Provider   atorvastatin (LIPITOR) 20 MG tablet Take 20 mg by mouth Daily.   Yes Historical Provider, MD   bisoprolol-hydrochlorothiazide (ZIAC) 5-6.25 MG per tablet Take 1 tablet by mouth Daily.   Yes Historical Provider, MD   levothyroxine (SYNTHROID, LEVOTHROID) 100 MCG tablet Take 100 mcg by mouth Daily.   Yes Historical Provider, MD        Allergies:        "Review of patient's allergies indicates no known allergies.    Social History:     Tobacco:    reports that she has never smoked. She has never used smokeless tobacco.  Alcohol:      reports that she does not drink alcohol.  Drug Use:  reports that she does not use illicit drugs.    Family History:     History reviewed. No pertinent family history.    Review of Systems:     Review of Systems   Unable to perform ROS: mental status change       Code Status:  Full Code    Physical Exam:     Vitals:  /51 (BP Location: Left arm, Patient Position: Lying)  Pulse 72  Temp 98.5 °F (36.9 °C) (Oral)   Resp 20  Ht 157.5 cm (62\")  Wt 45.4 kg (100 lb)  SpO2 96%  BMI 18.29 kg/m2  Temp (24hrs), Av.5 °F (36.9 °C), Min:97.4 °F (36.3 °C), Max:99.1 °F (37.3 °C)    Physical Exam   Constitutional: She appears lethargic. She appears cachectic.   HENT:   Head: Normocephalic and atraumatic.   Nose: Nose normal.   Mouth/Throat: Oropharynx is clear and moist.   Eyes: Conjunctivae and EOM are normal. Pupils are equal, round, and reactive to light.   Neck: Normal range of motion. Neck supple.   Cardiovascular: Normal rate, regular rhythm, normal heart sounds and intact distal pulses.    Pulmonary/Chest: Effort normal and breath sounds normal.   Abdominal: Soft. Bowel sounds are normal.   Musculoskeletal:   Generalized weakness  Decreased rom left hip   Neurological: She appears lethargic.   Drowsy, but arousable  Oriented to person only   Skin: Skin is warm and dry.   Psychiatric: She has a normal mood and affect. Her behavior is normal.   Nursing note and vitals reviewed.    Data:     Lab Results (last 7 days)     Procedure Component Value Units Date/Time    CBC & Differential [[id number]] Collected:  18    Specimen:  Blood Updated:  18    Narrative:       The following orders were created for panel order CBC & Differential.  Procedure                               Abnormality         Status                 "     ---------                               -----------         ------                     CBC Auto Differential[[id number]]         Abnormal            Final result                 Please view results for these tests on the individual orders.    CBC Auto Differential [[id number]]  (Abnormal) Collected:  02/01/18 1510    Specimen:  Blood Updated:  02/01/18 1519     WBC 15.52 (H) 10*3/mm3      RBC 3.86 (L) 10*6/mm3      Hemoglobin 11.9 (L) g/dL      Hematocrit 36.8 (L) %      MCV 95.3 fL      MCH 30.8 pg      MCHC 32.3 (L) g/dL      RDW 14.2 %      RDW-SD 48.9 fl      MPV 9.2 fL      Platelets 460 (H) 10*3/mm3      Neutrophil % 87.6 (H) %      Lymphocyte % 6.9 (L) %      Monocyte % 5.1 %      Eosinophil % 0.0 %      Basophil % 0.1 %      Immature Grans % 0.3 %      Neutrophils, Absolute 13.59 (H) 10*3/mm3      Lymphocytes, Absolute 1.07 10*3/mm3      Monocytes, Absolute 0.79 10*3/mm3      Eosinophils, Absolute 0.00 10*3/mm3      Basophils, Absolute 0.02 10*3/mm3      Immature Grans, Absolute 0.05 (H) 10*3/mm3      nRBC 0.0 /100 WBC     Protime-INR [[id number]]  (Normal) Collected:  02/01/18 1510    Specimen:  Blood Updated:  02/01/18 1528     Protime 14.1 Seconds      INR 1.06    aPTT [[id number]]  (Normal) Collected:  02/01/18 1510    Specimen:  Blood Updated:  02/01/18 1528     PTT 32.0 seconds     Comprehensive Metabolic Panel [[id number]]  (Abnormal) Collected:  02/01/18 1510    Specimen:  Blood Updated:  02/01/18 1531     Glucose 117 (H) mg/dL      BUN 34 (H) mg/dL      Creatinine 0.85 mg/dL      Sodium 139 mmol/L      Potassium 4.4 mmol/L      Chloride 97 (L) mmol/L      CO2 27.0 mmol/L      Calcium 8.7 mg/dL      Total Protein 7.6 g/dL      Albumin 3.70 g/dL      ALT (SGPT) 36 U/L      AST (SGOT) 64 (H) U/L      Alkaline Phosphatase 94 U/L      Total Bilirubin 0.9 mg/dL      eGFR Non African Amer 63 mL/min/1.73      Globulin 3.9 gm/dL      A/G Ratio 0.9 (L) g/dL      BUN/Creatinine Ratio 40.0 (H)     Anion Gap 15.0 (H)  mmol/L     Narrative:       The MDRD GFR formula is only valid for adults with stable renal function between ages 18 and 70.    CK [[id number]]  (Abnormal) Collected:  02/01/18 1510    Specimen:  Blood Updated:  02/01/18 1531     Creatine Kinase 746 (H) U/L     Urinalysis With / Culture If Indicated - Urine, Catheter [[id number]]  (Abnormal) Collected:  02/01/18 1656    Specimen:  Urine from Urine, Catheter Updated:  02/01/18 1706     Color, UA Yellow     Appearance, UA Clear     pH, UA 5.5     Specific Gravity, UA 1.024     Glucose, UA Negative     Ketones, UA 15 mg/dL (1+) (A)     Bilirubin, UA Negative     Blood, UA Small (1+) (A)     Protein, UA 30 mg/dL (1+) (A)     Leuk Esterase, UA Trace (A)     Nitrite, UA Positive (A)     Urobilinogen, UA 0.2 E.U./dL    Urinalysis, Microscopic Only - Urine, Clean Catch [[id number]]  (Abnormal) Collected:  02/01/18 1656    Specimen:  Urine from Urine, Catheter Updated:  02/01/18 1706     RBC, UA 0-2 (A) /HPF      WBC, UA 13-20 (A) /HPF      Bacteria, UA 4+ (A) /HPF      Squamous Epithelial Cells, UA 3-6 (A) /HPF      Hyaline Casts, UA 3-6 /LPF      Methodology Automated Microscopy    Blood Culture - Blood, [[id number]]  (Normal) Collected:  02/01/18 1708    Specimen:  Blood from Arm, Right Updated:  02/02/18 0531     Blood Culture No growth at less than 24 hours    Blood Culture - Blood, [[id number]]  (Normal) Collected:  02/01/18 1713    Specimen:  Blood from Arm, Left Updated:  02/02/18 0531     Blood Culture No growth at less than 24 hours    Urine Culture - Urine, Urine, Clean Catch [[id number]]  (Abnormal) Collected:  02/01/18 1656    Specimen:  Urine from Urine, Catheter Updated:  02/02/18 0614     Urine Culture --      >100,000 CFU/mL Escherichia coli (A)    CBC (No Diff) [[id number]]  (Abnormal) Collected:  02/02/18 0605    Specimen:  Blood Updated:  02/02/18 0627     WBC 13.94 (H) 10*3/mm3      RBC 3.57 (L) 10*6/mm3      Hemoglobin 11.1 (L) g/dL      Hematocrit 34.4 (L) %       MCV 96.4 fL      MCH 31.1 pg      MCHC 32.3 (L) g/dL      RDW 14.0 %      RDW-SD 49.8 fl      MPV 9.3 fL      Platelets 443 (H) 10*3/mm3     Basic Metabolic Panel [[id number]]  (Abnormal) Collected:  02/02/18 0605    Specimen:  Blood Updated:  02/02/18 0639     Glucose 80 mg/dL      BUN 32 (H) mg/dL      Creatinine 1.00 mg/dL      Sodium 140 mmol/L      Potassium 3.9 mmol/L      Chloride 99 mmol/L      CO2 28.0 mmol/L      Calcium 8.7 mg/dL      eGFR Non African Amer 52 (L) mL/min/1.73      BUN/Creatinine Ratio 32.0 (H)     Anion Gap 13.0 mmol/L     Narrative:       The MDRD GFR formula is only valid for adults with stable renal function between ages 18 and 70.    Iron Profile [[id number]]  (Abnormal) Collected:  02/02/18 0605    Specimen:  Blood Updated:  02/02/18 0644     Iron 28 (L) mcg/dL      TIBC 261 mcg/dL      Iron Saturation 11 (L) %     Vitamin D 25 Hydroxy [[id number]]  (Abnormal) Collected:  02/02/18 0605    Specimen:  Blood Updated:  02/02/18 0654     25 Hydroxy, Vitamin D 14.5 (L) ng/ml     Folate [[id number]] Collected:  02/02/18 0605    Specimen:  Blood Updated:  02/02/18 0743     Folate >20.00 ng/mL     Vitamin B12 [[id number]]  (Normal) Collected:  02/02/18 0605    Specimen:  Blood Updated:  02/02/18 0743     Vitamin B-12 830 pg/mL         Ct Head Without Contrast    Result Date: 2/1/2018  Narrative: CT HEAD WO CONTRAST- 2/1/2018 4:24 PM EST  HISTORY: fall ; altered mental status  COMPARISON: 12/28/2014  DOSE LENGTH PRODUCT: 1042 mGy cm. Automated exposure control was also utilized to decrease patient radiation dose.  TECHNIQUE: Axial images of brain obtained without IV contrast.  FINDINGS:  There is cortical atrophy, greatest within the frontal region. No intracranial hemorrhage or mass effect is identified. No acute signs of ischemia. There is no extra-axial hematoma or subarachnoid hemorrhage.  There is near complete opacification of the bilateral sphenoid sinuses. Sphenoid opacification seen on the  2014 exam. There is mild mucosal thickening of the ethmoid sinuses. The mastoid air cells are well-aerated. The bony calvarium appears intact.      Impression: 1. Mild to moderate cortical atrophy, greatest within the frontal region. No acute intracranial abnormality. 2. Persistent chronic sphenoid opacification, chronic sinusitis. This report was finalized on 02/01/2018 15:44 by Dr. Shasta Crawford MD.    Xr Chest 1 View    Result Date: 2/1/2018  Narrative: EXAMINATION: XR CHEST 1 VW- 2/1/2018 16:12 CST  HISTORY: Fall, pain  Comparison: 12/20/2014  Findings: Heart and mediastinal contours appear normal. Increased interstitial markings are seen bilaterally with scattered patchy opacities. There is biapical pleural thickening. No pneumothorax is appreciated. There is no appreciable pleural effusion. The pulmonary vasculature appears normal.      Impression: Impression: Patchy opacities bilaterally are concerning for an infectious or inflammatory process. This report was finalized on 02/01/2018 16:13 by Dr. Cody Stanley MD.    Xr Hip With Or Without Pelvis 2 - 3 View Left    Result Date: 2/1/2018  Narrative: HISTORY: Fall with pain  Left hip: Frontal view the pelvis performed with frontal and cross table lateral views left hip.  There is a left subcapital femoral neck fracture with prominent impaction and superior displacement of the femoral neck in reference to the femoral head. No additional fracture is identified. Coarse pelvic calcification may represent a uterine leiomyoma. There are vascular calcifications.      Impression: 1. Impacted left subcapital femoral neck fracture. This report was finalized on 02/01/2018 16:13 by Dr. Shasta Crawford MD.        Assesment:       Active Problems:    Fall    Past Medical History:   Diagnosis Date   • Arthritis    • Hypertension        Plan:     1. Impacted left subcapital femur fracture s/p fall  2. UTI - e. Coli - present on admission  3. HTN  4. Encephalopathy  5. Iron  deficiency anemia  6. Hypothyroidism  7. HLD    Continue current treatment. Monitor counts. Increase activity. Begin antibiotic treatment for UTI. Check thyroid function. Replace iron. Await patient response to surgery.      Electronically signed by AMARIS Rodriguez on 2/2/2018 at 11:00 AM     Copy sent to Dr. Benito Known Provider       Electronically signed by Som Roach MD at 2/3/2018  7:27 AM        Prior to Admission Medications     Prescriptions Last Dose Informant Patient Reported? Taking?    atorvastatin (LIPITOR) 20 MG tablet Unknown Provider's Office Yes No    Take 20 mg by mouth Daily.    bisoprolol-hydrochlorothiazide (ZIAC) 5-6.25 MG per tablet Unknown Provider's Office Yes No    Take 1 tablet by mouth Daily.    brimonidine-timolol (COMBIGAN) 0.2-0.5 % ophthalmic solution   Yes Yes    Administer 1 drop to both eyes Every 12 (Twelve) Hours.    dorzolamide (TRUSOPT) 2 % ophthalmic solution   Yes Yes    Administer 1 drop to both eyes 3 (Three) Times a Day.    levothyroxine (SYNTHROID, LEVOTHROID) 100 MCG tablet Unknown Provider's Office Yes No    Take 100 mcg by mouth Daily.    tobramycin 0.3 % solution ophthalmic solution   Yes Yes    Administer 1 drop into the left eye 3 (Three) Times a Day.          Hospital Medications (active)       Dose Frequency Start End    bisoprolol (ZEBeta) 5 mg, hydrochlorothiazide (HYDRODIURIL) 6.25 mg for Ziac 5-6.25  Every 24 Hours Scheduled 2/1/2018     Sig - Route: Take  by mouth Daily. - Oral    ceFAZolin (ANCEF) 1 g/10ml IV PUSH syringe 1 g Every 8 Hours 2/2/2018 2/3/2018    Sig - Route: Infuse 10 mL into a venous catheter Every 8 (Eight) Hours. - Intravenous    cefTRIAXone (ROCEPHIN) 1 g/10mL IV PUSH syringe 1 g Every 24 Hours 2/3/2018 2/8/2018    Sig - Route: Infuse 10 mL into a venous catheter Daily. - Intravenous    dorzolamide (TRUSOPT) 2 % ophthalmic solution 1 drop 1 drop 3 Times Daily 2/3/2018     Sig - Route: Administer 1 drop to both eyes 3  "(Three) Times a Day. - Both Eyes    enoxaparin (LOVENOX) syringe 30 mg 30 mg Every 24 Hours 2/3/2018     Sig - Route: Inject 0.3 mL under the skin Daily. - Subcutaneous    ferrous sulfate tablet 325 mg 325 mg 2 Times Daily With Meals 2/2/2018     Sig - Route: Take 1 tablet by mouth 2 (Two) Times a Day With Meals. - Oral    HYDROcodone-acetaminophen (NORCO)  MG per tablet 1 tablet 1 tablet Every 4 Hours PRN 2/2/2018 2/12/2018    Sig - Route: Take 1 tablet by mouth Every 4 (Four) Hours As Needed for Severe Pain . - Oral    HYDROcodone-acetaminophen (NORCO) 5-325 MG per tablet 1 tablet 1 tablet Every 4 Hours PRN 2/2/2018 2/12/2018    Sig - Route: Take 1 tablet by mouth Every 4 (Four) Hours As Needed for Moderate Pain . - Oral    Influenza Vac Subunit Quad (FLUCELVAX) injection 0.5 mL 0.5 mL During Hospitalization 2/1/2018     Sig - Route: Inject 0.5 mL into the shoulder, thigh, or buttocks During Hospitalization for Immunization. - Intramuscular    lactated ringers infusion 9 mL/hr Continuous 2/2/2018     Sig - Route: Infuse 9 mL/hr into a venous catheter Continuous. - Intravenous    levothyroxine (SYNTHROID, LEVOTHROID) tablet 100 mcg 100 mcg Every Early Morning 2/1/2018     Sig - Route: Take 1 tablet by mouth Every Morning. - Oral    Morphine sulfate (PF) injection 1 mg 1 mg Every 4 Hours PRN 2/2/2018 2/12/2018    Sig - Route: Infuse 0.5 mL into a venous catheter Every 4 (Four) Hours As Needed for Moderate Pain . - Intravenous    Linked Group 1:  \"And\" Linked Group Details        Morphine sulfate (PF) injection 4 mg 4 mg Every 4 Hours PRN 2/1/2018 2/11/2018    Sig - Route: Infuse 1 mL into a venous catheter Every 4 (Four) Hours As Needed for Moderate Pain . - Intravenous    Linked Group 2:  \"And\" Linked Group Details        naloxone (NARCAN) injection 0.4 mg 0.4 mg Every 5 Minutes PRN 2/1/2018     Sig - Route: Infuse 1 mL into a venous catheter Every 5 (Five) Minutes As Needed for Respiratory Depression. - " "Intravenous    Linked Group 2:  \"And\" Linked Group Details        naloxone (NARCAN) injection 0.4 mg 0.4 mg Every 5 Minutes PRN 2/2/2018     Sig - Route: Infuse 1 mL into a venous catheter Every 5 (Five) Minutes As Needed for Respiratory Depression. - Intravenous    Linked Group 1:  \"And\" Linked Group Details        ondansetron (ZOFRAN) injection 4 mg 4 mg Every 6 Hours PRN 2/1/2018     Sig - Route: Infuse 2 mL into a venous catheter Every 6 (Six) Hours As Needed for Nausea or Vomiting. - Intravenous    ondansetron (ZOFRAN) injection 4 mg 4 mg Every 6 Hours PRN 2/2/2018     Sig - Route: Infuse 2 mL into a venous catheter Every 6 (Six) Hours As Needed for Nausea. - Intravenous    ondansetron (ZOFRAN) tablet 4 mg 4 mg Every 6 Hours PRN 2/2/2018     Sig - Route: Take 1 tablet by mouth Every 6 (Six) Hours As Needed for Nausea. - Oral    pneumococcal polysaccharide 23-valent (PNEUMOVAX-23) vaccine 0.5 mL 0.5 mL During Hospitalization 2/1/2018     Sig - Route: Inject 0.5 mL into the shoulder, thigh, or buttocks During Hospitalization for Immunization. - Intramuscular    sodium chloride 0.9 % flush 1-10 mL 1-10 mL As Needed 2/1/2018     Sig - Route: Infuse 1-10 mL into a venous catheter As Needed for Line Care. - Intravenous    sodium chloride 0.9 % flush 10 mL 10 mL As Needed 2/1/2018     Sig - Route: Infuse 10 mL into a venous catheter As Needed for Line Care. - Intravenous    Linked Group 3:  \"And\" Linked Group Details        sodium chloride 0.9 % infusion 100 mL/hr Continuous 2/2/2018     Sig - Route: Infuse 100 mL/hr into a venous catheter Continuous. - Intravenous    tobramycin 0.3 % ophthalmic solution 1 drop 1 drop 3 Times Daily 2/3/2018     Sig - Route: Administer 1 drop into the left eye 3 (Three) Times a Day. - Left Eye             Operative/Procedure Notes (most recent note)      Emory Barger MD at 2/2/2018  2:57 PM  Version 1 of 1         Orthopedic History and Physical    Pt Name: Andres Walter  MRN: " [id number]  YOB: 1929  Date: 2/2/2018     PREOPERATIVE DIAGNOSIS: Left subcapital displaced femoral neck fracture.      POSTOPERATIVE DIAGNOSIS: Left subcapital displaced femoral neck fracture.      PROCEDURE: Left hip hemiarthroplasty.      SURGEON: Emory Barger MD     ASSISTANT: Mathieu Bermudez PA-C     ANESTHESIA: General endotracheal anesthesia.      ESTIMATED BLOOD LOSS: 150 mL.      COMPLICATIONS: None.      CONDITION: Stable.      NOTE: An assistant surgeon was scrubbed and present throughout the entire procedure. He assisted and aided in limb position, as well as retractor placement. He also aided in implantation of the prosthesis and was responsible for wound closure.      COMPONENTS: DePuy Tri-Lock size 2 stem with a standard neck with +5 head and a 43 mm outer bipolar cup.      HISTORY: This is an 88-year-old female who fell at home. The patient presented to the emergency department with complaints of hip pain. X-rays demonstrated a subcapital femoral neck fracture. Based on this, the decision was made to take the patient to the operating room for the above-mentioned procedure. After the risks and benefits had been discussed with the patient and the patient was medically cleared, the patient was taken to the operative suite.      DESCRIPTION OF PROCEDURE: The patient was interviewed in the preanesthesia area, where the operative hip was marked with a marking pen. The patient was then taken to the operative suite where general endotracheal anesthesia was performed by the anesthesia team. The patient was then positioned in the lateral decubitus position over a pegboard and a VACPAC. Once the pelvis was stabilized patient was then prepped and draped in a standard sterile fashion using ChloraPrep. A standard posterior approach was carried out to the hip. The short external rotators and capsule were then taken down in a T-type fashion and marked with a #1 Vicryl suture. The femoral head was then  removed and sized on the back table for a size 43.      Attention was then turned to in the femur. A proximal femoral elevator was placed with Hohmann retractors around the femur. A canal finder was used to find the canal, followed by a box cutting osteotome. We then sequentially broached up to a size 2 Tri-Lock placed in about 15° of anteversion. We did use a calcar planer to make our neck cut 1 fingerbreadth above the lesser trochanter. Once this in position and a definitive 2 femur Tri-Lock femur was then placed. Trial reductions demonstrated the +5 head gave us the most stable construct. We then placed a +% head with a 43 mm outer bipolar cup and reduced the hip. It was stable throughout an arc of motion with symmetric limb lengths.      Attention was then turned to the posterior capsule closure. The capsule and short external rotators were closed using drill bit through the trochanter with the Vargas suture passer. The sutures were passed through the trochanter and tied over a bone bridge. Side-to-side figure-of-eight sutures were then used to repair the longitudinal portion of the capsulotomy. The hip was irrigated with pulsatile lavage. The IT band was then closed with #1 Vicryl suture. Skin was approximated with Vicryl and closed with Prineo wound closure system. The patient awoke from anesthesia without difficulty and was transferred to the PACU in stable condition. All sponge, needle and instrument counts were correct at the end of the procedure.            cc:       Emory Barger M.D.     Electronically signed by Emory Barger MD at 2/2/2018  3:52 PM           Physician Progress Notes (most recent note)      Som Roach MD at 2/3/2018  8:22 AM  Version 1 of 1         [facility]  Garry Roach M.D.  BARBI Rich APRN      Internal Medicine Progress Note    2/3/2018   12:22 PM    Name:  Andres Walter  MRN:    [id number]     Acct:     [id number]   Room:   341/1   Day: 2     Admit Date: 2/1/2018  2:22 PM  PCP: No Known Provider    Subjective:     C/C:   Chief Complaint   Patient presents with   • Fall   • Hip Pain       Interval History: Status:  Improved but now having pain in feet s/p fall.    Review of Systems   Constitution: Positive for weakness. Negative for chills, fever, malaise/fatigue and weight loss.   HENT: Negative for congestion, hoarse voice and nosebleeds.    Eyes: Negative for blurred vision and photophobia.   Cardiovascular: Negative for chest pain, dyspnea on exertion, near-syncope and orthopnea.   Respiratory: Negative for cough, shortness of breath and sputum production.    Endocrine: Negative for cold intolerance and polyuria.   Hematologic/Lymphatic: Negative for adenopathy and bleeding problem.   Skin: Negative for dry skin, itching and rash.   Musculoskeletal: Positive for joint swelling and myalgias. Negative for arthritis, back pain and joint pain.   Gastrointestinal: Negative for abdominal pain, diarrhea, heartburn, nausea and vomiting.   Genitourinary: Negative for dysuria and flank pain.   Neurological: Negative for dizziness, headaches, paresthesias and seizures.   Psychiatric/Behavioral: Negative for altered mental status and memory loss. The patient is not nervous/anxious.    Allergic/Immunologic: Negative for environmental allergies and hives.         Medications:     Allergies: No Known Allergies    Current Meds:   Current Facility-Administered Medications:   •  bisoprolol (ZEBeta) 5 mg, hydrochlorothiazide (HYDRODIURIL) 6.25 mg for Ziac 5-6.25, , Oral, Q24H, Som Roach MD  •  ceFAZolin (ANCEF) 1 g/10ml IV PUSH syringe, 1 g, Intravenous, Q8H, Emory Barger MD, 1 g at 02/03/18 0438  •  cefTRIAXone (ROCEPHIN) 1 g/10mL IV PUSH syringe, 1 g, Intravenous, Q24H, Som Roach MD  •  enoxaparin (LOVENOX) syringe 30 mg, 30 mg, Subcutaneous, Q24H, Emory Barger MD, 30 mg at 02/03/18 0896  •  ferrous sulfate tablet 325 mg, 325  mg, Oral, BID With Meals, Melisa Lee Tolu, APRN, 325 mg at 02/03/18 0834  •  HYDROcodone-acetaminophen (NORCO)  MG per tablet 1 tablet, 1 tablet, Oral, Q4H PRN, Emory Barger MD  •  HYDROcodone-acetaminophen (NORCO) 5-325 MG per tablet 1 tablet, 1 tablet, Oral, Q4H PRN, Emory Barger MD, 1 tablet at 02/03/18 0833  •  Influenza Vac Subunit Quad (FLUCELVAX) injection 0.5 mL, 0.5 mL, Intramuscular, During Hospitalization, Som Roach MD  •  lactated ringers infusion, 9 mL/hr, Intravenous, Continuous, Krishna Moon MD, Stopped at 02/02/18 1803  •  levothyroxine (SYNTHROID, LEVOTHROID) tablet 100 mcg, 100 mcg, Oral, Q AM, Som Roach MD, 100 mcg at 02/01/18 2038  •  Morphine sulfate (PF) injection 1 mg, 1 mg, Intravenous, Q4H PRN **AND** naloxone (NARCAN) injection 0.4 mg, 0.4 mg, Intravenous, Q5 Min PRN, Emory Barger MD  •  Morphine sulfate (PF) injection 4 mg, 4 mg, Intravenous, Q4H PRN **AND** naloxone (NARCAN) injection 0.4 mg, 0.4 mg, Intravenous, Q5 Min PRN, Som Roach MD  •  ondansetron (ZOFRAN) injection 4 mg, 4 mg, Intravenous, Q6H PRN, Som Roach MD  •  ondansetron (ZOFRAN) injection 4 mg, 4 mg, Intravenous, Q6H PRN, Emory Barger MD  •  ondansetron (ZOFRAN) tablet 4 mg, 4 mg, Oral, Q6H PRN, Emory Barger MD  •  pneumococcal polysaccharide 23-valent (PNEUMOVAX-23) vaccine 0.5 mL, 0.5 mL, Intramuscular, During Hospitalization, Som Roahc MD  •  sodium chloride 0.9 % flush 1-10 mL, 1-10 mL, Intravenous, PRN, Som Roach MD  •  Insert peripheral IV, , , Once **AND** sodium chloride 0.9 % flush 10 mL, 10 mL, Intravenous, PRN, Gabriela Barrett MD  •  sodium chloride 0.9 % infusion, 100 mL/hr, Intravenous, Continuous, Emory Barger MD, Last Rate: 100 mL/hr at 02/03/18 0512, 100 mL/hr at 02/03/18 0512    Data:     Code Status:  Full Code    History reviewed. No pertinent family history.    Social History     Social History   • Marital status:  "     Spouse name: N/A   • Number of children: N/A   • Years of education: N/A     Occupational History   • Not on file.     Social History Main Topics   • Smoking status: Never Smoker   • Smokeless tobacco: Never Used   • Alcohol use No   • Drug use: No   • Sexual activity: Defer     Other Topics Concern   • Not on file     Social History Narrative       Vitals:  /43 (BP Location: Right arm, Patient Position: Lying)  Pulse 72  Temp 97.8 °F (36.6 °C) (Oral)   Resp 18  Ht 157.5 cm (62\")  Wt 45.4 kg (100 lb)  SpO2 98%  BMI 18.29 kg/m2  Temp (24hrs), Av.5 °F (36.4 °C), Min:97.1 °F (36.2 °C), Max:98 °F (36.7 °C)            I/O (24Hr):    Intake/Output Summary (Last 24 hours) at 18 1222  Last data filed at 18 0517   Gross per 24 hour   Intake             1110 ml   Output              400 ml   Net              710 ml       Labs and imaging:      Lab Results (last 24 hours)     Procedure Component Value Units Date/Time    Urine Culture - Urine, Urine, Clean Catch [[id number]]  (Abnormal)  (Susceptibility) Collected:  18 1656    Specimen:  Urine from Urine, Catheter Updated:  18 1441     Urine Culture --      >100,000 CFU/mL Escherichia coli (A)    Susceptibility      Escherichia coli     FRANCISCA     Ampicillin <=2 ug/ml Susceptible     Ampicillin + Sulbactam <=2 ug/ml Susceptible     Cefazolin <=4 ug/ml Susceptible  [1]      Cefepime <=1 ug/ml Susceptible     Ceftriaxone <=1 ug/ml Susceptible     Ertapenem <=0.5 ug/ml Susceptible     ESBL Confirmation Test NEG  Negative     Gentamicin <=1 ug/ml Susceptible     Levofloxacin <=0.12 ug/ml Susceptible     Meropenem <=0.25 ug/ml Susceptible     Nitrofurantoin <=16 ug/ml Susceptible     Piperacillin + Tazobactam <=4 ug/ml Susceptible     Trimethoprim + Sulfamethoxazole <=20 ug/ml Susceptible            [1]   Cefazolin results may be used to predict the potential effectiveness of oral cephalosporins for treating uncomplicated urinary tract " infections.                 Blood Culture - Blood, [[id number]]  (Normal) Collected:  02/01/18 1708    Specimen:  Blood from Arm, Right Updated:  02/02/18 1731     Blood Culture No growth at 24 hours    Blood Culture - Blood, [[id number]]  (Normal) Collected:  02/01/18 1713    Specimen:  Blood from Arm, Left Updated:  02/02/18 1731     Blood Culture No growth at 24 hours    CBC (No Diff) [[id number]]  (Abnormal) Collected:  02/03/18 0417    Specimen:  Blood Updated:  02/03/18 0439     WBC 9.89 10*3/mm3      RBC 2.96 (L) 10*6/mm3      Hemoglobin 9.3 (L) g/dL      Hematocrit 28.5 (L) %      MCV 96.3 fL      MCH 31.4 pg      MCHC 32.6 (L) g/dL      RDW 14.2 %      RDW-SD 49.5 fl      MPV 9.4 fL      Platelets 381 10*3/mm3     Basic Metabolic Panel [[id number]]  (Abnormal) Collected:  02/03/18 0417    Specimen:  Blood Updated:  02/03/18 0453     Glucose 79 mg/dL      BUN 32 (H) mg/dL      Creatinine 1.04 mg/dL      Sodium 138 mmol/L      Potassium 4.1 mmol/L      Chloride 102 mmol/L      CO2 24.0 mmol/L      Calcium 8.5 mg/dL      eGFR Non African Amer 50 (L) mL/min/1.73      BUN/Creatinine Ratio 30.8 (H)     Anion Gap 12.0 mmol/L     Narrative:       The MDRD GFR formula is only valid for adults with stable renal function between ages 18 and 70.    TSH [[id number]]  (Abnormal) Collected:  02/03/18 0417    Specimen:  Blood Updated:  02/03/18 0521     TSH 52.200 (H) mIU/mL             Ct Head Without Contrast    Result Date: 2/1/2018  Narrative: CT HEAD WO CONTRAST- 2/1/2018 4:24 PM EST  HISTORY: fall ; altered mental status  COMPARISON: 12/28/2014  DOSE LENGTH PRODUCT: 1042 mGy cm. Automated exposure control was also utilized to decrease patient radiation dose.  TECHNIQUE: Axial images of brain obtained without IV contrast.  FINDINGS:  There is cortical atrophy, greatest within the frontal region. No intracranial hemorrhage or mass effect is identified. No acute signs of ischemia. There is no extra-axial hematoma or  subarachnoid hemorrhage.  There is near complete opacification of the bilateral sphenoid sinuses. Sphenoid opacification seen on the 2014 exam. There is mild mucosal thickening of the ethmoid sinuses. The mastoid air cells are well-aerated. The bony calvarium appears intact.      Impression: 1. Mild to moderate cortical atrophy, greatest within the frontal region. No acute intracranial abnormality. 2. Persistent chronic sphenoid opacification, chronic sinusitis. This report was finalized on 02/01/2018 15:44 by Dr. Shasta Crawford MD.    Xr Chest 1 View    Result Date: 2/1/2018  Narrative: EXAMINATION: XR CHEST 1 VW- 2/1/2018 16:12 CST  HISTORY: Fall, pain  Comparison: 12/20/2014  Findings: Heart and mediastinal contours appear normal. Increased interstitial markings are seen bilaterally with scattered patchy opacities. There is biapical pleural thickening. No pneumothorax is appreciated. There is no appreciable pleural effusion. The pulmonary vasculature appears normal.      Impression: Impression: Patchy opacities bilaterally are concerning for an infectious or inflammatory process. This report was finalized on 02/01/2018 16:13 by Dr. Cody Stanley MD.    Xr Hip With Or Without Pelvis 2 - 3 View Left    Result Date: 2/1/2018  Narrative: HISTORY: Fall with pain  Left hip: Frontal view the pelvis performed with frontal and cross table lateral views left hip.  There is a left subcapital femoral neck fracture with prominent impaction and superior displacement of the femoral neck in reference to the femoral head. No additional fracture is identified. Coarse pelvic calcification may represent a uterine leiomyoma. There are vascular calcifications.      Impression: 1. Impacted left subcapital femoral neck fracture. This report was finalized on 02/01/2018 16:13 by Dr. Shasta Crawford MD.                        Physical Examination:        Physical Exam   Constitutional: She is oriented to person, place, and time. She  appears well-developed and well-nourished.   HENT:   Head: Normocephalic and atraumatic.   Mouth/Throat: Oropharynx is clear and moist.   Eyes: Conjunctivae and EOM are normal. Pupils are equal, round, and reactive to light.   Neck: Normal range of motion. Neck supple.   Cardiovascular: Normal rate, regular rhythm, normal heart sounds and intact distal pulses.    Pulmonary/Chest: Effort normal and breath sounds normal.   Abdominal: Soft. Bowel sounds are normal. She exhibits no distension.   Musculoskeletal: Normal range of motion. She exhibits tenderness (pain with rom of bilateral lower extremeties). She exhibits no edema.   Neurological: She is alert and oriented to person, place, and time. No cranial nerve deficit.   Skin: Skin is warm and dry.   Psychiatric: She has a normal mood and affect. Her behavior is normal. Thought content normal.         Assessment:        Primary Problem  <principal problem not specified>     Active Problems:    Fall    Past Medical History:   Diagnosis Date   • Arthritis    • Encephalopathy    • Hypertension         Plan:        1. Impacted left subcapital femur fracture s/p fall  2. UTI - e. Coli - present on admission  3. HTN  4. Encephalopathy  5. Iron deficiency anemia  6. Hypothyroidism  7. HLD    Continue prn pain meds, continue iv fluids, continue abx, start therapy, replace iron, check xrays of bilateral ankles and feet      Electronically signed by Som Roach MD on 2/3/2018 at 12:22 PM              Electronically signed by Som Roach MD at 2/3/2018 12:29 PM           Consult Notes (most recent note)      JULI Hays Jr. at 2/1/2018  7:04 PM  Version 1 of 1    Attestation signed by Emory Barger MD at 2/2/2018  2:59 PM        I have reviewed the documentation above and agree.                                 Pt Name: Andres Walter  MRN: [id number]  YOB: 1929  Date: 02/01/18      HPI: 87 y/o female presented to the ED after an  unwitnessed fall at home last evening and a chief complaint of LEFT hip pain. The fall was unwitnessed and patient is demented and a very poor historian, all information comes from a neighbor who is here with her. She was found on the floor after laying for an unknown length of time. EMS was summoned and she was evaluated in the ED and seen to have a left subcapital femur fracture and ortho consulted for treatment.       Past Medical/Surgical History:   No past medical history on file.  No past surgical history on file.        Social History:    Smoking: none   Alcohol: does not drink    Medications:   No current facility-administered medications on file prior to encounter.      No current outpatient prescriptions on file prior to encounter.         Allergies: No Known Allergies      Review of systems:   Not obtained due to patient mental state and no family present.    Physical Exam:   Vitals:    02/01/18 1805   BP: 135/44   Pulse: 105   Resp: 20   Temp: 99.1 °F (37.3 °C)   SpO2: 97%       - General: normal development and appearance     - HEENT: NC/AT, JOLIE, EOMI, Nares appear patent bilaterally.     - Skin: pink, warm, normal tone and turgor    - Neck: supple, no masses    - Chest: no rales or wheezes, normal expansion bilaterally, no retractions seen    - Heart: RRR, no heaves or lifts seen.    - Abdomen: soft, nondistended, nontender    - Vascular: normal pulses, color, tone     - Pysch/Neuro: normal affect, mood, alert and oriented, no suicidal or homicidal ideations    - Musculoskeletal:   Left hip skin is clean, dry, and intact. Slight shortening of left leg noted. NVI distally, +2 pitting edema with borderline cellulitic appearing skin just proximal to the ankle. Reaction to light touch indicating full sensation and strong pulses. Tenderness to palpation noted inguinally and laterally. Positive log roll.     Radiology:  Left, displaced subcapital femoral neck fracture.    Impression:   Left, displaced  subcapital femoral neck fracture.    Surgical Plan:   Left hip lilibeth tomorrow.    Electronically signed by Mathieu Bermudez Jr., PA on 02/01/18 at 7:05 PM     Electronically signed by Emory Barger MD at 2/2/2018  2:59 PM           Physical Therapy Notes (most recent note)      Jaclyn Castano PTA at 2/4/2018 10:37 AM  Version 1 of 1         Problem: Patient Care Overview (Adult)  Goal: Plan of Care Review  Outcome: Ongoing (interventions implemented as appropriate)   02/04/18 1033   Coping/Psychosocial Response Interventions   Plan Of Care Reviewed With patient   Patient Care Overview   Progress progress toward functional goals is gradual   Outcome Evaluation   Outcome Summary/Follow up Plan Pt very anxious, didn't want to work w/ PT needed lots of encouragement. Bed mobility supine-sit max 2 using draw sheet. Once sitting pt immediatellyy wanted to lay bk down. encouraged movement, pt wasn't happy but followed through. She was able to stand mod x2 and take few sidesteps towards HOB. Reviewed THR ex's but pt refused to do any of them. Especially went over APs due to increased swelling and Plantarflexion of Both feet. Will cont to work w/ and enccourage Rx. Pt will benefit from SNF upon d/c            Electronically signed by Jaclyn Castano PTA at 2/4/2018 10:37 AM        Occupational Therapy Notes (most recent note)     No notes of this type exist for this encounter.           Speech Language Pathology Notes (most recent note)      Vamsi Jacinto CCC-SLP at 2/4/2018 10:42 AM  Version 1 of 1         Attempted to see pt for diet tolerance, however she refused PO trials. Spoke with nursing who reported the pt was given meds in applesauce, which she tolerate well. Continue current diet of regular solids and thin liquids and avoid straws with liquid intake.     02/04/18 1042   Time Calculation- SLP   SLP Start Time 1034   SLP Stop Time 1038   SLP Time Calculation (min) 4 min   SLP Non-Billable Time (min) 4 min    SLP Received On 02/04/18   Vamsi Jacinto CCC-SLP 2/4/2018 10:43 AM       Electronically signed by KATARZYNA Strickland at 2/4/2018 10:43 AM        Respiratory Therapy Notes (most recent note)     No notes of this type exist for this encounter.

## 2018-02-05 LAB
ANION GAP SERPL CALCULATED.3IONS-SCNC: 7 MMOL/L (ref 4–13)
BUN BLD-MCNC: 24 MG/DL (ref 5–21)
BUN/CREAT SERPL: 22 (ref 7–25)
CALCIUM SPEC-SCNC: 7.8 MG/DL (ref 8.4–10.4)
CHLORIDE SERPL-SCNC: 100 MMOL/L (ref 98–110)
CO2 SERPL-SCNC: 30 MMOL/L (ref 24–31)
CREAT BLD-MCNC: 1.09 MG/DL (ref 0.5–1.4)
DEPRECATED RDW RBC AUTO: 48.7 FL (ref 40–54)
ERYTHROCYTE [DISTWIDTH] IN BLOOD BY AUTOMATED COUNT: 13.9 % (ref 12–15)
GFR SERPL CREATININE-BSD FRML MDRD: 47 ML/MIN/1.73
GLUCOSE BLD-MCNC: 98 MG/DL (ref 70–100)
HCT VFR BLD AUTO: 26.8 % (ref 37–47)
HGB BLD-MCNC: 8.9 G/DL (ref 12–16)
MCH RBC QN AUTO: 31.8 PG (ref 28–32)
MCHC RBC AUTO-ENTMCNC: 33.2 G/DL (ref 33–36)
MCV RBC AUTO: 95.7 FL (ref 82–98)
PLATELET # BLD AUTO: 355 10*3/MM3 (ref 130–400)
PMV BLD AUTO: 9.6 FL (ref 6–12)
POTASSIUM BLD-SCNC: 3.4 MMOL/L (ref 3.5–5.3)
RBC # BLD AUTO: 2.8 10*6/MM3 (ref 4.2–5.4)
SODIUM BLD-SCNC: 137 MMOL/L (ref 135–145)
WBC NRBC COR # BLD: 10.77 10*3/MM3 (ref 4.8–10.8)

## 2018-02-05 PROCEDURE — 97530 THERAPEUTIC ACTIVITIES: CPT

## 2018-02-05 PROCEDURE — G9169 MEMORY GOAL STATUS: HCPCS

## 2018-02-05 PROCEDURE — 97535 SELF CARE MNGMENT TRAINING: CPT

## 2018-02-05 PROCEDURE — 92523 SPEECH SOUND LANG COMPREHEN: CPT | Performed by: SPEECH-LANGUAGE PATHOLOGIST

## 2018-02-05 PROCEDURE — 80048 BASIC METABOLIC PNL TOTAL CA: CPT | Performed by: INTERNAL MEDICINE

## 2018-02-05 PROCEDURE — G9168 MEMORY CURRENT STATUS: HCPCS

## 2018-02-05 PROCEDURE — 25010000002 ENOXAPARIN PER 10 MG: Performed by: ORTHOPAEDIC SURGERY

## 2018-02-05 PROCEDURE — 85027 COMPLETE CBC AUTOMATED: CPT | Performed by: INTERNAL MEDICINE

## 2018-02-05 PROCEDURE — G9170 MEMORY D/C STATUS: HCPCS

## 2018-02-05 PROCEDURE — 25010000002 CEFTRIAXONE PER 250 MG: Performed by: INTERNAL MEDICINE

## 2018-02-05 PROCEDURE — 97110 THERAPEUTIC EXERCISES: CPT

## 2018-02-05 PROCEDURE — 25010000002 FUROSEMIDE PER 20 MG: Performed by: INTERNAL MEDICINE

## 2018-02-05 RX ORDER — POTASSIUM CHLORIDE 750 MG/1
20 CAPSULE, EXTENDED RELEASE ORAL ONCE
Status: COMPLETED | OUTPATIENT
Start: 2018-02-05 | End: 2018-02-05

## 2018-02-05 RX ORDER — LACTULOSE 10 G/15ML
20 SOLUTION ORAL DAILY
Status: DISCONTINUED | OUTPATIENT
Start: 2018-02-05 | End: 2018-02-05 | Stop reason: ALTCHOICE

## 2018-02-05 RX ORDER — DOCUSATE SODIUM 100 MG/1
100 CAPSULE, LIQUID FILLED ORAL 2 TIMES DAILY
Status: DISCONTINUED | OUTPATIENT
Start: 2018-02-05 | End: 2018-02-07 | Stop reason: HOSPADM

## 2018-02-05 RX ORDER — LACTULOSE 20 G/30ML
20 SOLUTION ORAL DAILY
Status: DISCONTINUED | OUTPATIENT
Start: 2018-02-05 | End: 2018-02-07 | Stop reason: HOSPADM

## 2018-02-05 RX ADMIN — TOBRAMYCIN 1 DROP: 3 SOLUTION OPHTHALMIC at 17:06

## 2018-02-05 RX ADMIN — CEFTRIAXONE SODIUM 1 G: 1 INJECTION, POWDER, FOR SOLUTION INTRAMUSCULAR; INTRAVENOUS at 18:30

## 2018-02-05 RX ADMIN — BISOPROLOL FUMARATE: 5 TABLET ORAL at 08:36

## 2018-02-05 RX ADMIN — DORZOLAMIDE HYDROCHLORIDE 1 DROP: 20 SOLUTION/ DROPS OPHTHALMIC at 17:06

## 2018-02-05 RX ADMIN — FERROUS SULFATE TAB 325 MG (65 MG ELEMENTAL FE) 325 MG: 325 (65 FE) TAB at 08:36

## 2018-02-05 RX ADMIN — HYDROCODONE BITARTRATE AND ACETAMINOPHEN 1 TABLET: 5; 325 TABLET ORAL at 11:14

## 2018-02-05 RX ADMIN — HYDROCODONE BITARTRATE AND ACETAMINOPHEN 1 TABLET: 5; 325 TABLET ORAL at 17:09

## 2018-02-05 RX ADMIN — DORZOLAMIDE HYDROCHLORIDE 1 DROP: 20 SOLUTION/ DROPS OPHTHALMIC at 08:36

## 2018-02-05 RX ADMIN — TOBRAMYCIN 1 DROP: 3 SOLUTION OPHTHALMIC at 20:53

## 2018-02-05 RX ADMIN — TOBRAMYCIN 1 DROP: 3 SOLUTION OPHTHALMIC at 08:36

## 2018-02-05 RX ADMIN — DORZOLAMIDE HYDROCHLORIDE 1 DROP: 20 SOLUTION/ DROPS OPHTHALMIC at 20:52

## 2018-02-05 RX ADMIN — FUROSEMIDE 20 MG: 10 INJECTION, SOLUTION INTRAVENOUS at 08:37

## 2018-02-05 RX ADMIN — LEVOTHYROXINE SODIUM 100 MCG: 100 TABLET ORAL at 06:46

## 2018-02-05 RX ADMIN — DOCUSATE SODIUM 100 MG: 100 CAPSULE ORAL at 20:53

## 2018-02-05 RX ADMIN — FERROUS SULFATE TAB 325 MG (65 MG ELEMENTAL FE) 325 MG: 325 (65 FE) TAB at 17:06

## 2018-02-05 RX ADMIN — LACTULOSE 20 G: 20 SOLUTION ORAL at 17:06

## 2018-02-05 RX ADMIN — ENOXAPARIN SODIUM 30 MG: 30 INJECTION SUBCUTANEOUS at 08:36

## 2018-02-05 RX ADMIN — POTASSIUM CHLORIDE 20 MEQ: 750 CAPSULE, EXTENDED RELEASE ORAL at 09:41

## 2018-02-05 NOTE — PLAN OF CARE
Problem: Fall Risk (Adult)  Goal: Absence of Falls  Outcome: Ongoing (interventions implemented as appropriate)      Problem: Pressure Ulcer Risk (To Scale) (Adult,Obstetrics,Pediatric)  Goal: Skin Integrity  Outcome: Ongoing (interventions implemented as appropriate)      Problem: Patient Care Overview (Adult)  Goal: Plan of Care Review  Outcome: Ongoing (interventions implemented as appropriate)   02/05/18 1504   Coping/Psychosocial Response Interventions   Plan Of Care Reviewed With patient   Patient Care Overview   Progress no change   Outcome Evaluation   Outcome Summary/Follow up Plan No neuro changes. Safety maintained. Bed check. Turning. Abduction pillow in place. Mepilex to left hip CDI. PRN pain medication given upon request. Lactulose given for BM. Voiding, incontinent. Will continue to monitor.      Goal: Adult Individualization and Mutuality  Outcome: Ongoing (interventions implemented as appropriate)    Goal: Discharge Needs Assessment  Outcome: Ongoing (interventions implemented as appropriate)      Problem: Hip Replacement, Total (Adult)  Goal: Signs and Symptoms of Listed Potential Problems Will be Absent or Manageable (Hip Replacement, Total)  Outcome: Ongoing (interventions implemented as appropriate)      Problem: Fluid Volume Deficit (Adult)  Goal: Fluid/Electrolyte Balance  Outcome: Ongoing (interventions implemented as appropriate)    Goal: Comfort/Well Being  Outcome: Ongoing (interventions implemented as appropriate)

## 2018-02-05 NOTE — NURSING NOTE
Assessed patient's skin while PT assisted pt to stand at bedside.  Patient had incontinent episode, area was cleansed.  Excoriation present on bilateral buttock and coccyx area.  Z Guard paste was on wound and reapplied after being cleaned.  Barrier paste to be continued and frequent checking of incontinent episodes is suggested.

## 2018-02-05 NOTE — PLAN OF CARE
Problem: Patient Care Overview (Adult)  Goal: Plan of Care Review  Outcome: Ongoing (interventions implemented as appropriate)   02/05/18 1416   Coping/Psychosocial Response Interventions   Plan Of Care Reviewed With patient   Patient Care Overview   Progress improving   Outcome Evaluation   Outcome Summary/Follow up Plan Pt educated on benefits of activity. Pt min A x2 for bed mobility with bed rails and draw sheet with verbal cues. Pt set up with S for hygiene to jacey area and max A for buttocks. Pt would benefit from rehab at SNF at discharge. Continue OT POC

## 2018-02-05 NOTE — THERAPY TREATMENT NOTE
Acute Care - Physical Therapy Treatment Note  [facility]     Patient Name: Andres Walter  : 1929  MRN: [id number]  Today's Date: 2018  Onset of Illness/Injury or Date of Surgery Date: 18  Date of Referral to PT: 18  Referring Physician: Dr. Barger    Admit Date: 2018    Visit Dx:    ICD-10-CM ICD-9-CM   1. Fall, initial encounter W19.XXXA E888.9   2. Closed fracture of left hip, initial encounter S72.002A 820.8   3. Pneumonia of both lungs due to infectious organism, unspecified part of lung J18.9 483.8   4. Acute UTI N39.0 599.0   5. Abnormality of gait and mobility R26.9 781.2   6. Dysphagia, unspecified type R13.10 787.20   7. Impaired mobility and ADLs Z74.09 799.89     Patient Active Problem List   Diagnosis   • Fall               Adult Rehabilitation Note       18 1105 18 1500 18 1342    Rehab Assessment/Intervention    Discipline physical therapy assistant  -CW occupational therapy assistant  -CJ physical therapy assistant  -NW    Document Type therapy note (daily note)  -CW therapy note (daily note)  -CJ therapy note (daily note)  -NW    Subjective Information agree to therapy;complains of;pain;fatigue  -CW  complains of;pain;fatigue  -NW    Patient Effort, Rehab Treatment adequate  -CW  adequate  -NW    Treatment Not Performed  patient/family declined treatment  -CJ     Treatment Not Performed, Comment  --   Pt. refused 2x, states that her son will be here shortly!  -CJ     Precautions/Limitations fall precautions;hip precautions- left  -CW  fall precautions;hip precautions- left  -NW    Specific Treatment Considerations Requires max encouragement to participate  -CW  encourged pt to work w/PT, but wanted to be left alone  -NW    Recorded by [CW] Samina Nash PTA [CJ] PRASANTH Sanchez/RITA [NW] Jaclyn Castano PTA    Pain Assessment    Pain Assessment Tello-Cabral FACES  -CW  Tello-Cabral FACES  -NW    Tello-Baker FACES Pain Rating 10  -CW  10  -NW    Pain  Type Acute pain  -CW      Pain Location Hip  -CW  Hip  -NW    Pain Orientation Left  -CW  Left  -NW    Pain Frequency Intermittent   0/10 before movement  -CW  Constant/continuous  -NW    Pain Intervention(s) Repositioned;Medication (See MAR)  -CW  Medication (See MAR);Repositioned  -NW    Response to Interventions tolerated  -CW      Recorded by [CW] Samina Nash PTA  [NW] Jaclyn Castano PTA    Bed Mobility, Assessment/Treatment    Bed Mobility, Assistive Device draw sheet  -CW  draw sheet  -NW    Bed Mob, Supine to Sit, [place] maximum assist (25% patient effort);2 person assist required;verbal cues required  -CW  verbal cues required;maximum assist (25% patient effort);2 person assist required  -NW    Bed Mob, Sit to Supine, [place] maximum assist (25% patient effort);2 person assist required;verbal cues required  -CW  verbal cues required;maximum assist (25% patient effort);2 person assist required  -NW    Bed Mobility, Safety Issues cognitive deficits limit understanding;decreased use of legs for bridging/pushing  -CW  cognitive deficits limit understanding  -NW    Bed Mobility, Impairments pain;strength decreased  -CW  pain;strength decreased  -NW    Recorded by [CW] Samina Nash PTA  [NW] Jaclyn Castano PTA    Transfer Assessment/Treatment    Transfers, Sit-Stand [place] moderate assist (50% patient effort);maximum assist (25% patient effort);2 person assist required;verbal cues required  -CW  verbal cues required;moderate assist (50% patient effort);maximum assist (25% patient effort);2 person assist required  -NW    Transfers, Stand-Sit [place] moderate assist (50% patient effort);maximum assist (25% patient effort);2 person assist required;verbal cues required  -CW  verbal cues required;moderate assist (50% patient effort)  -NW    Transfers, Sit-Stand-Sit, Assist Device rolling walker  -CW  rolling walker  -NW    Transfer, Safety Issues loses balance backward  -CW       "Transfer, Impairments strength decreased;impaired balance;pain  -CW      Transfer, Comment Stood while nsg checked skin and changed brief.  Leans heavily posterior  -CW  attempt to take sidesteps this pm but pt unable \"please just lay me down\" encouraged pt to keep trying but adamently refused so got pt laid bk down  -NW    Recorded by [CW] Samina Nash PTA  [NW] Jaclyn Castano PTA    Motor Skills/Interventions    Additional Documentation Balance Skills Training (Group)  -CW      Recorded by [CW] Samina Nash PTA      Balance Skills Training    Sitting-Level of Assistance Close supervision;Contact guard  -CW      Sitting-Balance Support Feet supported  -CW      Standing-Level of Assistance Maximum assistance;x2  -CW      Static Standing Balance Support assistive device  -CW      Standing Balance # of Minutes --   leans heavily posterior  -CW      Recorded by [CW] Samina Nash PTA      Therapy Exercises    Bilateral Lower Extremities AROM:;5 reps;sitting;ankle pumps/circles;hip flexion;LAQ  -CW      Recorded by [CW] Samina Nash PTA      Positioning and Restraints    Pre-Treatment Position in bed  -CW  in bed  -NW    Post Treatment Position bed  -CW  bed  -NW    In Bed supine;call light within reach;encouraged to call for assist;ABD pillow;heels elevated  -CW  fowlers;call light within reach;encouraged to call for assist;notified nsg  -NW    Recorded by [CW] Samina Nash PTA  [NW] Jaclyn Castano PTA      02/04/18 0958 02/03/18 1130       Rehab Assessment/Intervention    Discipline physical therapy assistant  -NW      Document Type therapy note (daily note)  -NW      Subjective Information agree to therapy;complains of;pain  -NW      Patient Effort, Rehab Treatment adequate  -NW      Precautions/Limitations fall precautions;hip precautions- left  -NW      Specific Treatment Considerations pt very anxious needs encouragement to work w/ PT, was more worried about her son coming and " she wanted to see him,  -NW      Recorded by [NW] Jaclyn Castano PTA      Pain Assessment    Pain Assessment Tello-Baker FACES  -NW      Tello-Baker FACES Pain Rating 8  -NW      Pain Location Hip  -NW      Pain Orientation Left  -NW      Pain Frequency Constant/continuous  -NW      Pain Intervention(s) Repositioned;Medication (See MAR)  -NW      Recorded by [NW] Jaclyn Castano PTA      Swallow Assessment/Intervention    Additional Documentation  Dysphagia/Swallow Intervention (Group)  -MB     Recorded by  [MB] Vamsi Jacinto CCC-SLP     Dysphagia/Swallow Intervention    Dysphagia/Swallow Therapeutic Feed  Pt will tolerate trials of current diet with no overt s/s of aspiration.  -MB     Recorded by  [MB] Vamsi Jacinto CCC-SLP     Bed Mobility, Assessment/Treatment    Bed Mobility, Assistive Device draw sheet  -NW      Bed Mob, Supine to Sit, [place] verbal cues required;moderate assist (50% patient effort);maximum assist (25% patient effort);2 person assist required  -NW      Bed Mob, Sit to Supine, [place] verbal cues required;maximum assist (25% patient effort);2 person assist required  -NW      Bed Mobility, Safety Issues cognitive deficits limit understanding;decreased use of arms for pushing/pulling;decreased use of legs for bridging/pushing  -NW      Bed Mobility, Impairments strength decreased;pain  -NW      Recorded by [NW] Jaclyn Castnao PTA      Transfer Assessment/Treatment    Transfers, Sit-Stand [place] verbal cues required;moderate assist (50% patient effort);2 person assist required  -NW      Transfers, Stand-Sit [place] verbal cues required;moderate assist (50% patient effort)  -NW      Transfers, Sit-Stand-Sit, Assist Device rolling walker  -NW      Transfer, Safety Issues step length decreased;weight-shifting ability decreased  -NW      Transfer, Impairments pain;strength decreased  -NW      Recorded by [NW] Jaclyn Castano PTA      Gait Assessment/Treatment     Gait, [place] Level moderate assist (50% patient effort);2 person assist required  -NW      Gait, Assistive Device rolling walker  -NW      Gait, Distance (Feet) --   few sidesteps towards HOB  -NW      Gait, Gait Deviations step length decreased;stride length decreased  -NW      Gait, Safety Issues step length decreased;balance decreased during turns  -NW      Gait, Impairments pain;strength decreased  -NW      Gait, Comment pt very anxious needs lots of cues for safety  -NW      Recorded by [NW] Jaclyn Castano PTA      Therapy Exercises    Left Lower Extremity --   reviewed ex's but pt refused to follow through w/ them  -NW      Recorded by [NW] Jaclyn Castano PTA      Positioning and Restraints    Pre-Treatment Position in bed  -NW      Post Treatment Position bed  -NW      In Bed fowlers;call light within reach;encouraged to call for assist;exit alarm on;notified nsg  -NW      Recorded by [NW] Jaclyn Castano PTA        User Key  (r) = Recorded By, (t) = Taken By, (c) = Cosigned By    Initials Name Effective Dates    JANETH Jacinto, ZAY-SLP 08/02/16 -     CJ Cristian Carias RADER/L 08/02/16 -     CW Samina Nash, PTA 06/22/15 -     NW Jaclyn Castano, PTA 08/02/16 -                 IP PT Goals       02/03/18 1229          Bed Mobility PT LTG    Bed Mobility PT LTG, Date Established 02/03/18  -TB      Bed Mobility PT LTG, Time to Achieve by discharge  -TB      Bed Mobility PT LTG, Activity Type all bed mobility  -TB      Bed Mobility PT LTG, [place] Level supervision required  -TB      Transfer Training PT LTG    Transfer Training PT LTG, Date Established 02/03/18  -TB      Transfer Training PT LTG, Time to Achieve by discharge  -TB      Transfer Training PT LTG, Activity Type all transfers  -TB      Transfer Training PT LTG, [place] Level contact guard assist  -TB      Transfer Training PT LTG, Assist Device walker, rolling  -TB      Gait Training PT LTG    Gait Training Goal PT  LTG, Date Established 02/03/18  -TB      Gait Training Goal PT LTG, Time to Achieve by discharge  -TB      Gait Training Goal PT LTG, [place] Level contact guard assist  -TB      Gait Training Goal PT LTG, Assist Device walker, rolling  -TB      Gait Training Goal PT LTG, Distance to Achieve 20  -TB      Gait Training Goal PT LTG, Additional Goal able to stand upright without losing balance posterior with min limping  -TB      Range of Motion PT LTG    Range of Motion Goal PT LTG, Date Established 02/03/18  -TB      Range of Motion Goal PT LTG, Time to Achieve by discharge  -TB      Range of Motion Goal PT LTG, PROM Measure benja ankle DF >0 deg  -TB        User Key  (r) = Recorded By, (t) = Taken By, (c) = Cosigned By    Initials Name Provider Type    TB Josias White, PT Physical Therapist          Physical Therapy Education     Title: PT OT SLP Therapies (Active)     Topic: Physical Therapy (Active)     Point: Mobility training (Active)    Learning Progress Summary    Learner Readiness Method Response Comment Documented by Status   Patient Acceptance E,D NR Bed mobility, transfers, plan of care, benefit of activity CW 02/05/18 1143 Active    Acceptance E VU,NR benefits of activity NW 02/04/18 1037 Done    Acceptance E NR  TB 02/03/18 1227 Active   Family Acceptance E NR  TB 02/03/18 1227 Active               Point: Home exercise program (Active)    Learning Progress Summary    Learner Readiness Method Response Comment Documented by Status   Patient Acceptance E,D NR Bed mobility, transfers, plan of care, benefit of activity CW 02/05/18 1143 Active    Acceptance E NR  TB 02/03/18 1227 Active   Family Acceptance E NR  TB 02/03/18 1227 Active               Point: Body mechanics (Active)    Learning Progress Summary    Learner Readiness Method Response Comment Documented by Status   Patient Acceptance E,D NR Bed mobility, transfers, plan of care, benefit of activity CW 02/05/18 1143 Active    Acceptance E NR  TB  02/03/18 1227 Active   Family Acceptance E NR  TB 02/03/18 1227 Active               Point: Precautions (Active)    Learning Progress Summary    Learner Readiness Method Response Comment Documented by Status   Patient Acceptance E,D NR Bed mobility, transfers, plan of care, benefit of activity CW 02/05/18 1143 Active    Acceptance E NR  TB 02/03/18 1227 Active   Family Acceptance E NR  TB 02/03/18 1227 Active                      User Key     Initials Effective Dates Name Provider Type Discipline    TB 08/02/16 -  Josias White, PT Physical Therapist PT    CW 06/22/15 -  Samina Nash, PTA Physical Therapy Assistant PT    NW 08/02/16 -  Jaclyn Castano, PTA Physical Therapy Assistant PT                    PT Recommendation and Plan  Anticipated Equipment Needs At Discharge: gait belt, two wheeled walker  Anticipated Discharge Disposition: transitional care  Planned Therapy Interventions: strengthening, ROM (Range of Motion), balance training, bed mobility training, gait training, transfer training  PT Frequency: 2 times/day  Plan of Care Review  Plan Of Care Reviewed With: patient  Progress: progress towards functional goals is fair  Outcome Summary/Follow up Plan: Pt requires max encouragement to participate with therapy due to pain in L hip.  Max of 2 assist for bed mobility and sit to stand transfer.  Pt leans heavily posterior upon standing.  Performed active sitting exercises with BLE.  Sinan continue to benefit from therapy to increase strength and improve mobility.          Outcome Measures       02/05/18 1100 02/03/18 1400 02/03/18 1200    How much help from another person do you currently need...    Turning from your back to your side while in flat bed without using bedrails? 2  -CW  2  -TB    Moving from lying on back to sitting on the side of a flat bed without bedrails? 2  -CW  2  -TB    Moving to and from a bed to a chair (including a wheelchair)? 1  -CW  1  -TB    Standing up from a chair using  your arms (e.g., wheelchair, bedside chair)? 2  -CW  2  -TB    Climbing 3-5 steps with a railing? 1  -CW  1  -TB    To walk in hospital room? 1  -CW  1  -TB    AM-PAC 6 Clicks Score 9  -CW  9  -TB    How much help from another is currently needed...    Putting on and taking off regular lower body clothing?  2  -AC (r) MK (t) AC (c)     Bathing (including washing, rinsing, and drying)  2  -AC (r) MK (t) AC (c)     Toileting (which includes using toilet bed pan or urinal)  2  -AC (r) MK (t) AC (c)     Putting on and taking off regular upper body clothing  3  -AC (r) MK (t) AC (c)     Taking care of personal grooming (such as brushing teeth)  3  -AC (r) MK (t) AC (c)     Eating meals  3  -AC (r) MK (t) AC (c)     Score  15  -AC (r) MK (t)     Functional Assessment    Outcome Measure Options AM-PAC 6 Clicks Basic Mobility (PT)  -CW AM-PAC 6 Clicks Daily Activity (OT)  -AC (r) MK (t) AC (c) AM-PAC 6 Clicks Basic Mobility (PT)  -TB      User Key  (r) = Recorded By, (t) = Taken By, (c) = Cosigned By    Initials Name Provider Type    TB Josias White, PT Physical Therapist    AC Дмитрий Jacobsen, OTR/L Occupational Therapist    MÓNICA Nash PTA Physical Therapy Assistant    DIONNE Cavazos, OT Student OT Student           Time Calculation:         PT Charges       02/05/18 1146          Time Calculation    Start Time 1100  -CW      Stop Time 1131  -CW      Time Calculation (min) 31 min  -CW      PT Received On 02/05/18  -CW      PT Goal Re-Cert Due Date 02/13/18  -CW      Time Calculation- PT    Total Timed Code Minutes- PT 31 minute(s)  -CW        User Key  (r) = Recorded By, (t) = Taken By, (c) = Cosigned By    Initials Name Provider Type    MÓNICA Nash PTA Physical Therapy Assistant          Therapy Charges for Today     Code Description Service Date Service Provider Modifiers Qty    91351954016 HC PT THERAPEUTIC ACT EA 15 MIN 2/5/2018 Samina Nash PTA GP, KX 1    [id number]  PT THER PROC EA  15 MIN 2/5/2018 Samina Nash PTA GP, KX 1          PT G-Codes  Outcome Measure Options: AM-PAC 6 Clicks Basic Mobility (PT)  Score: 9  Functional Limitation: Mobility: Walking and moving around  Mobility: Walking and Moving Around Current Status (): At least 60 percent but less than 80 percent impaired, limited or restricted  Mobility: Walking and Moving Around Goal Status (): At least 40 percent but less than 60 percent impaired, limited or restricted    Samina Nash PTA  2/5/2018

## 2018-02-05 NOTE — PLAN OF CARE
Problem: Patient Care Overview (Adult)  Goal: Plan of Care Review  Outcome: Ongoing (interventions implemented as appropriate)   02/05/18 1144   Coping/Psychosocial Response Interventions   Plan Of Care Reviewed With patient   Patient Care Overview   Progress progress towards functional goals is fair   Outcome Evaluation   Outcome Summary/Follow up Plan Pt requires max encouragement to participate with therapy due to pain in L hip. Max of 2 assist for bed mobility and sit to stand transfer. Pt leans heavily posterior upon standing. Performed active sitting exercises with BLE. Will continue to benefit from therapy to increase strength and improve mobility.

## 2018-02-05 NOTE — THERAPY EVALUATION
Acute Care - Speech Language Pathology Initial Evaluation  [facility]     Patient Name: Andres Walter  : 1929  MRN: [id number]  Today's Date: 2018  Onset of Illness/Injury or Date of Surgery Date: 18  Date of Referral to SLP: (DELL) 18  Referring Physician: DESTINEE Roach (P)      Admit Date: 2018  Cognitive Linguistic evaluation completed. Patient was alert but only oriented to person. Patient refused to complete standardized assessment therefore an informal cognitive evaluation was presented. Throughout assessment it was apparent the pt exhibited deficits with functional problem solving, attention, and orientation to place, time, and situation. When asked to complete memory tasks patient responded with discontent, stating that her memory was fine. Further investigation shows that patient does have impaired short term memory yet uses the discontentment and refusal to hide her impairment.  Patient would benefit from ST services in order to target deficits previously mentioned.   Isabella Soliman, Speech Therapy Student 2018 3:02 PM    Visit Dx:    ICD-10-CM ICD-9-CM   1. Fall, initial encounter W19.XXXA E888.9   2. Closed fracture of left hip, initial encounter S72.002A 820.8   3. Pneumonia of both lungs due to infectious organism, unspecified part of lung J18.9 483.8   4. Acute UTI N39.0 599.0   5. Abnormality of gait and mobility R26.9 781.2   6. Dysphagia, unspecified type R13.10 787.20   7. Impaired mobility and ADLs Z74.09 799.89   8. Impaired cognition R41.89 294.9     Patient Active Problem List   Diagnosis   • Fall     Past Medical History:   Diagnosis Date   • Arthritis    • Encephalopathy    • Hypertension      Past Surgical History:   Procedure Laterality Date   • HIP HEMIARTHROPLASTY Left 2018    Procedure: HIP HEMIARTHROPLASTY;  Surgeon: Emory Barger MD;  Location: [facility];  Service:    • TOTAL HIP ARTHROPLASTY Left 2018          SLP EVALUATION (last 72 hours)       SLP Evaluation       02/05/18 1400                Rehab Evaluation    Document Type (P)  evaluation  -MM        Subjective Information (P)  complains of;fatigue  -MM        General Information    Patient Profile Review (P)  yes  -MM        Onset of Illness/Injury (P)  02/01/18  -MM        Referring Physician (P)  DESTINEE Roach  -MM        Pertinent History Of Current Problem (P)  Fall  -MM        Precautions/Limitations, Vision (P)  WFL  -MM        Precautions/Limitations, Hearing (P)  hearing impairment, bilaterally  -MM        Prior Level of Function- Communication (P)  functional for ADL's without assistance  -MM        Plans/Goals Discussed With (P)  patient  -MM        Barriers to Rehab (P)  cognitive status  -MM        Living Environment    Lives With (P)  alone  -MM        Living Arrangements (P)  house  -MM        Clinical Impression    Date of Referral to SLP (P)  02/05/18  -MM        SLP Diagnosis (P)  Impaired Cognition   -MM        Functional Level At Time Of Evaluation (P)  impaired  -MM        Patient's Goals For Discharge (P)  return to all previous roles/activities  -MM        Criteria for Skilled Therapeutic Interventions Met (P)  skilled criteria for cognitive linguistic intervention met  -MM        Rehab Potential (P)  good, to achieve stated therapy goals  -MM        Therapy Frequency (P)  2-3 times/wk  -MM        Predicted Duration of Therapy Intervention (days/wks) (P)  until discharge  -MM        Expected Duration of Therapy Session (min) (P)  15-30 minutes  -MM        Anticipated Discharge Disposition (P)  skilled nursing facility  -MM        Pain Assessment    Pain Assessment (P)  Tello-Cabral FACES  -MM        Tello-Cabral FACES Pain Rating (P)  4  -MM        Cognitive Assessment/Intervention    Current Cognitive/Communication Assessment (P)  impaired  -MM        Orientation Status (P)  oriented to;person;disoriented to;place;time;situation  -MM        Follows Commands/Answers Questions (P)  able  "to follow single-step instructions;75% of the time  -MM        Personal Safety (P)  moderate impairment;severe impairment;decreased awareness, need for assist;decreased awareness, need for safety;decreased insight to deficits  -MM        Short/Long Term Memory (P)  moderate impairment, short term memory;severe impairment, short term memory;decreased recall, biographical info;decreased recall, precautions  -MM        Short Term Memory Interventions (P)  increase orientation (cascade);increase attention span to ADL/functional task (cascade);safety awareness for ADLs/functional task (cascade)  -MM        Increase Orientation (P)  place:;time:;25% verbal cues  -MM        Safety Awareness for ADL/Func. Task (P)  with max verbal cues  -MM        Communication Treatment Objective and Progress    Cognitive Linguistic Treatment Objectives (P)  Improve attention;Improve orientation;Improve memory skills;Improve functional problem solving  -MM        Improve attention    Improve attention by: (P)  complete sustained attention task;90%;without cues  -MM        Status: Improve attention (P)  New  -MM        Improve orientation    Improve orientation through: (P)  demonstrating orientation to month;demonstrating orientation to year;demonstrating orientation to place;demonstrating orientation to disease/impairment;80%;without cues  -MM        Status: Improve orientation through (P)  New  -MM        Improve memory skills    Improve memory skills through: (P)  listen to a paragraph and answer questions;recall details of the day;use memory strategies;90%;without cues  -MM        Status: Improve memory skills (P)  New  -MM        Improve functional problem solving    Improve functional problem solving through: (P)  determine solutions to simple ADL/safety problems;answer \"what if\" questions;90%;without cues  -MM        Status: Improve functional problem solving (P)  New  -MM          User Key  (r) = Recorded By, (t) = Taken By, (c) = " Cosigned By    Initials Name Effective Dates    MM Isabella RITA Soliman, Speech Therapy Student 12/12/17 -            EDUCATION  The patient has been educated in the following areas:   Cognitive Impairment.    SLP Recommendation and Plan  SLP Diagnosis: (P) Impaired Cognition      Rehab Potential: (P) good, to achieve stated therapy goals  Criteria for Skilled Therapeutic Interventions Met: (P) skilled criteria for cognitive linguistic intervention met  Anticipated Discharge Disposition: (P) skilled nursing facility     Therapy Frequency: (P) 2-3 times/wk  Predicted Duration of Therapy Intervention (days/wks): (P) until discharge  Expected Duration of Therapy Session (min): (P) 15-30 minutes    Plan of Care Review  Plan Of Care Reviewed With: (P) patient  Progress: (P) no change (Initial Eval )  Outcome Summary/Follow up Plan: (P) Cognitive Linguistic evaluation completed. Patient was alert but only oriented to person. Patient refused to complete standardized assessment therefore an informal cognitive evaluation was presented. Throughout assessment it was apparent the pt exhibited deficits with functional problem solving, attention, and orientation to place, time, and situation. When asked to complete memory tasks patient responded with discontent, stating that her memory was fine. Further investigation shows that patient does have impaired short term memory yet uses the discontentment and refusal to hide her impairment.  Patient would benefit from ST services in order to target deficits previously mentioned.           IP SLP Goals       02/05/18 1446 02/03/18 1418       Safely Consume Diet    Safely Consume Diet- SLP, Date Established  02/03/18  -MB     Safely Consume Diet- SLP, Time to Achieve  2 days  -MB     Safely Consume Diet- SLP, Additional Goal  Pt will tolerate trials of current diet with no overt s/s of aspiration.  -MB     Safely Consume Diet- SLP, Outcome  goal ongoing  -MB     Cognitive Linguistic-  Improve Safety and Awareness    Cognitive Linguistic Improve Safety and Awareness- SLP, Date Established (P)  02/05/18  -MM      Cognitive Linguistic Improve Safety and Awareness- SLP, Time to Achieve (P)  by discharge  -MM      Cognitive Linguistic Improve Safety and Awareness- SLP, Activity Level (P)  Patient will improve attention skills for increased safety in environment;Patient will improve orientation for increased safety in environment;Patient will improve memory skills for increased safety in environment;Patient will improve functional problem solving skills for increased safety in environment  -MM      Cognitive Linguistic Improve Safety and Awareness- SLP, Date Goal Reviewed (P)  02/05/18  -MM      Cognitive Linguistic Improve Safety and Awareness- SLP, Outcome (P)  goal ongoing  -MM        User Key  (r) = Recorded By, (t) = Taken By, (c) = Cosigned By    Initials Name Provider Type    JANETH Jacinto CCC-SLP Speech and Language Pathologist    ERICA Soliman, Speech Therapy Student Speech Therapy Student             SLP Outcome Measures (last 72 hours)      SLP Outcome Measures       02/05/18 1400 02/03/18 1400       SLP Outcome Measures    Outcome Measure Used? (P)  Adult NOMS  -MM Adult NOMS  -MB     OTHER    SLP Diagnoses (P)  Cognitive-Communication Disorder  -MM      FCM Scores    FCM Chosen (P)  Memory  -MM Swallowing  -MB     Swallowing FCM Score  6  -MB     Memory FCM Score (P)  4  -MM        User Key  (r) = Recorded By, (t) = Taken By, (c) = Cosigned By    Initials Name Effective Dates    JANETH Jacinto CCC-SLP 08/02/16 -     ERICA Soliman, Speech Therapy Student 12/12/17 -           Time Calculation:         Time Calculation- SLP       02/05/18 1501          Time Calculation- SLP    SLP Start Time (P)  1400  -MM      SLP Stop Time (P)  1502  -MM      SLP Time Calculation (min) (P)  62 min  -MM      SLP Received On (P)  02/05/18  -MM      SLP Goal Re-Cert Due Date (P)   02/15/18  -MM        User Key  (r) = Recorded By, (t) = Taken By, (c) = Cosigned By    Initials Name Provider Type    MM Isabella Soliman, Speech Therapy Student Speech Therapy Student          Therapy Charges for Today     Code Description Service Date Service Provider Modifiers Qty    [id number]  ST EVAL SPEECH AND PROD W LANG  4 2/5/2018 Isabella Soliman, Speech Therapy Student GN, KX 1             ADULT NOMS (last 72 hours)      Adult NOMS       02/05/18 1400 02/03/18 1400             OTHER    SLP Diagnoses (P)  Cognitive-Communication Disorder  -MM        FCM Scores    FCM Chosen (P)  Memory  -MM Swallowing  -MB       Swallowing FCM Score  6  -MB       Memory FCM Score (P)  4  -MM          User Key  (r) = Recorded By, (t) = Taken By, (c) = Cosigned By    Initials Name Effective Dates    JANETH Jacinto CCC-SLP 08/02/16 -     MM Isabella Soliman Speech Therapy Student 12/12/17 -         SLP G-Codes  SLP NOMS Used?: (P) Yes  Functional Limitations: (P) Memory  Swallow Current Status (): At least 1 percent but less than 20 percent impaired, limited or restricted  Swallow Goal Status (): At least 1 percent but less than 20 percent impaired, limited or restricted  Memory Current Status (): (P) At least 40 percent but less than 60 percent impaired, limited or restricted  Memory Goal Status (): (P) At least 40 percent but less than 60 percent impaired, limited or restricted  Memory Discharge Status (): (P) At least 40 percent but less than 60 percent impaired, limited or restricted      Isabella Soliman Speech Therapy Student  2/5/2018

## 2018-02-05 NOTE — THERAPY TREATMENT NOTE
Acute Care - Occupational Therapy Treatment Note  [facility]     Patient Name: Andres Walter  : 1929  MRN: [id number]  Today's Date: 2018  Onset of Illness/Injury or Date of Surgery Date: 18  Date of Referral to OT: 18  Referring Physician: Dr. Barger      Admit Date: 2018    Visit Dx:     ICD-10-CM ICD-9-CM   1. Fall, initial encounter W19.XXXA E888.9   2. Closed fracture of left hip, initial encounter S72.002A 820.8   3. Pneumonia of both lungs due to infectious organism, unspecified part of lung J18.9 483.8   4. Acute UTI N39.0 599.0   5. Abnormality of gait and mobility R26.9 781.2   6. Dysphagia, unspecified type R13.10 787.20   7. Impaired mobility and ADLs Z74.09 799.89     Patient Active Problem List   Diagnosis   • Fall             Adult Rehabilitation Note       18 1333 18 1105 18 1500    Rehab Assessment/Intervention    Discipline occupational therapy assistant  -TS physical therapy assistant  -CW occupational therapy assistant  -CJ    Document Type therapy note (daily note)  -TS therapy note (daily note)  -CW therapy note (daily note)  -CJ    Subjective Information agree to therapy;complains of;pain  -TS agree to therapy;complains of;pain;fatigue  -CW     Patient Effort, Rehab Treatment good  -TS adequate  -CW     Treatment Not Performed   patient/family declined treatment  -CJ    Treatment Not Performed, Comment   --   Pt. refused 2x, states that her son will be here shortly!  -    Precautions/Limitations fall precautions;hip precautions- left  -TS fall precautions;hip precautions- left  -CW     Specific Treatment Considerations  Requires max encouragement to participate  -CW     Recorded by [TS] PRASANTH Valdez/RITA [CW] Samina Nash PTA [CJ] PRASANTH Sanchez/L    Pain Assessment    Pain Assessment 0-10  -TS Tello-Baker FACES  -CW     Tello-Baker FACES Pain Rating  10  -CW     Pain Score 4  -TS      Pain Type Acute pain;Surgical pain   -TS Acute pain  -CW     Pain Location Hip  -TS Hip  -CW     Pain Orientation Right;Left  -TS Left  -CW     Pain Descriptors Aching  -TS      Pain Frequency Intermittent  -TS Intermittent   0/10 before movement  -CW     Pain Intervention(s) Repositioned  -TS Repositioned;Medication (See MAR)  -CW     Response to Interventions pt states that her R hip has more pain than L hip  -TS tolerated  -CW     Recorded by [TS] PRASANTH Valdez/RITA [CW] Samina Nash PTA     Bed Mobility, Assessment/Treatment    Bed Mobility, Assistive Device bed rails;draw sheet  -TS draw sheet  -CW     Bed Mobility, Roll Left, [place] minimum assist (75% patient effort);2 person assist required  -TS      Bed Mob, Supine to Sit, [place]  maximum assist (25% patient effort);2 person assist required;verbal cues required  -CW     Bed Mob, Sit to Supine, [place]  maximum assist (25% patient effort);2 person assist required;verbal cues required  -CW     Bed Mobility, Safety Issues  cognitive deficits limit understanding;decreased use of legs for bridging/pushing  -CW     Bed Mobility, Impairments  pain;strength decreased  -CW     Recorded by [TS] PRASANTH Valdez/RITA [CW] Samina Nash PTA     Transfer Assessment/Treatment    Transfers, Sit-Stand [place]  moderate assist (50% patient effort);maximum assist (25% patient effort);2 person assist required;verbal cues required  -CW     Transfers, Stand-Sit [place]  moderate assist (50% patient effort);maximum assist (25% patient effort);2 person assist required;verbal cues required  -CW     Transfers, Sit-Stand-Sit, Assist Device  rolling walker  -CW     Transfer, Safety Issues  loses balance backward  -CW     Transfer, Impairments  strength decreased;impaired balance;pain  -CW     Transfer, Comment  Stood while nsg checked skin and changed brief.  Leans heavily posterior  -CW     Recorded by  [CW] Samina Nash PTA     Lower Body Dressing  Assessment/Training    LB Dressing Assess/Train, Clothing Type doffing:   brief  -TS      LB Dressing Assess/Train, Position supine  -TS      LB Dressing Assess/Train, [place] maximum assist (25% patient effort)  -TS      LB Dressing Assess/Train, Impairments pain;ROM decreased  -TS      Recorded by [TS] LÓPEZ Valdez      Toileting Assessment/Training    Toileting Assess/Train, Position supine   fowlers  -TS      Toileting Assess/Train, Indepen Level supervision required;maximum assist (25% patient effort)  -TS      Toileting Assess/Train, Comment S for hygiene to jacey area, max A for hygiene to buttocks  -TS      Recorded by [TS] LÓPEZ Valdez      Motor Skills/Interventions    Additional Documentation  Balance Skills Training (Group)  -CW     Recorded by  [CW] Samina Nash PTA     Balance Skills Training    Sitting-Level of Assistance  Close supervision;Contact guard  -CW     Sitting-Balance Support  Feet supported  -CW     Standing-Level of Assistance  Maximum assistance;x2  -CW     Static Standing Balance Support  assistive device  -CW     Standing Balance # of Minutes  --   leans heavily posterior  -CW     Recorded by  [CW] Samina Nash PTA     Therapy Exercises    Bilateral Lower Extremities  AROM:;5 reps;sitting;ankle pumps/circles;hip flexion;LAQ  -CW     Recorded by  [CW] Samina Nash PTA     Positioning and Restraints    Pre-Treatment Position in bed  -TS in bed  -CW     Post Treatment Position bed  -TS bed  -CW     In Bed side lying left;call light within reach;encouraged to call for assist;exit alarm on;side rails up x2;pillow between legs  -TS supine;call light within reach;encouraged to call for assist;ABD pillow;heels elevated  -CW     Recorded by [TS] LÓPEZ Valdez [CW] Samina Nash PTA       02/04/18 1342 02/04/18 0958 02/03/18 1130    Rehab Assessment/Intervention    Discipline physical therapy assistant  -NW physical  therapy assistant  -NW     Document Type therapy note (daily note)  -NW therapy note (daily note)  -NW     Subjective Information complains of;pain;fatigue  -NW agree to therapy;complains of;pain  -NW     Patient Effort, Rehab Treatment adequate  -NW adequate  -NW     Precautions/Limitations fall precautions;hip precautions- left  -NW fall precautions;hip precautions- left  -NW     Specific Treatment Considerations encourged pt to work w/PT, but wanted to be left alone  -NW pt very anxious needs encouragement to work w/ PT, was more worried about her son coming and she wanted to see him,  -NW     Recorded by [NW] Jaclyn Castano PTA [NW] Jaclyn Castano PTA     Pain Assessment    Pain Assessment Tello-Baker FACES  -NW Tello-Baker FACES  -NW     Tello-Baker FACES Pain Rating 10  -NW 8  -NW     Pain Location Hip  -NW Hip  -NW     Pain Orientation Left  -NW Left  -NW     Pain Frequency Constant/continuous  -NW Constant/continuous  -NW     Pain Intervention(s) Medication (See MAR);Repositioned  -NW Repositioned;Medication (See MAR)  -NW     Recorded by [NW] Jaclyn Castano PTA [NW] Jaclyn Castano PTA     Swallow Assessment/Intervention    Additional Documentation   Dysphagia/Swallow Intervention (Group)  -MB    Recorded by   [MB] Vamsi Jacinto CCC-SLP    Dysphagia/Swallow Intervention    Dysphagia/Swallow Therapeutic Feed   Pt will tolerate trials of current diet with no overt s/s of aspiration.  -MB    Recorded by   [MB] Vamsi Jacinto CCC-SLP    Bed Mobility, Assessment/Treatment    Bed Mobility, Assistive Device draw sheet  -NW draw sheet  -NW     Bed Mob, Supine to Sit, [place] verbal cues required;maximum assist (25% patient effort);2 person assist required  -NW verbal cues required;moderate assist (50% patient effort);maximum assist (25% patient effort);2 person assist required  -NW     Bed Mob, Sit to Supine, [place] verbal cues required;maximum assist (25% patient effort);2 person assist  "required  -NW verbal cues required;maximum assist (25% patient effort);2 person assist required  -NW     Bed Mobility, Safety Issues cognitive deficits limit understanding  -NW cognitive deficits limit understanding;decreased use of arms for pushing/pulling;decreased use of legs for bridging/pushing  -NW     Bed Mobility, Impairments pain;strength decreased  -NW strength decreased;pain  -NW     Recorded by [NW] Jaclyn Castano PTA [NW] Jaclyn Castano PTA     Transfer Assessment/Treatment    Transfers, Sit-Stand [place] verbal cues required;moderate assist (50% patient effort);maximum assist (25% patient effort);2 person assist required  -NW verbal cues required;moderate assist (50% patient effort);2 person assist required  -NW     Transfers, Stand-Sit [place] verbal cues required;moderate assist (50% patient effort)  -NW verbal cues required;moderate assist (50% patient effort)  -NW     Transfers, Sit-Stand-Sit, Assist Device rolling walker  -NW rolling walker  -NW     Transfer, Safety Issues  step length decreased;weight-shifting ability decreased  -NW     Transfer, Impairments  pain;strength decreased  -NW     Transfer, Comment attempt to take sidesteps this pm but pt unable \"please just lay me down\" encouraged pt to keep trying but adamently refused so got pt laid bk down  -NW      Recorded by [NW] Jaclyn Castano PTA [NW] Jaclyn Castano PTA     Gait Assessment/Treatment    Gait, [place] Level  moderate assist (50% patient effort);2 person assist required  -NW     Gait, Assistive Device  rolling walker  -NW     Gait, Distance (Feet)  --   few sidesteps towards HOB  -NW     Gait, Gait Deviations  step length decreased;stride length decreased  -NW     Gait, Safety Issues  step length decreased;balance decreased during turns  -NW     Gait, Impairments  pain;strength decreased  -NW     Gait, Comment  pt very anxious needs lots of cues for safety  -NW     Recorded by  [NW] Jaclyn Castano PTA     " Therapy Exercises    Left Lower Extremity  --   reviewed ex's but pt refused to follow through w/ them  -NW     Recorded by  [NW] Jaclyn Castano PTA     Positioning and Restraints    Pre-Treatment Position in bed  -NW in bed  -NW     Post Treatment Position bed  -NW bed  -NW     In Bed fowlers;call light within reach;encouraged to call for assist;notified nsg  -NW fowlers;call light within reach;encouraged to call for assist;exit alarm on;notified nsg  -NW     Recorded by [NW] Jaclyn Castano PTA [NW] Jaclyn Castano PTA       User Key  (r) = Recorded By, (t) = Taken By, (c) = Cosigned By    Initials Name Effective Dates    MB Vamsi Jacinto, [facility]-SLP 08/02/16 -     CJ Cristian Carias, RADER/L 08/02/16 -     TS Nallely Tripp, RADER/L 08/02/16 -     CW Samina Nash, PTA 06/22/15 -     NW Jaclyn Castano, PTA 08/02/16 -                 OT Goals       02/03/18 1432          Transfer Training OT LTG    Transfer Training OT LTG, Date Established 02/03/18  -AC (r) MK (t) AC (c)      Transfer Training OT LTG, Time to Achieve by discharge  -AC (r) MK (t) AC (c)      Transfer Training OT LTG, Activity Type toilet;tub  -AC (r) MK (t) AC (c)      Transfer Training OT LTG, [place] Level moderate assist (50% patient effort)  -AC (r) MK (t) AC (c)      Transfer Training OT LTG, Assist Device tub bench;commode, bedside  -AC (r) MK (t) AC (c)      Patient Education OT LTG    Patient Education OT LTG, Date Established 02/03/18  -AC (r) MK (t) AC (c)      Patient Education OT LTG, Time to Achieve by discharge  -AC (r) MK (t) AC (c)      Patient Education OT LTG, Education Type precautions per surgeon;home safety;adaptive equipment mgmt  -AC (r) MK (t) AC (c)      Patient Education OT LTG, Education Understanding demonstrates adequately  -AC (r) MK (t) AC (c)      Patient Education OT LTG, Additional Goal with min vc  -AC (r) DIONNE (t) EV (c)      Toileting OT LTG    Toileting Goal OT LTG, Date Established 02/03/18   -AC (r) MK (t) AC (c)      Toileting Goal OT LTG, Time to Achieve by discharge  -AC (r) MK (t) AC (c)      Toileting Goal OT LTG, [place] Level moderate assist (50% patient effort)  -AC (r) MK (t) AC (c)      LB Dressing OT LTG    LB Dressing Goal OT LTG, Date Established 02/03/18  -AC (r) MK (t) AC (c)      LB Dressing Goal OT LTG, Time to Achieve by discharge  -AC (r) MK (t) AC (c)      LB Dressing Goal OT LTG, [place] Level moderate assist (50% patient effort)  -AC (r) MK (t) AC (c)      LB Dressing Goal OT LTG, Adaptive Equipment reacher;sock-aid;dressing stick  -AC (r) MK (t) AC (c)        User Key  (r) = Recorded By, (t) = Taken By, (c) = Cosigned By    Initials Name Provider Type    EV Jacobsen, OTR/L Occupational Therapist    DIONNE Cavazos, OT Student OT Student          Occupational Therapy Education     Title: PT OT SLP Therapies (Active)     Topic: Occupational Therapy (Done)     Point: ADL training (Done)    Description: Instruct learner(s) on proper safety adaptation and remediation techniques during self care or transfers.   Instruct in proper use of assistive devices.    Learning Progress Summary    Learner Readiness Method Response Comment Documented by Status   Patient Acceptance E VU benefits of activity, bed mobility TS 02/05/18 1415 Done    Acceptance E VU,NR benefits of OT, benefit of activity  02/03/18 1419 Done               Point: Precautions (Done)    Description: Instruct learner(s) on prescribed precautions during self-care and functional transfers.    Learning Progress Summary    Learner Readiness Method Response Comment Documented by Status   Patient Acceptance E VU benefits of activity, bed mobility TS 02/05/18 1415 Done    Acceptance E VU,NR benefits of OT, benefit of activity  02/03/18 1419 Done               Point: Body mechanics (Done)    Description: Instruct learner(s) on proper positioning and spine alignment during self-care, functional mobility activities  and/or exercises.    Learning Progress Summary    Learner Readiness Method Response Comment Documented by Status   Patient Acceptance E VU,NR benefits of OT, benefit of activity  02/03/18 1419 Done                      User Key     Initials Effective Dates Name Provider Type Discipline     08/02/16 -  PRASANTH Valdez/L Occupational Therapy Assistant OT     11/20/17 -  Gabbi Cavazos OT Student OT Student OT                  OT Recommendation and Plan  Anticipated Discharge Disposition: skilled nursing facility  Planned Therapy Interventions: ADL retraining, balance training, bed mobility training, strengthening, transfer training, adaptive equipment training  Therapy Frequency: 3-5 times/wk  Plan of Care Review  Plan Of Care Reviewed With: patient  Progress: improving  Outcome Summary/Follow up Plan: Pt educated on benefits of activity. Pt min A x2 for bed mobility with bed rails and draw sheet with verbal cues. Pt set up with S for hygiene to jacey area and max A for buttocks. Pt would benefit from rehab at SNF at discharge. Continue OT POC         Outcome Measures       02/05/18 1400 02/05/18 1100 02/03/18 1400    How much help from another person do you currently need...    Turning from your back to your side while in flat bed without using bedrails?  2  -CW     Moving from lying on back to sitting on the side of a flat bed without bedrails?  2  -CW     Moving to and from a bed to a chair (including a wheelchair)?  1  -CW     Standing up from a chair using your arms (e.g., wheelchair, bedside chair)?  2  -CW     Climbing 3-5 steps with a railing?  1  -CW     To walk in hospital room?  1  -CW     AM-PAC 6 Clicks Score  9  -CW     How much help from another is currently needed...    Putting on and taking off regular lower body clothing? 2  -TS  2  -AC (r) MK (t) AC (c)    Bathing (including washing, rinsing, and drying) 2  -TS  2  -AC (r) MK (t) AC (c)    Toileting (which includes using toilet bed pan  or urinal) 2  -TS  2  -AC (r) MK (t) AC (c)    Putting on and taking off regular upper body clothing 3  -TS  3  -AC (r) MK (t) AC (c)    Taking care of personal grooming (such as brushing teeth) 3  -TS  3  -AC (r) MK (t) AC (c)    Eating meals 4  -TS  3  -AC (r) MK (t) AC (c)    Score 16  -TS  15  -AC (r) MK (t)    Functional Assessment    Outcome Measure Options AM-PAC 6 Clicks Daily Activity (OT)  -TS AM-PAC 6 Clicks Basic Mobility (PT)  -CW AM-PAC 6 Clicks Daily Activity (OT)  -AC (r) MK (t) AC (c)      02/03/18 1200          How much help from another person do you currently need...    Turning from your back to your side while in flat bed without using bedrails? 2  -TB      Moving from lying on back to sitting on the side of a flat bed without bedrails? 2  -TB      Moving to and from a bed to a chair (including a wheelchair)? 1  -TB      Standing up from a chair using your arms (e.g., wheelchair, bedside chair)? 2  -TB      Climbing 3-5 steps with a railing? 1  -TB      To walk in hospital room? 1  -TB      AM-PAC 6 Clicks Score 9  -TB      Functional Assessment    Outcome Measure Options AM-PAC 6 Clicks Basic Mobility (PT)  -TB        User Key  (r) = Recorded By, (t) = Taken By, (c) = Cosigned By    Initials Name Provider Type    TB Josias White, PT Physical Therapist    AC Дмитрий Jacobsen, OTR/L Occupational Therapist    TS Nallely Tripp RADER/L Occupational Therapy Assistant    MÓNICA Nash, PTA Physical Therapy Assistant    DIONNE Cavazos, OT Student OT Student           Time Calculation:         Time Calculation- OT       02/05/18 1418          Time Calculation- OT    OT Start Time 1333  -TS      OT Stop Time 1400  -TS      OT Time Calculation (min) 27 min  -TS      Total Timed Code Minutes- OT 27 minute(s)  -TS      OT Received On 02/05/18  -TS        User Key  (r) = Recorded By, (t) = Taken By, (c) = Cosigned By    Initials Name Provider Type    TS Nallely Tripp, RADER/RITA Barnard  Therapy Assistant           Therapy Charges for Today     Code Description Service Date Service Provider Modifiers Qty    [id number] HC OT SELF CARE/MGMT/TRAIN EA 15 MIN 2/5/2018 PRASANTH Valdez/L GO, KX 2          OT G-codes  OT Professional Judgement Used?: Yes  OT Functional Scales Options: AM-PAC 6 Clicks Daily Activity (OT)  Score: 15  Functional Limitation: Self care  Self Care Current Status (): At least 40 percent but less than 60 percent impaired, limited or restricted  Self Care Goal Status (): At least 20 percent but less than 40 percent impaired, limited or restricted    LÓPEZ Salguero  2/5/2018

## 2018-02-05 NOTE — PROGRESS NOTES
Continued Stay Note   [place]     Patient Name: Andres Walter  MRN: [id number]  Today's Date: 2/5/2018    Admit Date: 2/1/2018          Discharge Plan       02/05/18 1108    Case Management/Social Work Plan    Plan [facility] vs [facility]    Additional Comments LEANNE spoke to Jeanne in admissions at [facility] and Ariana at [facility]. [facility] has offered a bed. Ariana at [facility] to evaluate today. Will follow for [facility]'s decision.               Discharge Codes     None            REBECCA De JesusW

## 2018-02-05 NOTE — THERAPY TREATMENT NOTE
Acute Care - Physical Therapy Treatment Note  [facility]     Patient Name: Andres Walter  : 1929  MRN: [id number]  Today's Date: 2018  Onset of Illness/Injury or Date of Surgery Date: 18  Date of Referral to PT: 18  Referring Physician: Dr. Barger    Admit Date: 2018    Visit Dx:    ICD-10-CM ICD-9-CM   1. Fall, initial encounter W19.XXXA E888.9   2. Closed fracture of left hip, initial encounter S72.002A 820.8   3. Pneumonia of both lungs due to infectious organism, unspecified part of lung J18.9 483.8   4. Acute UTI N39.0 599.0   5. Abnormality of gait and mobility R26.9 781.2   6. Dysphagia, unspecified type R13.10 787.20   7. Impaired mobility and ADLs Z74.09 799.89   8. Impaired cognition R41.89 294.9     Patient Active Problem List   Diagnosis   • Fall               Adult Rehabilitation Note       18 1505 18 1333 18 1105    Rehab Assessment/Intervention    Discipline physical therapy assistant  -CW occupational therapy assistant  -TS physical therapy assistant  -CW    Document Type therapy note (daily note)  -CW therapy note (daily note)  -TS therapy note (daily note)  -CW    Subjective Information agree to therapy;complains of;weakness;fatigue;pain  -CW agree to therapy;complains of;pain  -TS agree to therapy;complains of;pain;fatigue  -CW    Patient Effort, Rehab Treatment fair  -CW good  -TS adequate  -CW    Precautions/Limitations fall precautions;hip precautions- left  -CW fall precautions;hip precautions- left  -TS fall precautions;hip precautions- left  -CW    Specific Treatment Considerations   Requires max encouragement to participate  -CW    Recorded by [CW] Samina Nash PTA [TS] PRASANTH Valdez/RITA [CW] Samina Nash PTA    Pain Assessment    Pain Assessment Tello-Cabral FACES  -CW 0-10  -TS Tello-Baker FACES  -CW    Tello-Baker FACES Pain Rating 4  -CW  10  -CW    Pain Score  4  -TS     Pain Type Acute pain;Surgical pain  -CW Acute  pain;Surgical pain  -TS Acute pain  -CW    Pain Location Hip  -CW Hip  -TS Hip  -CW    Pain Orientation Right;Left  -CW Right;Left  -TS Left  -CW    Pain Descriptors Aching  -CW Aching  -TS     Pain Frequency Intermittent  -CW Intermittent  -TS Intermittent   0/10 before movement  -CW    Pain Intervention(s) Repositioned  -CW Repositioned  -TS Repositioned;Medication (See MAR)  -CW    Response to Interventions tolerated  -CW pt states that her R hip has more pain than L hip  -TS tolerated  -CW    Recorded by [CW] Samina Nash PTA [TS] PRASANTH Valdez/RITA [CW] Samina Nash PTA    Bed Mobility, Assessment/Treatment    Bed Mobility, Assistive Device  bed rails;draw sheet  -TS draw sheet  -CW    Bed Mobility, Roll Left, [place]  minimum assist (75% patient effort);2 person assist required  -TS     Bed Mob, Supine to Sit, [place]   maximum assist (25% patient effort);2 person assist required;verbal cues required  -CW    Bed Mob, Sit to Supine, [place]   maximum assist (25% patient effort);2 person assist required;verbal cues required  -CW    Bed Mobility, Safety Issues   cognitive deficits limit understanding;decreased use of legs for bridging/pushing  -CW    Bed Mobility, Impairments   pain;strength decreased  -CW    Recorded by  [TS] PRASANTH Valdez/RITA [CW] Samina Nash PTA    Transfer Assessment/Treatment    Transfers, Sit-Stand [place]   moderate assist (50% patient effort);maximum assist (25% patient effort);2 person assist required;verbal cues required  -CW    Transfers, Stand-Sit [place]   moderate assist (50% patient effort);maximum assist (25% patient effort);2 person assist required;verbal cues required  -CW    Transfers, Sit-Stand-Sit, Assist Device   rolling walker  -CW    Transfer, Safety Issues   loses balance backward  -CW    Transfer, Impairments   strength decreased;impaired balance;pain  -CW    Transfer, Comment   Stood while nsg checked  skin and changed brief.  Leans heavily posterior  -CW    Recorded by   [CW] Samina Nash PTA    Lower Body Dressing Assessment/Training    LB Dressing Assess/Train, Clothing Type  doffing:   brief  -TS     LB Dressing Assess/Train, Position  supine  -TS     LB Dressing Assess/Train, [place]  maximum assist (25% patient effort)  -TS     LB Dressing Assess/Train, Impairments  pain;ROM decreased  -TS     Recorded by  [TS] LÓPEZ Valdez     Toileting Assessment/Training    Toileting Assess/Train, Position  supine   fowlers  -TS     Toileting Assess/Train, Indepen Level  supervision required;maximum assist (25% patient effort)  -TS     Toileting Assess/Train, Comment  S for hygiene to jacey area, max A for hygiene to buttocks  -TS     Recorded by  [TS] LÓPEZ Valdez     Motor Skills/Interventions    Additional Documentation   Balance Skills Training (Group)  -CW    Recorded by   [CW] Samina Nash PTA    Balance Skills Training    Sitting-Level of Assistance   Close supervision;Contact guard  -CW    Sitting-Balance Support   Feet supported  -CW    Standing-Level of Assistance   Maximum assistance;x2  -CW    Static Standing Balance Support   assistive device  -CW    Standing Balance # of Minutes   --   leans heavily posterior  -CW    Recorded by   [CW] Samina Nash PTA    Therapy Exercises    Right Lower Extremity AAROM:;AROM:;10 reps;supine  -CW      Left Lower Extremity PROM:;AAROM:;10 reps;supine  -CW      Bilateral Lower Extremities   AROM:;5 reps;sitting;ankle pumps/circles;hip flexion;LAQ  -CW    Recorded by [CW] Samina Nash PTA  [CW] Samina Nash PTA    Positioning and Restraints    Pre-Treatment Position in bed  -CW in bed  -TS in bed  -CW    Post Treatment Position bed  -CW bed  -TS bed  -CW    In Bed side lying left;call light within reach;encouraged to call for assist;exit alarm on;pillow between legs  -CW side lying left;call light within  reach;encouraged to call for assist;exit alarm on;side rails up x2;pillow between legs  -TS supine;call light within reach;encouraged to call for assist;ABD pillow;heels elevated  -CW    Recorded by [CW] Samina Nash PTA [TS] LÓPEZ Valdez [CW] Samina Nash PTA      02/04/18 1500 02/04/18 1342 02/04/18 0958    Rehab Assessment/Intervention    Discipline occupational therapy assistant  -CJ physical therapy assistant  -NW physical therapy assistant  -NW    Document Type therapy note (daily note)  -CJ therapy note (daily note)  -NW therapy note (daily note)  -NW    Subjective Information  complains of;pain;fatigue  -NW agree to therapy;complains of;pain  -NW    Patient Effort, Rehab Treatment  adequate  -NW adequate  -NW    Treatment Not Performed patient/family declined treatment  -CJ      Treatment Not Performed, Comment --   Pt. refused 2x, states that her son will be here shortly!  -      Precautions/Limitations  fall precautions;hip precautions- left  -NW fall precautions;hip precautions- left  -NW    Specific Treatment Considerations  encourged pt to work w/PT, but wanted to be left alone  -NW pt very anxious needs encouragement to work w/ PT, was more worried about her son coming and she wanted to see him,  -NW    Recorded by [CJ] PRASANTH Sanchez/RITA [NW] Jaclyn Castano PTA [NW] Jaclyn Castano PTA    Pain Assessment    Pain Assessment  Tello-Cabral FACES  -NW Tello-Baker FACES  -NW    Tello-Baker FACES Pain Rating  10  -NW 8  -NW    Pain Location  Hip  -NW Hip  -NW    Pain Orientation  Left  -NW Left  -NW    Pain Frequency  Constant/continuous  -NW Constant/continuous  -NW    Pain Intervention(s)  Medication (See MAR);Repositioned  -NW Repositioned;Medication (See MAR)  -NW    Recorded by  [NW] Jaclyn Castano PTA [NW] Jaclyn Castano PTA    Bed Mobility, Assessment/Treatment    Bed Mobility, Assistive Device  draw sheet  -NW draw sheet  -NW    Bed Mob, Supine to Sit,  "[place]  verbal cues required;maximum assist (25% patient effort);2 person assist required  -NW verbal cues required;moderate assist (50% patient effort);maximum assist (25% patient effort);2 person assist required  -NW    Bed Mob, Sit to Supine, [place]  verbal cues required;maximum assist (25% patient effort);2 person assist required  -NW verbal cues required;maximum assist (25% patient effort);2 person assist required  -NW    Bed Mobility, Safety Issues  cognitive deficits limit understanding  -NW cognitive deficits limit understanding;decreased use of arms for pushing/pulling;decreased use of legs for bridging/pushing  -NW    Bed Mobility, Impairments  pain;strength decreased  -NW strength decreased;pain  -NW    Recorded by  [NW] Jaclyn Castano PTA [NW] Jaclyn Castano PTA    Transfer Assessment/Treatment    Transfers, Sit-Stand [place]  verbal cues required;moderate assist (50% patient effort);maximum assist (25% patient effort);2 person assist required  -NW verbal cues required;moderate assist (50% patient effort);2 person assist required  -NW    Transfers, Stand-Sit [place]  verbal cues required;moderate assist (50% patient effort)  -NW verbal cues required;moderate assist (50% patient effort)  -NW    Transfers, Sit-Stand-Sit, Assist Device  rolling walker  -NW rolling walker  -NW    Transfer, Safety Issues   step length decreased;weight-shifting ability decreased  -NW    Transfer, Impairments   pain;strength decreased  -NW    Transfer, Comment  attempt to take sidesteps this pm but pt unable \"please just lay me down\" encouraged pt to keep trying but adamently refused so got pt laid bk down  -NW     Recorded by  [NW] Jaclyn Castano PTA [NW] Jaclyn Castano PTA    Gait Assessment/Treatment    Gait, [place] Level   moderate assist (50% patient effort);2 person assist required  -NW    Gait, Assistive Device   rolling walker  -NW    Gait, Distance (Feet)   --   few sidesteps towards " HOB  -NW    Gait, Gait Deviations   step length decreased;stride length decreased  -NW    Gait, Safety Issues   step length decreased;balance decreased during turns  -NW    Gait, Impairments   pain;strength decreased  -NW    Gait, Comment   pt very anxious needs lots of cues for safety  -NW    Recorded by   [NW] Jaclyn Castano PTA    Therapy Exercises    Left Lower Extremity   --   reviewed ex's but pt refused to follow through w/ them  -NW    Recorded by   [NW] Jaclyn Castano PTA    Positioning and Restraints    Pre-Treatment Position  in bed  -NW in bed  -NW    Post Treatment Position  bed  -NW bed  -NW    In Bed  fowlers;call light within reach;encouraged to call for assist;notified nsg  -NW fowlers;call light within reach;encouraged to call for assist;exit alarm on;notified nsg  -NW    Recorded by  [NW] Jaclyn Castano PTA [NW] Jaclyn Castano, PTA      02/03/18 1130          Swallow Assessment/Intervention    Additional Documentation Dysphagia/Swallow Intervention (Group)  -MB      Recorded by [MB] Vamsi Jacinto CCC-SLP      Dysphagia/Swallow Intervention    Dysphagia/Swallow Therapeutic Feed Pt will tolerate trials of current diet with no overt s/s of aspiration.  -MB      Recorded by [MB] Vamsi Jacinto CCC-SLP        User Key  (r) = Recorded By, (t) = Taken By, (c) = Cosigned By    Initials Name Effective Dates    JANETH Jacinto CCC-SLP 08/02/16 -     CJ Cristian Carias, RADER/L 08/02/16 -     TS Nallely Tripp, RADER/L 08/02/16 -     CW Samina Nash, [facility] 06/22/15 -     NW Jaclyn Castano, [facility] 08/02/16 -                 IP PT Goals       02/03/18 1229          Bed Mobility PT LTG    Bed Mobility PT LTG, Date Established 02/03/18  -TB      Bed Mobility PT LTG, Time to Achieve by discharge  -TB      Bed Mobility PT LTG, Activity Type all bed mobility  -TB      Bed Mobility PT LTG, [place] Level supervision required  -TB      Transfer Training PT LTG    Transfer Training PT  LTG, Date Established 02/03/18  -TB      Transfer Training PT LTG, Time to Achieve by discharge  -TB      Transfer Training PT LTG, Activity Type all transfers  -TB      Transfer Training PT LTG, [place] Level contact guard assist  -TB      Transfer Training PT LTG, Assist Device walker, rolling  -TB      Gait Training PT LTG    Gait Training Goal PT LTG, Date Established 02/03/18  -TB      Gait Training Goal PT LTG, Time to Achieve by discharge  -TB      Gait Training Goal PT LTG, [place] Level contact guard assist  -TB      Gait Training Goal PT LTG, Assist Device walker, rolling  -TB      Gait Training Goal PT LTG, Distance to Achieve 20  -TB      Gait Training Goal PT LTG, Additional Goal able to stand upright without losing balance posterior with min limping  -TB      Range of Motion PT LTG    Range of Motion Goal PT LTG, Date Established 02/03/18  -TB      Range of Motion Goal PT LTG, Time to Achieve by discharge  -TB      Range of Motion Goal PT LTG, PROM Measure benja ankle DF >0 deg  -TB        User Key  (r) = Recorded By, (t) = Taken By, (c) = Cosigned By    Initials Name Provider Type    TB Josias White, PT Physical Therapist          Physical Therapy Education     Title: PT OT SLP Therapies (Active)     Topic: Physical Therapy (Active)     Point: Mobility training (Active)    Learning Progress Summary    Learner Readiness Method Response Comment Documented by Status   Patient Acceptance E,D NR Bed mobility, transfers, plan of care, benefit of activity CW 02/05/18 1143 Active    Acceptance E VU,NR benefits of activity NW 02/04/18 1037 Done    Acceptance E NR  TB 02/03/18 1227 Active   Family Acceptance E NR  TB 02/03/18 1227 Active               Point: Home exercise program (Active)    Learning Progress Summary    Learner Readiness Method Response Comment Documented by Status   Patient Acceptance E,D NR Bed mobility, transfers, plan of care, benefit of activity CW 02/05/18 1143 Active     Acceptance E NR   02/03/18 1227 Active   Family Acceptance E NR   02/03/18 1227 Active               Point: Body mechanics (Active)    Learning Progress Summary    Learner Readiness Method Response Comment Documented by Status   Patient Acceptance E,D NR Bed mobility, transfers, plan of care, benefit of activity  02/05/18 1143 Active    Acceptance E NR   02/03/18 1227 Active   Family Acceptance E NR   02/03/18 1227 Active               Point: Precautions (Active)    Learning Progress Summary    Learner Readiness Method Response Comment Documented by Status   Patient Acceptance E,D NR Bed mobility, transfers, plan of care, benefit of activity  02/05/18 1143 Active    Acceptance E NR   02/03/18 1227 Active   Family Acceptance E NR   02/03/18 1227 Active                      User Key     Initials Effective Dates Name Provider Type Discipline     08/02/16 -  Josias White, PT Physical Therapist PT     06/22/15 -  Samina Nash, PTA Physical Therapy Assistant PT     08/02/16 -  Jaclyn Castano, PTA Physical Therapy Assistant PT                    PT Recommendation and Plan  Anticipated Equipment Needs At Discharge: gait belt, two wheeled walker  Anticipated Discharge Disposition: transitional care  Planned Therapy Interventions: strengthening, ROM (Range of Motion), balance training, bed mobility training, gait training, transfer training  PT Frequency: 2 times/day  Plan of Care Review  Plan Of Care Reviewed With: patient  Progress: progress towards functional goals is fair  Outcome Summary/Follow up Plan: Pt requires max encouragement to participate with therapy due to pain in L hip.  Max of 2 assist for bed mobility and sit to stand transfer.  Pt leans heavily posterior upon standing.  Performed active sitting exercises with BLE.  Sinan continue to benefit from therapy to increase strength and improve mobility.          Outcome Measures       02/05/18 1400 02/05/18 1100 02/03/18 1400    How  much help from another person do you currently need...    Turning from your back to your side while in flat bed without using bedrails?  2  -CW     Moving from lying on back to sitting on the side of a flat bed without bedrails?  2  -CW     Moving to and from a bed to a chair (including a wheelchair)?  1  -CW     Standing up from a chair using your arms (e.g., wheelchair, bedside chair)?  2  -CW     Climbing 3-5 steps with a railing?  1  -CW     To walk in hospital room?  1  -CW     AM-PAC 6 Clicks Score  9  -CW     How much help from another is currently needed...    Putting on and taking off regular lower body clothing? 2  -TS  2  -AC (r) MK (t) AC (c)    Bathing (including washing, rinsing, and drying) 2  -TS  2  -AC (r) MK (t) AC (c)    Toileting (which includes using toilet bed pan or urinal) 2  -TS  2  -AC (r) MK (t) AC (c)    Putting on and taking off regular upper body clothing 3  -TS  3  -AC (r) MK (t) AC (c)    Taking care of personal grooming (such as brushing teeth) 3  -TS  3  -AC (r) MK (t) AC (c)    Eating meals 4  -TS  3  -AC (r) MK (t) AC (c)    Score 16  -TS  15  -AC (r) MK (t)    Functional Assessment    Outcome Measure Options AM-PAC 6 Clicks Daily Activity (OT)  -TS AM-PAC 6 Clicks Basic Mobility (PT)  -CW AM-PAC 6 Clicks Daily Activity (OT)  -AC (r) MK (t) AC (c)      02/03/18 1200          How much help from another person do you currently need...    Turning from your back to your side while in flat bed without using bedrails? 2  -TB      Moving from lying on back to sitting on the side of a flat bed without bedrails? 2  -TB      Moving to and from a bed to a chair (including a wheelchair)? 1  -TB      Standing up from a chair using your arms (e.g., wheelchair, bedside chair)? 2  -TB      Climbing 3-5 steps with a railing? 1  -TB      To walk in hospital room? 1  -TB      AM-PAC 6 Clicks Score 9  -TB      Functional Assessment    Outcome Measure Options AM-PAC 6 Clicks Basic Mobility (PT)  -TB         User Key  (r) = Recorded By, (t) = Taken By, (c) = Cosigned By    Initials Name Provider Type    TB Josias White, PT Physical Therapist    AC Дмитрий Jacobsen, OTR/L Occupational Therapist    TS Nallely Tripp, RADER/L Occupational Therapy Assistant    CW Samina Nash, PTA Physical Therapy Assistant    DIONNE Cavazos, OT Student OT Student           Time Calculation:         PT Charges       02/05/18 1518 02/05/18 1146       Time Calculation    Start Time 1505  -CW 1100  -CW     Stop Time 1518  -CW 1131  -CW     Time Calculation (min) 13 min  -CW 31 min  -CW     PT Received On 02/05/18  -CW 02/05/18  -CW     PT Goal Re-Cert Due Date 02/13/18  -CW 02/13/18  -CW     Time Calculation- PT    Total Timed Code Minutes- PT 13 minute(s)  -CW 31 minute(s)  -CW       User Key  (r) = Recorded By, (t) = Taken By, (c) = Cosigned By    Initials Name Provider Type    CW Samina Nash PTA Physical Therapy Assistant          Therapy Charges for Today     Code Description Service Date Service Provider Modifiers Qty    58669477271 HC PT THERAPEUTIC ACT EA 15 MIN 2/5/2018 Samina Nash, PTA GP, KX 1    69600224992 HC PT THER PROC EA 15 MIN 2/5/2018 Samina Nash PTA GP, KX 1    16585207208 HC PT THER PROC EA 15 MIN 2/5/2018 Samina Nash, EAN GP, KX 1          PT G-Codes  Outcome Measure Options: AM-PAC 6 Clicks Daily Activity (OT)  Score: 9  Functional Limitation: Mobility: Walking and moving around  Mobility: Walking and Moving Around Current Status (): At least 60 percent but less than 80 percent impaired, limited or restricted  Mobility: Walking and Moving Around Goal Status (): At least 40 percent but less than 60 percent impaired, limited or restricted    Samina Nash PTA  2/5/2018

## 2018-02-05 NOTE — PLAN OF CARE
Problem: Fall Risk (Adult)  Goal: Absence of Falls  Outcome: Ongoing (interventions implemented as appropriate)      Problem: Pressure Ulcer Risk (To Scale) (Adult,Obstetrics,Pediatric)  Goal: Skin Integrity  Outcome: Ongoing (interventions implemented as appropriate)      Problem: Patient Care Overview (Adult)  Goal: Plan of Care Review  Outcome: Ongoing (interventions implemented as appropriate)   02/05/18 0324   Coping/Psychosocial Response Interventions   Plan Of Care Reviewed With patient   Patient Care Overview   Progress no change   Outcome Evaluation   Outcome Summary/Follow up Plan PT CO burning ain in her feet and heels. Heelbos place on prior shift, but cutting into skin dt edema, cut ends to relieve. Heels floated off bed. PRn pain pill tolerated. Lasix given prior shift, patient finally voiding adequately, no IV fluids. Pt alert and confused to siituation at times. Turn Q2, no new skin breakdown. Potasium low in yesterdays labs, not replaced.        Problem: Hip Replacement, Total (Adult)  Goal: Signs and Symptoms of Listed Potential Problems Will be Absent or Manageable (Hip Replacement, Total)  Outcome: Ongoing (interventions implemented as appropriate)      Problem: Fluid Volume Deficit (Adult)  Goal: Fluid/Electrolyte Balance  Outcome: Ongoing (interventions implemented as appropriate)    Goal: Comfort/Well Being  Outcome: Ongoing (interventions implemented as appropriate)

## 2018-02-05 NOTE — PLAN OF CARE
Problem: Patient Care Overview (Adult)  Goal: Plan of Care Review  Outcome: Ongoing (interventions implemented as appropriate)   02/05/18 1446   Coping/Psychosocial Response Interventions   Plan Of Care Reviewed With patient   Patient Care Overview   Progress no change  (Initial Eval )   Outcome Evaluation   Outcome Summary/Follow up Plan Cognitive Linguistic evaluation completed. Patient was alert but only oriented to person. Patient refused to complete standardized assessment therefore an informal cognitive evaluation was presented. Throughout assessment it was apparent the pt exhibited deficits with functional problem solving, attention, and orientation to place, time, and situation. When asked to complete memory tasks patient responded with discontent, stating that her memory was fine. Further investigation shows that patient does have impaired short term memory yet uses the discontentment and refusal to hide her impairment. Patient would benefit from ST services in order to target deficits previously mentioned.        Problem: Inpatient SLP  Goal: Cognitive-linguistic-Patient will improve Cognitive-linguistic skills to improve safety and safety awareness in environment  Outcome: Ongoing (interventions implemented as appropriate)   02/05/18 1446   Cognitive Linguistic- Improve Safety and Awareness   Cognitive Linguistic Improve Safety and Awareness- SLP, Date Established 02/05/18   Cognitive Linguistic Improve Safety and Awareness- SLP, Time to Achieve by discharge   Cognitive Linguistic Improve Safety and Awareness- SLP, Activity Level Patient will improve attention skills for increased safety in environment;Patient will improve orientation for increased safety in environment;Patient will improve memory skills for increased safety in environment;Patient will improve functional problem solving skills for increased safety in environment   Cognitive Linguistic Improve Safety and Awareness- SLP, Date Goal Reviewed  02/05/18   Cognitive Linguistic Improve Safety and Awareness- SLP, Outcome goal ongoing

## 2018-02-05 NOTE — PROGRESS NOTES
[facility]  BARBI Mckya M.D.  AMARIS Emmanuel      Internal Medicine Progress Note    2/5/2018   9:11 AM    Name:  Andres Walter  MRN:    [id number]     Acct:     [id number]   Room:  [address] [place] [address] Day: 4     Admit Date: 2/1/2018  2:22 PM  PCP: No Known Provider    Subjective:     C/C:   Chief Complaint   Patient presents with   • Fall   • Hip Pain       Interval History: Status:  Improved. Resting in bed. No family at bedside. Pain adequately controlled. Increased urine output. Slightly less edema. Patient more alert today. Counts stable.     Review of Systems   Constitution: Positive for weakness. Negative for chills, fever, malaise/fatigue and weight loss.   HENT: Negative for congestion, hoarse voice and nosebleeds.    Eyes: Negative for blurred vision and photophobia.   Cardiovascular: Positive for leg swelling. Negative for chest pain, dyspnea on exertion, near-syncope and orthopnea.   Respiratory: Negative for cough, shortness of breath and sputum production.    Endocrine: Negative for cold intolerance and polyuria.   Hematologic/Lymphatic: Negative for adenopathy and bleeding problem.   Skin: Negative for dry skin, itching and rash.   Musculoskeletal: Positive for joint swelling and myalgias. Negative for arthritis, back pain and joint pain.   Gastrointestinal: Negative for abdominal pain, diarrhea, heartburn, nausea and vomiting.   Genitourinary: Negative for dysuria and flank pain.   Neurological: Negative for dizziness, headaches, paresthesias and seizures.   Psychiatric/Behavioral: Negative for altered mental status and memory loss. The patient is not nervous/anxious.    Allergic/Immunologic: Negative for environmental allergies and hives.         Medications:     Allergies: No Known Allergies    Current Meds:   Current Facility-Administered Medications:   •  bisoprolol (ZEBeta) 5 mg, hydrochlorothiazide (HYDRODIURIL) 6.25 mg for Ziac 5-6.25, , Oral, Q24H, Som  Sulaiman Roach MD  •  cefTRIAXone (ROCEPHIN) 1 g/10mL IV PUSH syringe, 1 g, Intravenous, Q24H, Som Roach MD, 1 g at 02/04/18 1820  •  dorzolamide (TRUSOPT) 2 % ophthalmic solution 1 drop, 1 drop, Both Eyes, TID, Som Roach MD, 1 drop at 02/05/18 0836  •  enoxaparin (LOVENOX) syringe 30 mg, 30 mg, Subcutaneous, Q24H, Emory Barger MD, 30 mg at 02/05/18 0836  •  ferrous sulfate tablet 325 mg, 325 mg, Oral, BID With Meals, Melisa Motley, APRN, 325 mg at 02/05/18 0836  •  furosemide (LASIX) injection 20 mg, 20 mg, Intravenous, Daily, Som Roach MD, 20 mg at 02/05/18 0837  •  HYDROcodone-acetaminophen (NORCO)  MG per tablet 1 tablet, 1 tablet, Oral, Q4H PRN, Emory Barger MD  •  HYDROcodone-acetaminophen (NORCO) 5-325 MG per tablet 1 tablet, 1 tablet, Oral, Q4H PRN, Emory Barger MD, 1 tablet at 02/04/18 2220  •  Influenza Vac Subunit Quad (FLUCELVAX) injection 0.5 mL, 0.5 mL, Intramuscular, During Hospitalization, Som Roach MD  •  levothyroxine (SYNTHROID, LEVOTHROID) tablet 100 mcg, 100 mcg, Oral, Q AM, Som Roach MD, 100 mcg at 02/05/18 0646  •  Morphine sulfate (PF) injection 1 mg, 1 mg, Intravenous, Q4H PRN **AND** naloxone (NARCAN) injection 0.4 mg, 0.4 mg, Intravenous, Q5 Min PRN, Emory Barger MD  •  Morphine sulfate (PF) injection 4 mg, 4 mg, Intravenous, Q4H PRN **AND** naloxone (NARCAN) injection 0.4 mg, 0.4 mg, Intravenous, Q5 Min PRN, Som Roach MD  •  ondansetron (ZOFRAN) injection 4 mg, 4 mg, Intravenous, Q6H PRN, Som Roach MD  •  ondansetron (ZOFRAN) injection 4 mg, 4 mg, Intravenous, Q6H PRN, Emory Barger MD  •  ondansetron (ZOFRAN) tablet 4 mg, 4 mg, Oral, Q6H PRN, Emory Barger MD  •  pneumococcal polysaccharide 23-valent (PNEUMOVAX-23) vaccine 0.5 mL, 0.5 mL, Intramuscular, During Hospitalization, Som Roach MD  •  potassium chloride (MICRO-K) CR capsule 20 mEq, 20 mEq, Oral, Once, AMARIS Rodriguez  •   "sodium chloride 0.9 % flush 1-10 mL, 1-10 mL, Intravenous, PRN, Som Roach MD  •  Insert peripheral IV, , , Once **AND** sodium chloride 0.9 % flush 10 mL, 10 mL, Intravenous, PRN, Gabriela Barrett MD  •  tobramycin 0.3 % ophthalmic solution 1 drop, 1 drop, Left Eye, TID, Som Roach MD, 1 drop at 18 0836    Data:     Code Status:  Full Code    History reviewed. No pertinent family history.    Social History     Social History   • Marital status:      Spouse name: N/A   • Number of children: N/A   • Years of education: N/A     Occupational History   • Not on file.     Social History Main Topics   • Smoking status: Never Smoker   • Smokeless tobacco: Never Used   • Alcohol use No   • Drug use: No   • Sexual activity: Defer     Other Topics Concern   • Not on file     Social History Narrative       Vitals:  /49 (BP Location: Right arm, Patient Position: Lying)  Pulse 71  Temp 99.3 °F (37.4 °C) (Oral)   Resp 16  Ht 157.5 cm (62\")  Wt 45.4 kg (100 lb)  SpO2 96%  BMI 18.29 kg/m2  Temp (24hrs), Av.3 °F (37.4 °C), Min:98 °F (36.7 °C), Max:100.1 °F (37.8 °C)            I/O (24Hr):    Intake/Output Summary (Last 24 hours) at 18 0911  Last data filed at 18 1820   Gross per 24 hour   Intake              410 ml   Output              300 ml   Net              110 ml       Labs and imaging:      Lab Results (last 24 hours)     Procedure Component Value Units Date/Time    Blood Culture - Blood, [[id number]]  (Normal) Collected:  18 1708    Specimen:  Blood from Arm, Right Updated:  18 173     Blood Culture No growth at 3 days    Blood Culture - Blood, [[id number]]  (Normal) Collected:  18 1713    Specimen:  Blood from Arm, Left Updated:  18 173     Blood Culture No growth at 3 days    Basic Metabolic Panel [[id number]]  (Abnormal) Collected:  18 0531    Specimen:  Blood Updated:  18 0705     Glucose 98 mg/dL      BUN 24 (H) mg/dL      " Creatinine 1.09 mg/dL      Sodium 137 mmol/L      Potassium 3.4 (L) mmol/L      Chloride 100 mmol/L      CO2 30.0 mmol/L      Calcium 7.8 (L) mg/dL      eGFR Non African Amer 47 (L) mL/min/1.73      BUN/Creatinine Ratio 22.0     Anion Gap 7.0 mmol/L     Narrative:       The MDRD GFR formula is only valid for adults with stable renal function between ages 18 and 70.    CBC (No Diff) [[id number]]  (Abnormal) Collected:  02/05/18 0531    Specimen:  Blood Updated:  02/05/18 0708     WBC 10.77 10*3/mm3      RBC 2.80 (L) 10*6/mm3      Hemoglobin 8.9 (L) g/dL      Hematocrit 26.8 (L) %      MCV 95.7 fL      MCH 31.8 pg      MCHC 33.2 g/dL      RDW 13.9 %      RDW-SD 48.7 fl      MPV 9.6 fL      Platelets 355 10*3/mm3             Xr Ankle 2 View Left    Result Date: 2/3/2018  Narrative: EXAMINATION: XR ANKLE 2 VW LEFT- 2/3/2018 2:46 PM CST  HISTORY: Pain  Comparison: None  Findings: There is normal bony alignment at the ankle without evidence of acute fracture or dislocation. There is somewhat diffuse soft tissue edema and skin thickening. No lytic or sclerotic lesions are appreciated. Vascular calcifications are present.      Impression: Impression: No acute bony abnormality. This report was finalized on 02/03/2018 14:47 by Dr. Cody Stanley MD.    Xr Ankle 2 View Right    Result Date: 2/3/2018  Narrative: EXAMINATION: XR ANKLE 2 VW RIGHT- 2/3/2018 2:54 PM CST  HISTORY: Pain  Comparison: None  Findings: There is normal bony alignment at the ankle without evidence of acute fracture or dislocation. The ankle mortise appears intact. No lytic or sclerotic lesions are appreciated. Vascular calcifications are noted. There appears to be diffuse soft tissue edema.      Impression: Impression: No acute bony abnormality. This report was finalized on 02/03/2018 14:55 by Dr. Cody Stanley MD.    Xr Foot 2 View Left    Result Date: 2/3/2018  Narrative: EXAMINATION: XR FOOT 2 VW LEFT- 2/3/2018 2:49 PM CST  HISTORY: Pain  Comparison:  None  Findings: A hallux valgus deformity is present. Degenerative changes are seen at the first metatarsophalangeal joint. Degenerative changes are seen throughout the proximal interphalangeal joints of the toes. No acute fracture is appreciated. When allowing for limitations of nonweight bearing images, the Lisfranc joint appears grossly normal. An oblique view of the foot is not submitted. Degenerative plantar calcaneal spurring is noted. Vascular calcifications are present.      Impression: Impression: Hallux valgus deformity with degenerative changes of the foot but no appreciable acute bony abnormality. This report was finalized on 02/03/2018 14:51 by Dr. Cody Stanley MD.    Xr Foot 2 View Right    Result Date: 2/3/2018  Narrative: EXAMINATION: XR FOOT 2 VW RIGHT- 2/3/2018 2:56 PM CST  HISTORY: Pain  Comparison: None  Findings: Hallux valgus deformity is noted, with minimal lateral subluxation of the proximal phalanx of the great toe relative to the first metatarsal. Extensive subchondral cystic changes are noted of the distal first metatarsal. Degenerative changes are seen at the first metatarsophalangeal joint. Allowing for limitations of nonweight bearing images, the Lisfranc joint appears grossly normal. Degenerative changes are seen at the proximal interphalangeal joints throughout the toes. The base of the fifth metatarsal appears intact. An oblique view is not submitted for review. Vascular calcifications are present. There appears to be diffuse skin thickening and/or edema.      Impression: Impression: Degenerative changes of the foot without appreciable acute bony abnormality. This report was finalized on 02/03/2018 14:58 by Dr. Cody Stanley MD.    Ct Head Without Contrast    Result Date: 2/1/2018  Narrative: CT HEAD WO CONTRAST- 2/1/2018 4:24 PM EST  HISTORY: fall ; altered mental status  COMPARISON: 12/28/2014  DOSE LENGTH PRODUCT: 1042 mGy cm. Automated exposure control was also utilized to  decrease patient radiation dose.  TECHNIQUE: Axial images of brain obtained without IV contrast.  FINDINGS:  There is cortical atrophy, greatest within the frontal region. No intracranial hemorrhage or mass effect is identified. No acute signs of ischemia. There is no extra-axial hematoma or subarachnoid hemorrhage.  There is near complete opacification of the bilateral sphenoid sinuses. Sphenoid opacification seen on the 2014 exam. There is mild mucosal thickening of the ethmoid sinuses. The mastoid air cells are well-aerated. The bony calvarium appears intact.      Impression: 1. Mild to moderate cortical atrophy, greatest within the frontal region. No acute intracranial abnormality. 2. Persistent chronic sphenoid opacification, chronic sinusitis. This report was finalized on 02/01/2018 15:44 by Dr. Shasta Crawford MD.    Xr Chest 1 View    Result Date: 2/1/2018  Narrative: EXAMINATION: XR CHEST 1 VW- 2/1/2018 16:12 CST  HISTORY: Fall, pain  Comparison: 12/20/2014  Findings: Heart and mediastinal contours appear normal. Increased interstitial markings are seen bilaterally with scattered patchy opacities. There is biapical pleural thickening. No pneumothorax is appreciated. There is no appreciable pleural effusion. The pulmonary vasculature appears normal.      Impression: Impression: Patchy opacities bilaterally are concerning for an infectious or inflammatory process. This report was finalized on 02/01/2018 16:13 by Dr. Cody Stanley MD.    Xr Hip With Or Without Pelvis 2 - 3 View Left    Result Date: 2/1/2018  Narrative: HISTORY: Fall with pain  Left hip: Frontal view the pelvis performed with frontal and cross table lateral views left hip.  There is a left subcapital femoral neck fracture with prominent impaction and superior displacement of the femoral neck in reference to the femoral head. No additional fracture is identified. Coarse pelvic calcification may represent a uterine leiomyoma. There are vascular  calcifications.      Impression: 1. Impacted left subcapital femoral neck fracture. This report was finalized on 02/01/2018 16:13 by Dr. Shasta Crawford MD.    Physical Examination:        Physical Exam   Constitutional: She is oriented to person, place, and time. She appears well-developed and well-nourished.   HENT:   Head: Normocephalic and atraumatic.   Mouth/Throat: Oropharynx is clear and moist.   Eyes: Conjunctivae and EOM are normal. Pupils are equal, round, and reactive to light.   Neck: Normal range of motion. Neck supple.   Cardiovascular: Normal rate, regular rhythm, normal heart sounds and intact distal pulses.    Pulmonary/Chest: Effort normal and breath sounds normal.   Abdominal: Soft. Bowel sounds are normal. She exhibits no distension.   Musculoskeletal: Normal range of motion. She exhibits edema (2+ BLE) and tenderness (pain with rom of bilateral lower extremeties).   Neurological: She is alert and oriented to person, place, and time. No cranial nerve deficit.   Skin: Skin is warm and dry.   Dressing c.d.i   Psychiatric: She has a normal mood and affect. Her behavior is normal. Thought content normal.   Nursing note and vitals reviewed.      Assessment:             Active Problems:    Fall    Past Medical History:   Diagnosis Date   • Arthritis    • Encephalopathy    • Hypertension         Plan:        1. Impacted left subcapital femur fracture s/p fall  2. UTI - e. Coli - present on admission  3. HTN  4. Encephalopathy - improved  5. Iron deficiency anemia  6. Hypothyroidism  7. HLD  8. Hypokalemia    Continue current treatment. Monitor counts. Increase activity. Continue pain control efforts. Continue antibiotics. Replace and recheck potassium. Maintain patient safety. Discharge planning.       Electronically signed by AMARIS Rodriguez on 2/5/2018 at 9:11 AM   I have discussed the care of Andres Walter, including pertinent history and exam findings, with the nurse practitioner.    I  have seen and examined the patient and the key elements of all parts of the encounter have been performed by me.  I agree with the assessment, plan and orders as documented by Melisa GLEZ, after I modified the exam findings and the plan of treatments and the final version is my approved version of the assessment.        Electronically signed by Som Roach MD on 2/5/2018 at 12:13 PM

## 2018-02-06 LAB
ANION GAP SERPL CALCULATED.3IONS-SCNC: 6 MMOL/L (ref 4–13)
BACTERIA SPEC AEROBE CULT: NORMAL
BACTERIA SPEC AEROBE CULT: NORMAL
BUN BLD-MCNC: 23 MG/DL (ref 5–21)
BUN/CREAT SERPL: 24.7 (ref 7–25)
CALCIUM SPEC-SCNC: 8 MG/DL (ref 8.4–10.4)
CHLORIDE SERPL-SCNC: 99 MMOL/L (ref 98–110)
CO2 SERPL-SCNC: 32 MMOL/L (ref 24–31)
CREAT BLD-MCNC: 0.93 MG/DL (ref 0.5–1.4)
DEPRECATED RDW RBC AUTO: 50.4 FL (ref 40–54)
ERYTHROCYTE [DISTWIDTH] IN BLOOD BY AUTOMATED COUNT: 14.3 % (ref 12–15)
GFR SERPL CREATININE-BSD FRML MDRD: 57 ML/MIN/1.73
GLUCOSE BLD-MCNC: 101 MG/DL (ref 70–100)
HCT VFR BLD AUTO: 27.9 % (ref 37–47)
HGB BLD-MCNC: 9 G/DL (ref 12–16)
MAGNESIUM SERPL-MCNC: 1.9 MG/DL (ref 1.4–2.2)
MCH RBC QN AUTO: 31.3 PG (ref 28–32)
MCHC RBC AUTO-ENTMCNC: 32.3 G/DL (ref 33–36)
MCV RBC AUTO: 96.9 FL (ref 82–98)
PLATELET # BLD AUTO: 369 10*3/MM3 (ref 130–400)
PMV BLD AUTO: 9.6 FL (ref 6–12)
POTASSIUM BLD-SCNC: 3.7 MMOL/L (ref 3.5–5.3)
RBC # BLD AUTO: 2.88 10*6/MM3 (ref 4.2–5.4)
SODIUM BLD-SCNC: 137 MMOL/L (ref 135–145)
WBC NRBC COR # BLD: 9.65 10*3/MM3 (ref 4.8–10.8)

## 2018-02-06 PROCEDURE — 25010000002 PNEUMOCOCCAL VAC POLYVALENT PER 0.5 ML: Performed by: INTERNAL MEDICINE

## 2018-02-06 PROCEDURE — 25010000002 INFLUENZA VAC SUBUNIT QUAD 0.5 ML SUSPENSION PREFILLED SYRINGE: Performed by: INTERNAL MEDICINE

## 2018-02-06 PROCEDURE — 80048 BASIC METABOLIC PNL TOTAL CA: CPT | Performed by: INTERNAL MEDICINE

## 2018-02-06 PROCEDURE — 90661 CCIIV3 VAC ABX FR 0.5 ML IM: CPT | Performed by: INTERNAL MEDICINE

## 2018-02-06 PROCEDURE — G0009 ADMIN PNEUMOCOCCAL VACCINE: HCPCS | Performed by: INTERNAL MEDICINE

## 2018-02-06 PROCEDURE — 25010000002 FUROSEMIDE PER 20 MG: Performed by: INTERNAL MEDICINE

## 2018-02-06 PROCEDURE — 25010000002 CEFTRIAXONE PER 250 MG: Performed by: INTERNAL MEDICINE

## 2018-02-06 PROCEDURE — 83735 ASSAY OF MAGNESIUM: CPT | Performed by: NURSE PRACTITIONER

## 2018-02-06 PROCEDURE — 90732 PPSV23 VACC 2 YRS+ SUBQ/IM: CPT | Performed by: INTERNAL MEDICINE

## 2018-02-06 PROCEDURE — 85027 COMPLETE CBC AUTOMATED: CPT | Performed by: INTERNAL MEDICINE

## 2018-02-06 PROCEDURE — 92507 TX SP LANG VOICE COMM INDIV: CPT

## 2018-02-06 PROCEDURE — 25010000002 ENOXAPARIN PER 10 MG: Performed by: ORTHOPAEDIC SURGERY

## 2018-02-06 PROCEDURE — 97530 THERAPEUTIC ACTIVITIES: CPT

## 2018-02-06 PROCEDURE — G0008 ADMIN INFLUENZA VIRUS VAC: HCPCS | Performed by: INTERNAL MEDICINE

## 2018-02-06 PROCEDURE — 97110 THERAPEUTIC EXERCISES: CPT

## 2018-02-06 PROCEDURE — 92526 ORAL FUNCTION THERAPY: CPT

## 2018-02-06 RX ORDER — FERROUS SULFATE 325(65) MG
325 TABLET ORAL 2 TIMES DAILY WITH MEALS
Start: 2018-02-06 | End: 2020-08-17 | Stop reason: HOSPADM

## 2018-02-06 RX ORDER — ONDANSETRON 4 MG/1
4 TABLET, FILM COATED ORAL EVERY 6 HOURS PRN
Status: ON HOLD
Start: 2018-02-06 | End: 2020-08-11

## 2018-02-06 RX ORDER — HYDROCODONE BITARTRATE AND ACETAMINOPHEN 10; 325 MG/1; MG/1
1 TABLET ORAL EVERY 4 HOURS PRN
Start: 2018-02-06 | End: 2018-02-12

## 2018-02-06 RX ORDER — PSEUDOEPHEDRINE HCL 30 MG
100 TABLET ORAL 2 TIMES DAILY
Status: ON HOLD
Start: 2018-02-06 | End: 2020-08-10 | Stop reason: DRUGHIGH

## 2018-02-06 RX ADMIN — BISOPROLOL FUMARATE: 5 TABLET ORAL at 09:01

## 2018-02-06 RX ADMIN — DORZOLAMIDE HYDROCHLORIDE 1 DROP: 20 SOLUTION/ DROPS OPHTHALMIC at 09:02

## 2018-02-06 RX ADMIN — HYDROCODONE BITARTRATE AND ACETAMINOPHEN 1 TABLET: 5; 325 TABLET ORAL at 09:24

## 2018-02-06 RX ADMIN — DORZOLAMIDE HYDROCHLORIDE 1 DROP: 20 SOLUTION/ DROPS OPHTHALMIC at 20:06

## 2018-02-06 RX ADMIN — LACTULOSE 20 G: 20 SOLUTION ORAL at 09:02

## 2018-02-06 RX ADMIN — DOCUSATE SODIUM 100 MG: 100 CAPSULE ORAL at 09:02

## 2018-02-06 RX ADMIN — TOBRAMYCIN 1 DROP: 3 SOLUTION OPHTHALMIC at 09:03

## 2018-02-06 RX ADMIN — DOCUSATE SODIUM 100 MG: 100 CAPSULE ORAL at 20:06

## 2018-02-06 RX ADMIN — TOBRAMYCIN 1 DROP: 3 SOLUTION OPHTHALMIC at 20:06

## 2018-02-06 RX ADMIN — HYDROCODONE BITARTRATE AND ACETAMINOPHEN 1 TABLET: 5; 325 TABLET ORAL at 16:33

## 2018-02-06 RX ADMIN — LEVOTHYROXINE SODIUM 100 MCG: 100 TABLET ORAL at 05:01

## 2018-02-06 RX ADMIN — PNEUMOCOCCAL VACCINE POLYVALENT 0.5 ML
25; 25; 25; 25; 25; 25; 25; 25; 25; 25; 25; 25; 25; 25; 25; 25; 25; 25; 25; 25; 25; 25; 25 INJECTION, SOLUTION INTRAMUSCULAR; SUBCUTANEOUS at 12:39

## 2018-02-06 RX ADMIN — FERROUS SULFATE TAB 325 MG (65 MG ELEMENTAL FE) 325 MG: 325 (65 FE) TAB at 18:57

## 2018-02-06 RX ADMIN — TOBRAMYCIN 1 DROP: 3 SOLUTION OPHTHALMIC at 16:33

## 2018-02-06 RX ADMIN — DORZOLAMIDE HYDROCHLORIDE 1 DROP: 20 SOLUTION/ DROPS OPHTHALMIC at 16:33

## 2018-02-06 RX ADMIN — CEFTRIAXONE SODIUM 1 G: 1 INJECTION, POWDER, FOR SOLUTION INTRAMUSCULAR; INTRAVENOUS at 18:57

## 2018-02-06 RX ADMIN — A/SINGAPORE/GP1908/2015 IVR-180 (H1N1) (AN A/MICHIGAN/45/2015-LIKE VIRUS), A/SINGAPORE/GP2050/2015 (H3N2) (AN A/HONG KONG/4801/2014 - LIKE VIRUS), B/UTAH/9/2014 (A B/PHUKET/3073/2013-LIKE VIRUS), B/HONG KONG/259/2010 (A B/BRISBANE/60/08-LIKE VIRUS) 0.5 ML: 15; 15; 15; 15 INJECTION, SUSPENSION INTRAMUSCULAR at 12:38

## 2018-02-06 RX ADMIN — FUROSEMIDE 20 MG: 10 INJECTION, SOLUTION INTRAVENOUS at 09:02

## 2018-02-06 RX ADMIN — FERROUS SULFATE TAB 325 MG (65 MG ELEMENTAL FE) 325 MG: 325 (65 FE) TAB at 09:02

## 2018-02-06 RX ADMIN — ENOXAPARIN SODIUM 30 MG: 30 INJECTION SUBCUTANEOUS at 09:02

## 2018-02-06 NOTE — PROGRESS NOTES
Continued Stay Note   [place]     Patient Name: Andres Walter  MRN: [id number]  Today's Date: 2/6/2018    Admit Date: 2/1/2018          Discharge Plan       02/06/18 1028    Case Management/Social Work Plan    Plan [facility]    Final Note    Final Note Melisa with Dr. Roach plans to discharge patient to [facility]. LEANNE called Ariana in admissions at [facility] to notify and Ariana confirmed patient can admit today. Discharge summary, discharge med list, and a copy of scripts to be faxed to facility at [phone]. Patient's nurse will call report to [phone]. Plan EMS transport.               Discharge Codes       02/06/18 1027    Discharge Codes    Discharge Codes 03  Discharged/transferred to skilled nursing facility (SNF) with Medicare certification in anticipation of skilled care            RAMSES De Jesus

## 2018-02-06 NOTE — PLAN OF CARE
Problem: Patient Care Overview (Adult)  Goal: Plan of Care Review  Outcome: Ongoing (interventions implemented as appropriate)   02/06/18 1021   Coping/Psychosocial Response Interventions   Plan Of Care Reviewed With patient   Patient Care Overview   Progress progress towards functional goals is fair   Outcome Evaluation   Outcome Summary/Follow up Plan Pt requires max assist of 2 for bed mobility, transfers and gait. Pt has a hard posterior lean when standing and slides her feet forward while leaning back when trying to ambulate. Pt will need continued therapy from SNF at discharge.

## 2018-02-06 NOTE — PLAN OF CARE
Problem: Fall Risk (Adult)  Goal: Absence of Falls  Outcome: Ongoing (interventions implemented as appropriate)      Problem: Pressure Ulcer Risk (To Scale) (Adult,Obstetrics,Pediatric)  Goal: Skin Integrity  Outcome: Ongoing (interventions implemented as appropriate)      Problem: Patient Care Overview (Adult)  Goal: Plan of Care Review  Outcome: Ongoing (interventions implemented as appropriate)   02/06/18 0301   Coping/Psychosocial Response Interventions   Plan Of Care Reviewed With patient   Patient Care Overview   Progress no change   Outcome Evaluation   Outcome Summary/Follow up Plan Patient rested well through the night. c/o pain with movement. dressing to left hip CDI, PPP, VSS, safety maintained.       Problem: Hip Replacement, Total (Adult)  Goal: Signs and Symptoms of Listed Potential Problems Will be Absent or Manageable (Hip Replacement, Total)  Outcome: Ongoing (interventions implemented as appropriate)      Problem: Fluid Volume Deficit (Adult)  Goal: Fluid/Electrolyte Balance  Outcome: Ongoing (interventions implemented as appropriate)    Goal: Comfort/Well Being  Outcome: Ongoing (interventions implemented as appropriate)

## 2018-02-06 NOTE — THERAPY TREATMENT NOTE
Acute Care - Physical Therapy Treatment Note  [facility]     Patient Name: Andres Walter  : 1929  MRN: [id number]  Today's Date: 2018  Onset of Illness/Injury or Date of Surgery Date: 18  Date of Referral to PT: 18  Referring Physician: Dr. Barger    Admit Date: 2018    Visit Dx:    ICD-10-CM ICD-9-CM   1. Fall, initial encounter W19.XXXA E888.9   2. Closed fracture of left hip, initial encounter S72.002A 820.8   3. Pneumonia of both lungs due to infectious organism, unspecified part of lung J18.9 483.8   4. Acute UTI N39.0 599.0   5. Abnormality of gait and mobility R26.9 781.2   6. Dysphagia, unspecified type R13.10 787.20   7. Impaired mobility and ADLs Z74.09 799.89   8. Impaired cognition R41.89 294.9     Patient Active Problem List   Diagnosis   • Fall               Adult Rehabilitation Note       18 1405 18 1140 18 1034    Rehab Assessment/Intervention    Discipline physical therapy assistant  -CW physical therapy assistant  -CW occupational therapy assistant  -TS    Document Type therapy note (daily note)  -CW therapy note (daily note)  -CW --  -TS    Subjective Information agree to therapy;complains of;pain;fatigue;weakness  -CW agree to therapy;complains of;pain  -CW     Patient Effort, Rehab Treatment fair  -CW fair  -CW     Precautions/Limitations fall precautions;hip precautions- left  -CW fall precautions;hip precautions- left  -CW     Recorded by [CW] Samina Nash PTA [CW] Samina Nash PTA [TS] PRASANTH Valdez/L    Pain Assessment    Pain Assessment Tello-Cabral FACES  -CW Tello-Baker FACES  -CW     Tello-Baker FACES Pain Rating 6  -CW 4  -CW     Pain Type Acute pain;Surgical pain  -CW Acute pain;Surgical pain  -CW     Pain Location Hip  -CW Hip  -CW     Pain Orientation Left  -CW Left  -CW     Pain Frequency Intermittent  -CW Intermittent  -CW     Pain Intervention(s)  Repositioned  -CW     Response to Interventions  tolerated  -CW      Recorded by [CW] Samina Nash PTA [CW] Samina Nash PTA     Bed Mobility, Assessment/Treatment    Bed Mob, Sit to Supine, [place]  maximum assist (25% patient effort);2 person assist required;verbal cues required  -CW     Bed Mobility, Safety Issues  cognitive deficits limit understanding  -CW     Bed Mobility, Impairments  strength decreased;pain  -CW     Recorded by  [CW] Samina Nash PTA     Transfer Assessment/Treatment    Transfers, Chair-Bed [place]  maximum assist (25% patient effort);2 person assist required;verbal cues required;hand held assist  -CW     Transfers, Sit-Stand [place]  maximum assist (25% patient effort);2 person assist required;hand held assist  -CW     Transfers, Stand-Sit [place]  maximum assist (25% patient effort);2 person assist required  -CW     Transfer, Safety Issues  loses balance backward  -CW     Transfer, Impairments  strength decreased;postural control impaired;pain  -CW     Transfer, Comment  Stand pivot chair to bed - hard posterior lean  -CW     Recorded by  [CW] Samina Nash PTA     Therapy Exercises    Right Lower Extremity AROM:;AAROM:;15 reps;supine  -CW      Left Lower Extremity AAROM:;15 reps;supine  -CW      Recorded by [CW] Samina Nash PTA      Positioning and Restraints    Pre-Treatment Position in bed  -CW sitting in chair/recliner  -CW     Post Treatment Position bed  -CW bed  -CW     In Bed fowlers;call light within reach;encouraged to call for assist;exit alarm on;pillow between legs  -CW notified nsg;supine;call light within reach;exit alarm on;pillow between legs  -CW     Recorded by [CW] Samina Nash PTA [CW] Samina Nash PTA       02/06/18 0939 02/06/18 0900 02/05/18 1505    Rehab Assessment/Intervention    Discipline speech language pathologist  -MB physical therapy assistant  -MÓNICA physical therapy assistant  -MÓNICA    Document Type therapy note (daily note)  -MB therapy note (daily note)  -MÓNICA  "therapy note (daily note)  -CW    Subjective Information agree to therapy  -MB agree to therapy;complains of;pain;fatigue;swelling   B feet swollen  -CW agree to therapy;complains of;weakness;fatigue;pain  -CW    Patient Effort, Rehab Treatment adequate  -MB fair  -CW fair  -CW    Precautions/Limitations  fall precautions;hip precautions- left  -CW fall precautions;hip precautions- left  -CW    Recorded by [MB] Vamsi Jacinto CCC-SLP [CW] Samina Nash PTA [CW] Samina Nash PTA    Pain Assessment    Pain Assessment  Tello-Cabral FACES  -CW Tello-Baker FACES  -CW    Etllo-Baker FACES Pain Rating  4  -CW 4  -CW    Pain Type  Acute pain;Surgical pain  -CW Acute pain;Surgical pain  -CW    Pain Location  Hip  -CW Hip  -CW    Pain Orientation  Left  -CW Right;Left  -CW    Pain Descriptors  Aching  -CW Aching  -CW    Pain Frequency  Intermittent  -CW Intermittent  -CW    Pain Intervention(s)  Repositioned  -CW Repositioned  -CW    Response to Interventions  tolerated  -CW tolerated  -CW    Multiple Pain Sites  Yes  -CW     Recorded by  [CW] Samina Nash PTA [CW] Samina Nash PTA    Pain 2    Tello-Baker FACES Pain Rating 2  6  -CW     Pain Type 2  Acute pain  -CW     Pain Location 2  Foot  -CW     Pain Orientation 2  Right;Left  -CW     Pain Descriptors 2  Tender   \"hurts\"  -CW     Pain Frequency 2  Constant/continuous  -CW     Pain Intervention(s) 2  Repositioned  -CW     Response to Interventions 2  tolerated  -CW     Recorded by  [CW] Samina Nash PTA     Cognitive Assessment/Intervention    Current Cognitive/Communication Assessment  impaired  -CW     Recorded by  [CW] Samina Nash PTA     Improve memory skills    Memory Skills Progress 40%;without cues  -MB      Comments: Improve memory skills Pt was able to tell SLP some of the things that were on her breakfast tray. She repeated herself 3x about getting one Honey Nut Cheerio and one regular Cheerio package. She appeared to " have no memory that she had already told the therapist that information each time.   -MB      Recorded by [MB] Vamsi Jacinto CCC-SLP      Dysphagia/Swallow Intervention    Dysphagia/Swallow Therapeutic Feed Pt completed trials of thin water via cup with no overt s/s of aspiration. Pt had complaints about food, so SLP had an extensive conversation about pt preferences. She appeared generally displeased, stating that everything she has received so far has not been good. SLP notified CTY person for 3A of pt preferences that would be listed in the comments section of her diet order.  -MB      Recorded by [MB] Vamsi Jacinto CCC-SLP      Bed Mobility, Assessment/Treatment    Bed Mobility, Assistive Device  head of bed elevated;draw sheet  -CW     Bed Mob, Supine to Sit, [place]  maximum assist (25% patient effort);2 person assist required  -CW     Bed Mob, Sit to Supine, [place]  --   in chair  -CW     Bed Mobility, Safety Issues  cognitive deficits limit understanding;decreased use of legs for bridging/pushing  -CW     Bed Mobility, Impairments  strength decreased;impaired balance;pain  -CW     Recorded by  [CW] Samina Nash, PTA     Transfer Assessment/Treatment    Transfers, Bed-Chair [place]  maximum assist (25% patient effort);2 person assist required;verbal cues required  -CW     Transfers, Bed-Chair-Bed, Assist Device  rolling walker  -CW     Transfers, Sit-Stand [place]  maximum assist (25% patient effort);2 person assist required;verbal cues required  -CW     Transfers, Stand-Sit [place]  maximum assist (25% patient effort);2 person assist required;verbal cues required  -CW     Transfers, Sit-Stand-Sit, Assist Device  rolling walker  -CW     Transfer, Safety Issues  weight-shifting ability decreased;loses balance backward  -CW     Transfer, Impairments  impaired balance;decreased flexibility;ROM decreased;pain  -CW     Transfer, Comment  Hard posterior lean with feet  sliding forward  -CW     Recorded by  [CW] Samina Nash PTA     Gait Assessment/Treatment    Gait, [place] Level  maximum assist (25% patient effort);2 person assist required;verbal cues required  -CW     Gait, Assistive Device  rolling walker  -CW     Gait, Distance (Feet)  2  -CW     Gait, Safety Issues  loses balance backward  -CW     Gait, Impairments  decreased flexibility;ROM decreased;impaired balance;pain  -CW     Gait, Comment  Hard posterior lean with feet sliding forward.  -CW     Recorded by  [CW] Samina Nash PTA     Therapy Exercises    Right Lower Extremity  AAROM:;AROM:;15 reps   reclined in chair  -CW AAROM:;AROM:;10 reps;supine  -CW    Left Lower Extremity  PROM:;AAROM:;15 reps   reclined in chair  -CW PROM:;AAROM:;10 reps;supine  -CW    Recorded by  [CW] Samina Nash PTA [CW] Samina Nash PTA    Positioning and Restraints    Pre-Treatment Position  in bed  -CW in bed  -CW    Post Treatment Position  chair  -CW bed  -CW    In Bed   side lying left;call light within reach;encouraged to call for assist;exit alarm on;pillow between legs  -CW    In Chair  reclined;call light within reach;encouraged to call for assist;notified nsg;pillow between legs  -CW     Recorded by  [CW] Samina Nash PTA [CW] Samina Nash PTA      02/05/18 1333 02/05/18 1105 02/04/18 1500    Rehab Assessment/Intervention    Discipline occupational therapy assistant  -TS physical therapy assistant  -CW occupational therapy assistant  -CJ    Document Type therapy note (daily note)  -TS therapy note (daily note)  -CW therapy note (daily note)  -CJ    Subjective Information agree to therapy;complains of;pain  -TS agree to therapy;complains of;pain;fatigue  -CW     Patient Effort, Rehab Treatment good  -TS adequate  -CW     Treatment Not Performed   patient/family declined treatment  -CJ    Treatment Not Performed, Comment   --   Pt. refused 2x, states that her son will be here shortly!   -CJ    Precautions/Limitations fall precautions;hip precautions- left  -TS fall precautions;hip precautions- left  -CW     Specific Treatment Considerations  Requires max encouragement to participate  -CW     Recorded by [TS] PRASANTH Valdez/RITA [CW] Samina Nash PTA [CJ] FLOR SanchezA/L    Pain Assessment    Pain Assessment 0-10  -TS Tello-Baker FACES  -CW     Tello-Baker FACES Pain Rating  10  -CW     Pain Score 4  -TS      Pain Type Acute pain;Surgical pain  -TS Acute pain  -CW     Pain Location Hip  -TS Hip  -CW     Pain Orientation Right;Left  -TS Left  -CW     Pain Descriptors Aching  -TS      Pain Frequency Intermittent  -TS Intermittent   0/10 before movement  -CW     Pain Intervention(s) Repositioned  -TS Repositioned;Medication (See MAR)  -CW     Response to Interventions pt states that her R hip has more pain than L hip  -TS tolerated  -CW     Recorded by [TS] PRASANTH Valdez/RITA [CW] Samina Nsah PTA     Bed Mobility, Assessment/Treatment    Bed Mobility, Assistive Device bed rails;draw sheet  -TS draw sheet  -CW     Bed Mobility, Roll Left, [place] minimum assist (75% patient effort);2 person assist required  -TS      Bed Mob, Supine to Sit, [place]  maximum assist (25% patient effort);2 person assist required;verbal cues required  -CW     Bed Mob, Sit to Supine, [place]  maximum assist (25% patient effort);2 person assist required;verbal cues required  -CW     Bed Mobility, Safety Issues  cognitive deficits limit understanding;decreased use of legs for bridging/pushing  -CW     Bed Mobility, Impairments  pain;strength decreased  -CW     Recorded by [TS] PRASANTH Valdez/RITA [CW] Samina Nash PTA     Transfer Assessment/Treatment    Transfers, Sit-Stand [place]  moderate assist (50% patient effort);maximum assist (25% patient effort);2 person assist required;verbal cues required  -CW     Transfers, Stand-Sit [place]  moderate  assist (50% patient effort);maximum assist (25% patient effort);2 person assist required;verbal cues required  -CW     Transfers, Sit-Stand-Sit, Assist Device  rolling walker  -CW     Transfer, Safety Issues  loses balance backward  -CW     Transfer, Impairments  strength decreased;impaired balance;pain  -CW     Transfer, Comment  Stood while nsg checked skin and changed brief.  Leans heavily posterior  -CW     Recorded by  [CW] Samina Nash PTA     Lower Body Dressing Assessment/Training    LB Dressing Assess/Train, Clothing Type doffing:   brief  -TS      LB Dressing Assess/Train, Position supine  -TS      LB Dressing Assess/Train, [place] maximum assist (25% patient effort)  -TS      LB Dressing Assess/Train, Impairments pain;ROM decreased  -TS      Recorded by [TS] LÓPEZ Valdez      Toileting Assessment/Training    Toileting Assess/Train, Position supine   fowlers  -TS      Toileting Assess/Train, Indepen Level supervision required;maximum assist (25% patient effort)  -TS      Toileting Assess/Train, Comment S for hygiene to jacey area, max A for hygiene to buttocks  -TS      Recorded by [TS] LÓPEZ Valdez      Motor Skills/Interventions    Additional Documentation  Balance Skills Training (Group)  -CW     Recorded by  [CW] Samina Nash PTA     Balance Skills Training    Sitting-Level of Assistance  Close supervision;Contact guard  -CW     Sitting-Balance Support  Feet supported  -CW     Standing-Level of Assistance  Maximum assistance;x2  -CW     Static Standing Balance Support  assistive device  -CW     Standing Balance # of Minutes  --   leans heavily posterior  -CW     Recorded by  [CW] Samina Nash PTA     Therapy Exercises    Bilateral Lower Extremities  AROM:;5 reps;sitting;ankle pumps/circles;hip flexion;LAQ  -CW     Recorded by  [CW] Samina Nash PTA     Positioning and Restraints    Pre-Treatment Position in bed  -TS in bed  -CW     Post  Treatment Position bed  -TS bed  -CW     In Bed side lying left;call light within reach;encouraged to call for assist;exit alarm on;side rails up x2;pillow between legs  -TS supine;call light within reach;encouraged to call for assist;ABD pillow;heels elevated  -CW     Recorded by [TS] PRASANTH Valdez/RITA [CW] Samina Nash PTA       02/04/18 1342 02/04/18 0958       Rehab Assessment/Intervention    Discipline physical therapy assistant  -NW physical therapy assistant  -NW     Document Type therapy note (daily note)  -NW therapy note (daily note)  -NW     Subjective Information complains of;pain;fatigue  -NW agree to therapy;complains of;pain  -NW     Patient Effort, Rehab Treatment adequate  -NW adequate  -NW     Precautions/Limitations fall precautions;hip precautions- left  -NW fall precautions;hip precautions- left  -NW     Specific Treatment Considerations encourged pt to work w/PT, but wanted to be left alone  -NW pt very anxious needs encouragement to work w/ PT, was more worried about her son coming and she wanted to see him,  -NW     Recorded by [NW] Jaclyn Castano PTA [NW] Jaclyn Castano PTA     Pain Assessment    Pain Assessment Tello-Baker FACES  -NW Telol-Baker FACES  -NW     Tello-Baker FACES Pain Rating 10  -NW 8  -NW     Pain Location Hip  -NW Hip  -NW     Pain Orientation Left  -NW Left  -NW     Pain Frequency Constant/continuous  -NW Constant/continuous  -NW     Pain Intervention(s) Medication (See MAR);Repositioned  -NW Repositioned;Medication (See MAR)  -NW     Recorded by [NW] Jaclyn Castano PTA [NW] Jaclyn Castano PTA     Bed Mobility, Assessment/Treatment    Bed Mobility, Assistive Device draw sheet  -NW draw sheet  -NW     Bed Mob, Supine to Sit, [place] verbal cues required;maximum assist (25% patient effort);2 person assist required  -NW verbal cues required;moderate assist (50% patient effort);maximum assist (25% patient effort);2 person assist required  -NW     Bed  "Mob, Sit to Supine, [place] verbal cues required;maximum assist (25% patient effort);2 person assist required  -NW verbal cues required;maximum assist (25% patient effort);2 person assist required  -NW     Bed Mobility, Safety Issues cognitive deficits limit understanding  -NW cognitive deficits limit understanding;decreased use of arms for pushing/pulling;decreased use of legs for bridging/pushing  -NW     Bed Mobility, Impairments pain;strength decreased  -NW strength decreased;pain  -NW     Recorded by [NW] Jaclyn Castano PTA [NW] Jaclyn Castano PTA     Transfer Assessment/Treatment    Transfers, Sit-Stand [place] verbal cues required;moderate assist (50% patient effort);maximum assist (25% patient effort);2 person assist required  -NW verbal cues required;moderate assist (50% patient effort);2 person assist required  -NW     Transfers, Stand-Sit [place] verbal cues required;moderate assist (50% patient effort)  -NW verbal cues required;moderate assist (50% patient effort)  -NW     Transfers, Sit-Stand-Sit, Assist Device rolling walker  -NW rolling walker  -NW     Transfer, Safety Issues  step length decreased;weight-shifting ability decreased  -NW     Transfer, Impairments  pain;strength decreased  -NW     Transfer, Comment attempt to take sidesteps this pm but pt unable \"please just lay me down\" encouraged pt to keep trying but adamently refused so got pt laid bk down  -NW      Recorded by [NW] Jaclyn Castano PTA [NW] Jaclyn Castano PTA     Gait Assessment/Treatment    Gait, [place] Level  moderate assist (50% patient effort);2 person assist required  -NW     Gait, Assistive Device  rolling walker  -NW     Gait, Distance (Feet)  --   few sidesteps towards HOB  -NW     Gait, Gait Deviations  step length decreased;stride length decreased  -NW     Gait, Safety Issues  step length decreased;balance decreased during turns  -NW     Gait, Impairments  pain;strength decreased  -NW     Gait, " Comment  pt very anxious needs lots of cues for safety  -NW     Recorded by  [NW] Jaclyn Castano PTA     Therapy Exercises    Left Lower Extremity  --   reviewed ex's but pt refused to follow through w/ them  -NW     Recorded by  [NW] Jaclyn Castano PTA     Positioning and Restraints    Pre-Treatment Position in bed  -NW in bed  -NW     Post Treatment Position bed  -NW bed  -NW     In Bed fowlers;call light within reach;encouraged to call for assist;notified nsg  -NW fowlers;call light within reach;encouraged to call for assist;exit alarm on;notified nsg  -NW     Recorded by [NW] Jaclyn Castano PTA [NW] Jaclyn Castano PTA       User Key  (r) = Recorded By, (t) = Taken By, (c) = Cosigned By    Initials Name Effective Dates    JANETH Jacinto, [facility]-SLP 08/02/16 -     CJ Cristian Carias, RADER/L 08/02/16 -     TS Nallely Tripp, RADER/L 08/02/16 -     CW Samina Nash, PTA 06/22/15 -     NW Jaclyn Castano, PTA 08/02/16 -                 IP PT Goals       02/03/18 1229          Bed Mobility PT LTG    Bed Mobility PT LTG, Date Established 02/03/18  -TB      Bed Mobility PT LTG, Time to Achieve by discharge  -TB      Bed Mobility PT LTG, Activity Type all bed mobility  -TB      Bed Mobility PT LTG, [place] Level supervision required  -TB      Transfer Training PT LTG    Transfer Training PT LTG, Date Established 02/03/18  -TB      Transfer Training PT LTG, Time to Achieve by discharge  -TB      Transfer Training PT LTG, Activity Type all transfers  -TB      Transfer Training PT LTG, [place] Level contact guard assist  -TB      Transfer Training PT LTG, Assist Device walker, rolling  -TB      Gait Training PT LTG    Gait Training Goal PT LTG, Date Established 02/03/18  -TB      Gait Training Goal PT LTG, Time to Achieve by discharge  -TB      Gait Training Goal PT LTG, [place] Level contact guard assist  -TB      Gait Training Goal PT LTG, Assist Device walker, rolling  -TB      Gait  Training Goal PT LTG, Distance to Achieve 20  -TB      Gait Training Goal PT LTG, Additional Goal able to stand upright without losing balance posterior with min limping  -TB      Range of Motion PT LTG    Range of Motion Goal PT LTG, Date Established 02/03/18  -TB      Range of Motion Goal PT LTG, Time to Achieve by discharge  -TB      Range of Motion Goal PT LTG, PROM Measure benja ankle DF >0 deg  -TB        User Key  (r) = Recorded By, (t) = Taken By, (c) = Cosigned By    Initials Name Provider Type    TB Josias White, PT Physical Therapist          Physical Therapy Education     Title: PT OT SLP Therapies (Active)     Topic: Physical Therapy (Active)     Point: Mobility training (Active)    Learning Progress Summary    Learner Readiness Method Response Comment Documented by Status   Patient Acceptance E,D NR Bed mobility, transfers, benefit of activity, plan of care CW 02/06/18 1019 Active    Acceptance E,D NR Bed mobility, transfers, plan of care, benefit of activity CW 02/05/18 1143 Active    Acceptance E VU,NR benefits of activity NW 02/04/18 1037 Done    Acceptance E NR  TB 02/03/18 1227 Active   Family Acceptance E NR   02/03/18 1227 Active               Point: Home exercise program (Active)    Learning Progress Summary    Learner Readiness Method Response Comment Documented by Status   Patient Acceptance E,D NR Bed mobility, transfers, benefit of activity, plan of care CW 02/06/18 1019 Active    Acceptance E,D NR Bed mobility, transfers, plan of care, benefit of activity CW 02/05/18 1143 Active    Acceptance E NR  TB 02/03/18 1227 Active   Family Acceptance E NR   02/03/18 1227 Active               Point: Body mechanics (Active)    Learning Progress Summary    Learner Readiness Method Response Comment Documented by Status   Patient Acceptance E,D NR Bed mobility, transfers, benefit of activity, plan of care CW 02/06/18 1019 Active    Acceptance E,D NR Bed mobility, transfers, plan of care, benefit of  activity  02/05/18 1143 Active    Acceptance E NR  TB 02/03/18 1227 Active   Family Acceptance E NR   02/03/18 1227 Active               Point: Precautions (Active)    Learning Progress Summary    Learner Readiness Method Response Comment Documented by Status   Patient Acceptance E,D NR Bed mobility, transfers, benefit of activity, plan of care  02/06/18 1019 Active    Acceptance E,D NR Bed mobility, transfers, plan of care, benefit of activity  02/05/18 1143 Active    Acceptance E NR   02/03/18 1227 Active   Family Acceptance E NR   02/03/18 1227 Active                      User Key     Initials Effective Dates Name Provider Type Discipline     08/02/16 -  Josias White, PT Physical Therapist PT    CW 06/22/15 -  Samina Nash, PTA Physical Therapy Assistant PT     08/02/16 -  Jaclyn Castano PTA Physical Therapy Assistant PT                    PT Recommendation and Plan  Anticipated Equipment Needs At Discharge: gait belt, two wheeled walker  Anticipated Discharge Disposition: transitional care  Planned Therapy Interventions: strengthening, ROM (Range of Motion), balance training, bed mobility training, gait training, transfer training  PT Frequency: 2 times/day  Plan of Care Review  Plan Of Care Reviewed With: patient  Progress: progress towards functional goals is fair  Outcome Summary/Follow up Plan: Pt requires max assist of 2 for bed mobility, transfers and gait. Pt has a hard posterior lean when standing and slides her feet forward while leaning back when trying to ambulate.  Pt will need continued therapy from SNF at discharge.          Outcome Measures       02/06/18 1000 02/05/18 1400 02/05/18 1100    How much help from another person do you currently need...    Turning from your back to your side while in flat bed without using bedrails? 2  -CW  2  -CW    Moving from lying on back to sitting on the side of a flat bed without bedrails? 2  -CW  2  -CW    Moving to and from a bed to  a chair (including a wheelchair)? 2  -CW  1  -CW    Standing up from a chair using your arms (e.g., wheelchair, bedside chair)? 2  -CW  2  -CW    Climbing 3-5 steps with a railing? 1  -CW  1  -CW    To walk in hospital room? 2  -CW  1  -CW    AM-PAC 6 Clicks Score 11  -CW  9  -CW    How much help from another is currently needed...    Putting on and taking off regular lower body clothing?  2  -TS     Bathing (including washing, rinsing, and drying)  2  -TS     Toileting (which includes using toilet bed pan or urinal)  2  -TS     Putting on and taking off regular upper body clothing  3  -TS     Taking care of personal grooming (such as brushing teeth)  3  -TS     Eating meals  4  -TS     Score  16  -TS     Functional Assessment    Outcome Measure Options AM-PAC 6 Clicks Basic Mobility (PT)  -CW AM-PAC 6 Clicks Daily Activity (OT)  -TS AM-PAC 6 Clicks Basic Mobility (PT)  -CW      User Key  (r) = Recorded By, (t) = Taken By, (c) = Cosigned By    Initials Name Provider Type     LÓPEZ Valdez Occupational Therapy Assistant    MÓNICA Nash PTA Physical Therapy Assistant           Time Calculation:         PT Charges       02/06/18 1503 02/06/18 1155 02/06/18 1026    Time Calculation    Start Time 1405  -CW 1140  -CW 0900  -CW    Stop Time 1428  -CW 1151  -CW 0940  -CW    Time Calculation (min) 23 min  -CW 11 min  -CW 40 min  -CW    PT Received On 02/06/18  -CW 02/06/18  -CW 02/06/18  -CW    PT Goal Re-Cert Due Date 02/13/18  -CW 02/13/18  -CW 02/13/18  -CW    Time Calculation- PT    Total Timed Code Minutes- PT 23 minute(s)  -CW 11 minute(s)  -CW 40 minute(s)  -CW      User Key  (r) = Recorded By, (t) = Taken By, (c) = Cosigned By    Initials Name Provider Type    MÓNICA Nash PTA Physical Therapy Assistant          Therapy Charges for Today     Code Description Service Date Service Provider Modifiers Qty    73472119074  PT THERAPEUTIC ACT EA 15 MIN 2/5/2018 Samina Nash PTA  GP, KX 1    81143988086 HC PT THER PROC EA 15 MIN 2/5/2018 Samina Nash, PTA GP, KX 1    05859938915 HC PT THER PROC EA 15 MIN 2/5/2018 Samina Nash, PTA GP, KX 1    16045635072 HC PT THERAPEUTIC ACT EA 15 MIN 2/6/2018 Samina Nash, PTA GP, KX 2    69316057386 HC PT THER PROC EA 15 MIN 2/6/2018 Samina Nash, PTA GP, KX 1    63344294425 HC PT THERAPEUTIC ACT EA 15 MIN 2/6/2018 Samina Nash, PTA GP, KX 1    30360818167 HC PT THER PROC EA 15 MIN 2/6/2018 Samina Nash, PTA GP, KX 2          PT G-Codes  Outcome Measure Options: AM-PAC 6 Clicks Basic Mobility (PT)  Score: 9  Functional Limitation: Mobility: Walking and moving around  Mobility: Walking and Moving Around Current Status (): At least 60 percent but less than 80 percent impaired, limited or restricted  Mobility: Walking and Moving Around Goal Status (): At least 40 percent but less than 60 percent impaired, limited or restricted    Samina Nash PTA  2/6/2018

## 2018-02-06 NOTE — PLAN OF CARE
Problem: Patient Care Overview (Adult)  Goal: Plan of Care Review  Outcome: Ongoing (interventions implemented as appropriate)   02/06/18 1013   Coping/Psychosocial Response Interventions   Plan Of Care Reviewed With patient   Patient Care Overview   Progress no change   Outcome Evaluation   Outcome Summary/Follow up Plan Pt completed trials of thin water via cup with no overt s/s of aspiration. Pt had complaints about food, so SLP had an extensive conversation about pt preferences. She appeared generally displeased, stating that everything she has received so far has not been good. SLP notified CTY person for 3A of pt preferences that would be listed in the comments section of her diet order.       Problem: Inpatient SLP  Goal: Dysphagia- Patient will safely consume diet as per recommendation with no signs/symptoms of aspiration  Outcome: Ongoing (interventions implemented as appropriate)   02/03/18 1418 02/06/18 1013   Safely Consume Diet   Safely Consume Diet- SLP, Date Established 02/03/18 --    Safely Consume Diet- SLP, Time to Achieve 2 days --    Safely Consume Diet- SLP, Additional Goal Pt will tolerate trials of current diet with no overt s/s of aspiration. --    Safely Consume Diet- SLP, Date Goal Reviewed --  02/06/18   Safely Consume Diet- SLP, Outcome goal ongoing --      Goal: Cognitive-linguistic-Patient will improve Cognitive-linguistic skills to improve safety and safety awareness in environment  Outcome: Ongoing (interventions implemented as appropriate)   02/05/18 1446 02/06/18 1013   Cognitive Linguistic- Improve Safety and Awareness   Cognitive Linguistic Improve Safety and Awareness- SLP, Date Established 02/05/18 --    Cognitive Linguistic Improve Safety and Awareness- SLP, Time to Achieve by discharge --    Cognitive Linguistic Improve Safety and Awareness- SLP, Activity Level Patient will improve attention skills for increased safety in environment;Patient will improve orientation for  increased safety in environment;Patient will improve memory skills for increased safety in environment;Patient will improve functional problem solving skills for increased safety in environment --    Cognitive Linguistic Improve Safety and Awareness- SLP, Date Goal Reviewed --  02/06/18   Cognitive Linguistic Improve Safety and Awareness- SLP, Outcome goal ongoing --

## 2018-02-06 NOTE — THERAPY TREATMENT NOTE
Acute Care - Speech Language Pathology Treatment Note  [facility]     Patient Name: Andres Walter  : 1929  MRN: [id number]  Today's Date: 2018  Referring Physician: DESTINEE Roach      Admit Date: 2018  Pt completed trials of thin water via cup with no overt s/s of aspiration. Pt had complaints about food, so SLP had an extensive conversation about pt preferences. She appeared generally displeased, stating that everything she has received so far has not been good. SLP notified CTY person for 3A of pt preferences that would be listed in the comments section of her diet order.  Vamsi Jacinto CCC-SLP 2018 10:15 AM    Visit Dx:      ICD-10-CM ICD-9-CM   1. Fall, initial encounter W19.XXXA E888.9   2. Closed fracture of left hip, initial encounter S72.002A 820.8   3. Pneumonia of both lungs due to infectious organism, unspecified part of lung J18.9 483.8   4. Acute UTI N39.0 599.0   5. Abnormality of gait and mobility R26.9 781.2   6. Dysphagia, unspecified type R13.10 787.20   7. Impaired mobility and ADLs Z74.09 799.89   8. Impaired cognition R41.89 294.9     Patient Active Problem List   Diagnosis   • Fall              Adult Rehabilitation Note       18 0939 18 0900 18 1505    Rehab Assessment/Intervention    Discipline speech language pathologist  -MB physical therapy assistant  -CW physical therapy assistant  -CW    Document Type therapy note (daily note)  -MB therapy note (daily note)  -CW therapy note (daily note)  -CW    Subjective Information agree to therapy  -MB agree to therapy;complains of;pain;fatigue  -CW agree to therapy;complains of;weakness;fatigue;pain  -CW    Patient Effort, Rehab Treatment adequate  -MB fair  -CW fair  -CW    Precautions/Limitations  fall precautions;hip precautions- left  -CW fall precautions;hip precautions- left  -CW    Recorded by [MB] Vamsi Jacinto CCC-SLP [CW] Samina Nash PTA [CW] Samina Nash PTA    Pain Assessment     "Pain Assessment  Tello-Cabral FACES  -CW Tello-Baker FACES  -CW    Tello-Baker FACES Pain Rating  4  -CW 4  -CW    Pain Type  Acute pain;Surgical pain  -CW Acute pain;Surgical pain  -CW    Pain Location  Hip  -CW Hip  -CW    Pain Orientation  Left  -CW Right;Left  -CW    Pain Descriptors  Aching  -CW Aching  -CW    Pain Frequency  Intermittent  -CW Intermittent  -CW    Pain Intervention(s)  Repositioned  -CW Repositioned  -CW    Response to Interventions  tolerated  -CW tolerated  -CW    Multiple Pain Sites  Yes  -CW     Recorded by  [CW] Samina Nash PTA [CW] Samina Nash PTA    Pain 2    Tello-Baker FACES Pain Rating 2  6  -CW     Pain Type 2  Acute pain  -CW     Pain Location 2  Foot  -CW     Pain Orientation 2  Right;Left  -CW     Pain Descriptors 2  Tender   \"hurts\"  -CW     Pain Frequency 2  Constant/continuous  -CW     Pain Intervention(s) 2  Repositioned  -CW     Response to Interventions 2  tolerated  -CW     Recorded by  [CW] Samina Nash PTA     Cognitive Assessment/Intervention    Current Cognitive/Communication Assessment  impaired  -CW     Recorded by  [CW] Samina Nash PTA     Improve memory skills    Memory Skills Progress 40%;without cues  -MB      Comments: Improve memory skills Pt was able to tell SLP some of the things that were on her breakfast tray. She repeated herself 3x about getting one Honey Nut Cheerio and one regular Cheerio package. She appeared to have no memory that she had already told the therapist that information each time.   -MB      Recorded by [MB] Vamsi Jacinto [facility]-SLP      Dysphagia/Swallow Intervention    Dysphagia/Swallow Therapeutic Feed Pt completed trials of thin water via cup with no overt s/s of aspiration. Pt had complaints about food, so SLP had an extensive conversation about pt preferences. She appeared generally displeased, stating that everything she has received so far has not been good. SLP notified CTY person for 3A of pt " preferences that would be listed in the comments section of her diet order.  -MB      Recorded by [MB] Vamsi aJcinto CCC-SLP      Bed Mobility, Assessment/Treatment    Bed Mobility, Assistive Device  head of bed elevated;draw sheet  -CW     Bed Mob, Supine to Sit, [place]  maximum assist (25% patient effort);2 person assist required  -CW     Bed Mob, Sit to Supine, [place]  --   in chair  -CW     Bed Mobility, Safety Issues  cognitive deficits limit understanding;decreased use of legs for bridging/pushing  -CW     Bed Mobility, Impairments  strength decreased;impaired balance;pain  -CW     Recorded by  [MÓNICA] Samina Nash PTA     Transfer Assessment/Treatment    Transfers, Bed-Chair [place]  maximum assist (25% patient effort);2 person assist required;verbal cues required  -CW     Transfers, Bed-Chair-Bed, Assist Device  rolling walker  -CW     Transfers, Sit-Stand [place]  maximum assist (25% patient effort);2 person assist required;verbal cues required  -CW     Transfers, Stand-Sit [place]  maximum assist (25% patient effort);2 person assist required;verbal cues required  -CW     Transfers, Sit-Stand-Sit, Assist Device  rolling walker  -CW     Transfer, Safety Issues  weight-shifting ability decreased;loses balance backward  -CW     Recorded by  [MÓNICA] Samina Nash PTA     Therapy Exercises    Right Lower Extremity   AAROM:;AROM:;10 reps;supine  -CW    Left Lower Extremity   PROM:;AAROM:;10 reps;supine  -CW    Recorded by   [MÓNICA] Samina Nash PTA    Positioning and Restraints    Pre-Treatment Position   in bed  -CW    Post Treatment Position   bed  -CW    In Bed   side lying left;call light within reach;encouraged to call for assist;exit alarm on;pillow between legs  -CW    Recorded by   [MÓNICA] Samina Nash PTA      02/05/18 1333 02/05/18 1105 02/04/18 1500    Rehab Assessment/Intervention    Discipline occupational therapy assistant  -TS physical therapy  assistant  -CW occupational therapy assistant  -CJ    Document Type therapy note (daily note)  -TS therapy note (daily note)  -CW therapy note (daily note)  -CJ    Subjective Information agree to therapy;complains of;pain  -TS agree to therapy;complains of;pain;fatigue  -CW     Patient Effort, Rehab Treatment good  -TS adequate  -CW     Treatment Not Performed   patient/family declined treatment  -CJ    Treatment Not Performed, Comment   --   Pt. refused 2x, states that her son will be here shortly!  -CJ    Precautions/Limitations fall precautions;hip precautions- left  -TS fall precautions;hip precautions- left  -CW     Specific Treatment Considerations  Requires max encouragement to participate  -CW     Recorded by [TS] PRASANTH Valdez/RITA [CW] Samina Nash PTA [CJ] PRASANTH Sanchez/L    Pain Assessment    Pain Assessment 0-10  -TS Tello-Baker FACES  -CW     Tello-Baker FACES Pain Rating  10  -CW     Pain Score 4  -TS      Pain Type Acute pain;Surgical pain  -TS Acute pain  -CW     Pain Location Hip  -TS Hip  -CW     Pain Orientation Right;Left  -TS Left  -CW     Pain Descriptors Aching  -TS      Pain Frequency Intermittent  -TS Intermittent   0/10 before movement  -CW     Pain Intervention(s) Repositioned  -TS Repositioned;Medication (See MAR)  -CW     Response to Interventions pt states that her R hip has more pain than L hip  -TS tolerated  -CW     Recorded by [TS] PRASANTH Valdez/RITA [CW] Samina Nash PTA     Bed Mobility, Assessment/Treatment    Bed Mobility, Assistive Device bed rails;draw sheet  -TS draw sheet  -CW     Bed Mobility, Roll Left, [place] minimum assist (75% patient effort);2 person assist required  -TS      Bed Mob, Supine to Sit, [place]  maximum assist (25% patient effort);2 person assist required;verbal cues required  -CW     Bed Mob, Sit to Supine, [place]  maximum assist (25% patient effort);2 person assist required;verbal cues required   -CW     Bed Mobility, Safety Issues  cognitive deficits limit understanding;decreased use of legs for bridging/pushing  -CW     Bed Mobility, Impairments  pain;strength decreased  -CW     Recorded by [TS] LÓPEZ Valdez [CW] Samina Nash PTA     Transfer Assessment/Treatment    Transfers, Sit-Stand [place]  moderate assist (50% patient effort);maximum assist (25% patient effort);2 person assist required;verbal cues required  -CW     Transfers, Stand-Sit [place]  moderate assist (50% patient effort);maximum assist (25% patient effort);2 person assist required;verbal cues required  -CW     Transfers, Sit-Stand-Sit, Assist Device  rolling walker  -CW     Transfer, Safety Issues  loses balance backward  -CW     Transfer, Impairments  strength decreased;impaired balance;pain  -CW     Transfer, Comment  Stood while nsg checked skin and changed brief.  Leans heavily posterior  -CW     Recorded by  [CW] Samina Nash PTA     Lower Body Dressing Assessment/Training    LB Dressing Assess/Train, Clothing Type doffing:   brief  -TS      LB Dressing Assess/Train, Position supine  -TS      LB Dressing Assess/Train, [place] maximum assist (25% patient effort)  -TS      LB Dressing Assess/Train, Impairments pain;ROM decreased  -TS      Recorded by [TS] PRASANTH Valdez/L      Toileting Assessment/Training    Toileting Assess/Train, Position supine   fowlers  -TS      Toileting Assess/Train, Indepen Level supervision required;maximum assist (25% patient effort)  -TS      Toileting Assess/Train, Comment S for hygiene to jacey area, max A for hygiene to buttocks  -TS      Recorded by [TS] LÓPEZ Valdez      Motor Skills/Interventions    Additional Documentation  Balance Skills Training (Group)  -CW     Recorded by  [CW] Samina Nash PTA     Balance Skills Training    Sitting-Level of Assistance  Close supervision;Contact guard  -CW     Sitting-Balance Support  Feet  supported  -CW     Standing-Level of Assistance  Maximum assistance;x2  -CW     Static Standing Balance Support  assistive device  -CW     Standing Balance # of Minutes  --   leans heavily posterior  -CW     Recorded by  [CW] Samina Nash PTA     Therapy Exercises    Bilateral Lower Extremities  AROM:;5 reps;sitting;ankle pumps/circles;hip flexion;LAQ  -CW     Recorded by  [CW] Samina Nash PTA     Positioning and Restraints    Pre-Treatment Position in bed  -TS in bed  -CW     Post Treatment Position bed  -TS bed  -CW     In Bed side lying left;call light within reach;encouraged to call for assist;exit alarm on;side rails up x2;pillow between legs  -TS supine;call light within reach;encouraged to call for assist;ABD pillow;heels elevated  -CW     Recorded by [TS] PRASANTH Valdez/RITA [CW] Samina Nash PTA       02/04/18 1342 02/04/18 0958 02/03/18 1130    Rehab Assessment/Intervention    Discipline physical therapy assistant  -NW physical therapy assistant  -NW     Document Type therapy note (daily note)  -NW therapy note (daily note)  -NW     Subjective Information complains of;pain;fatigue  -NW agree to therapy;complains of;pain  -NW     Patient Effort, Rehab Treatment adequate  -NW adequate  -NW     Precautions/Limitations fall precautions;hip precautions- left  -NW fall precautions;hip precautions- left  -NW     Specific Treatment Considerations encourged pt to work w/PT, but wanted to be left alone  -NW pt very anxious needs encouragement to work w/ PT, was more worried about her son coming and she wanted to see him,  -NW     Recorded by [NW] Jaclyn Castano PTA [NW] Jaclyn Castano PTA     Pain Assessment    Pain Assessment Tello-Baker FACES  -NW Tello-Baker FACES  -NW     Tello-Baker FACES Pain Rating 10  -NW 8  -NW     Pain Location Hip  -NW Hip  -NW     Pain Orientation Left  -NW Left  -NW     Pain Frequency Constant/continuous  -NW Constant/continuous  -NW     Pain  Intervention(s) Medication (See MAR);Repositioned  -NW Repositioned;Medication (See MAR)  -NW     Recorded by [NW] Jaclyn Castano PTA [NW] Jaclyn Castano PTA     Swallow Assessment/Intervention    Additional Documentation   Dysphagia/Swallow Intervention (Group)  -MB    Recorded by   [MB] Vamsi Jacinto CCC-SLP    Dysphagia/Swallow Intervention    Dysphagia/Swallow Therapeutic Feed   Pt will tolerate trials of current diet with no overt s/s of aspiration.  -MB    Recorded by   [MB] Vamsi Jacinto CCC-SLP    Bed Mobility, Assessment/Treatment    Bed Mobility, Assistive Device draw sheet  -NW draw sheet  -NW     Bed Mob, Supine to Sit, [place] verbal cues required;maximum assist (25% patient effort);2 person assist required  -NW verbal cues required;moderate assist (50% patient effort);maximum assist (25% patient effort);2 person assist required  -NW     Bed Mob, Sit to Supine, [place] verbal cues required;maximum assist (25% patient effort);2 person assist required  -NW verbal cues required;maximum assist (25% patient effort);2 person assist required  -NW     Bed Mobility, Safety Issues cognitive deficits limit understanding  -NW cognitive deficits limit understanding;decreased use of arms for pushing/pulling;decreased use of legs for bridging/pushing  -NW     Bed Mobility, Impairments pain;strength decreased  -NW strength decreased;pain  -NW     Recorded by [NW] Jaclyn Castano PTA [NW] Jaclyn Castano PTA     Transfer Assessment/Treatment    Transfers, Sit-Stand [place] verbal cues required;moderate assist (50% patient effort);maximum assist (25% patient effort);2 person assist required  -NW verbal cues required;moderate assist (50% patient effort);2 person assist required  -NW     Transfers, Stand-Sit [place] verbal cues required;moderate assist (50% patient effort)  -NW verbal cues required;moderate assist (50% patient effort)  -NW     Transfers, Sit-Stand-Sit, Assist Device  "rolling walker  -NW rolling walker  -NW     Transfer, Safety Issues  step length decreased;weight-shifting ability decreased  -NW     Transfer, Impairments  pain;strength decreased  -NW     Transfer, Comment attempt to take sidesteps this pm but pt unable \"please just lay me down\" encouraged pt to keep trying but adamently refused so got pt laid bk down  -NW      Recorded by [NW] Jaclyn Castano PTA [NW] Jaclyn Castano PTA     Gait Assessment/Treatment    Gait, [place]  moderate assist (50% patient effort);2 person assist required  -NW     Gait, Assistive Device  rolling walker  -NW     Gait, Distance (Feet)  --   few sidesteps towards HOB  -NW     Gait, Gait Deviations  step length decreased;stride length decreased  -NW     Gait, Safety Issues  step length decreased;balance decreased during turns  -NW     Gait, Impairments  pain;strength decreased  -NW     Gait, Comment  pt very anxious needs lots of cues for safety  -NW     Recorded by  [NW] Jaclyn Castano PTA     Therapy Exercises    Left Lower Extremity  --   reviewed ex's but pt refused to follow through w/ them  -NW     Recorded by  [NW] Jaclyn Castano PTA     Positioning and Restraints    Pre-Treatment Position in bed  -NW in bed  -NW     Post Treatment Position bed  -NW bed  -NW     In Bed fowlers;call light within reach;encouraged to call for assist;notified nsg  -NW fowlers;call light within reach;encouraged to call for assist;exit alarm on;notified nsg  -NW     Recorded by [NW] Jaclyn Castano PTA [NW] Jaclyn Castano PTA       User Key  (r) = Recorded By, (t) = Taken By, (c) = Cosigned By    Initials Name Effective Dates    JANETH Jacinto [facility]-SLP 08/02/16 -     CJ Cristian Carias, RADER/L 08/02/16 -     TS Nallely Tripp, RADER/L 08/02/16 -     CW Samina Nash, PTA 06/22/15 -     NW Jaclyn Castano PTA 08/02/16 -               IP SLP Goals       02/06/18 1013 02/05/18 1446 02/03/18 1418    Safely Consume Diet    " Safely Consume Diet- SLP, Date Established   02/03/18  -MB    Safely Consume Diet- SLP, Time to Achieve   2 days  -MB    Safely Consume Diet- SLP, Additional Goal   Pt will tolerate trials of current diet with no overt s/s of aspiration.  -MB    Safely Consume Diet- SLP, Date Goal Reviewed 02/06/18  -MB      Safely Consume Diet- SLP, Outcome   goal ongoing  -MB    Cognitive Linguistic- Improve Safety and Awareness    Cognitive Linguistic Improve Safety and Awareness- SLP, Date Established  02/05/18  -CS (r) MM (t) CS (c)     Cognitive Linguistic Improve Safety and Awareness- SLP, Time to Achieve  by discharge  -CS (r) MM (t) CS (c)     Cognitive Linguistic Improve Safety and Awareness- SLP, Activity Level  Patient will improve attention skills for increased safety in environment;Patient will improve orientation for increased safety in environment;Patient will improve memory skills for increased safety in environment;Patient will improve functional problem solving skills for increased safety in environment  -CS (r) MM (t) CS (c)     Cognitive Linguistic Improve Safety and Awareness- SLP, Date Goal Reviewed 02/06/18  -MB 02/05/18  -CS (r) MM (t) CS (c)     Cognitive Linguistic Improve Safety and Awareness- SLP, Outcome  goal ongoing  -CS (r) MM (t) CS (c)       User Key  (r) = Recorded By, (t) = Taken By, (c) = Cosigned By    Initials Name Provider Type    JANETH Jacinto, CCC-SLP Speech and Language Pathologist    LILLIANA Grove, MS CCC-SLP Speech and Language Pathologist    MM Isabella Soliman, Speech Therapy Student Speech Therapy Student          EDUCATION  The patient has been educated in the following areas:   Dysphagia (Swallowing Impairment).    SLP Recommendation and Plan              Anticipated Discharge Disposition: skilled nursing facility                Plan of Care Review  Plan Of Care Reviewed With: patient  Progress: no change  Outcome Summary/Follow up Plan: Pt completed trials of thin water  via cup with no overt s/s of aspiration. Pt had complaints about food, so SLP had an extensive conversation about pt preferences. She appeared generally displeased, stating that everything she has received so far has not been good. SLP notified CTY person for 3A of pt preferences that would be listed in the comments section of her diet order.         SLP Outcome Measures (last 72 hours)      SLP Outcome Measures       02/05/18 1400 02/03/18 1400       SLP Outcome Measures    Outcome Measure Used? Adult NOMS  -CS (r) MM (t) CS (c) Adult NOMS  -MB     OTHER    SLP Diagnoses Cognitive-Communication Disorder  -CS (r) MM (t) CS (c)      FCM Scores    FCM Chosen Memory  -CS (r) MM (t) CS (c) Swallowing  -MB     Swallowing FCM Score  6  -MB     Memory FCM Score 4  -CS (r) MM (t) CS (c)        User Key  (r) = Recorded By, (t) = Taken By, (c) = Cosigned By    Initials Name Effective Dates    JANETH Jacinto CCC-SLP 08/02/16 -     CS Wally Grove, MS [facility]-SLP 06/28/17 -     MM Isabella Soliman, Speech Therapy Student 12/12/17 -           Time Calculation:         Time Calculation- SLP       02/06/18 1014          Time Calculation- SLP    SLP Start Time 0939  -MB      SLP Stop Time 1003  -MB      SLP Time Calculation (min) 24 min  -MB      SLP Received On 02/06/18  -MB        User Key  (r) = Recorded By, (t) = Taken By, (c) = Cosigned By    Initials Name Provider Type    JANETH Jacinto CCC-SLP Speech and Language Pathologist          Therapy Charges for Today     Code Description Service Date Service Provider Modifiers Qty    92772648973 HC ST TREATMENT SPEECH 1 2/6/2018 KATARZYNA Strickland GN, KX 1    [id number] HC ST TREATMENT SWALLOW 1 2/6/2018 KATARZYNA Strickland, KX 1          SLP G-Codes  SLP NOMS Used?: Yes  Functional Limitations: Memory  Swallow Current Status (): At least 1 percent but less than 20 percent impaired, limited or restricted  Swallow Goal Status (): At least 1  percent but less than 20 percent impaired, limited or restricted  Memory Current Status (): At least 40 percent but less than 60 percent impaired, limited or restricted  Memory Goal Status (): At least 40 percent but less than 60 percent impaired, limited or restricted  Memory Discharge Status (): At least 40 percent but less than 60 percent impaired, limited or restricted    Vamsi Jacinto CCC-SLP  2018 and Acute Care - Speech Language Pathology   Swallow Treatment Note [facility]     Patient Name: Andres Walter  : 1929  MRN: [id number]  Today's Date: 2018  Onset of Illness/Injury or Date of Surgery Date: 18  Date of Referral to SLP: 18  Referring Physician: DESTINEE Roach      Admit Date: 2018    Visit Dx:      ICD-10-CM ICD-9-CM   1. Fall, initial encounter W19.XXXA E888.9   2. Closed fracture of left hip, initial encounter S72.002A 820.8   3. Pneumonia of both lungs due to infectious organism, unspecified part of lung J18.9 483.8   4. Acute UTI N39.0 599.0   5. Abnormality of gait and mobility R26.9 781.2   6. Dysphagia, unspecified type R13.10 787.20   7. Impaired mobility and ADLs Z74.09 799.89   8. Impaired cognition R41.89 294.9     Patient Active Problem List   Diagnosis   • Fall             Adult Rehabilitation Note       18 0939 18 0900 18 1505    Rehab Assessment/Intervention    Discipline speech language pathologist  -MB physical therapy assistant  -CW physical therapy assistant  -CW    Document Type therapy note (daily note)  -MB therapy note (daily note)  -CW therapy note (daily note)  -CW    Subjective Information agree to therapy  -MB agree to therapy;complains of;pain;fatigue  -CW agree to therapy;complains of;weakness;fatigue;pain  -CW    Patient Effort, Rehab Treatment adequate  -MB fair  -CW fair  -CW    Precautions/Limitations  fall precautions;hip precautions- left  -CW fall precautions;hip precautions- left  -CW    Recorded by  "[MB] Vamsi Jacinto CCC-SLP [CW] Samina Nash PTA [CW] Samina Nash PTA    Pain Assessment    Pain Assessment  Tello-Cabral FACES  -CW Tello-Baker FACES  -CW    Tello-Baker FACES Pain Rating  4  -CW 4  -CW    Pain Type  Acute pain;Surgical pain  -CW Acute pain;Surgical pain  -CW    Pain Location  Hip  -CW Hip  -CW    Pain Orientation  Left  -CW Right;Left  -CW    Pain Descriptors  Aching  -CW Aching  -CW    Pain Frequency  Intermittent  -CW Intermittent  -CW    Pain Intervention(s)  Repositioned  -CW Repositioned  -CW    Response to Interventions  tolerated  -CW tolerated  -CW    Multiple Pain Sites  Yes  -CW     Recorded by  [CW] Samina Nash PTA [CW] Samina Nash PTA    Pain 2    Tello-Baker FACES Pain Rating 2  6  -CW     Pain Type 2  Acute pain  -CW     Pain Location 2  Foot  -CW     Pain Orientation 2  Right;Left  -CW     Pain Descriptors 2  Tender   \"hurts\"  -CW     Pain Frequency 2  Constant/continuous  -CW     Pain Intervention(s) 2  Repositioned  -CW     Response to Interventions 2  tolerated  -CW     Recorded by  [CW] Samina Nash PTA     Cognitive Assessment/Intervention    Current Cognitive/Communication Assessment  impaired  -CW     Recorded by  [CW] Samina Nash PTA     Improve memory skills    Memory Skills Progress 40%;without cues  -MB      Comments: Improve memory skills Pt was able to tell SLP some of the things that were on her breakfast tray. She repeated herself 3x about getting one Honey Nut Cheerio and one regular Cheerio package. She appeared to have no memory that she had already told the therapist that information each time.   -MB      Recorded by [MB] Vamsi Jacinto [facility]-SLP      Dysphagia/Swallow Intervention    Dysphagia/Swallow Therapeutic Feed Pt completed trials of thin water via cup with no overt s/s of aspiration. Pt had complaints about food, so SLP had an extensive conversation about pt preferences. She appeared generally displeased, " stating that everything she has received so far has not been good. SLP notified CTY person for 3A of pt preferences that would be listed in the comments section of her diet order.  -MB      Recorded by [MB] Vamsi Jacinto CCC-SLP      Bed Mobility, Assessment/Treatment    Bed Mobility, Assistive Device  head of bed elevated;draw sheet  -CW     Bed Mob, Supine to Sit, [place]  maximum assist (25% patient effort);2 person assist required  -CW     Bed Mob, Sit to Supine, [place]  --   in chair  -CW     Bed Mobility, Safety Issues  cognitive deficits limit understanding;decreased use of legs for bridging/pushing  -CW     Bed Mobility, Impairments  strength decreased;impaired balance;pain  -CW     Recorded by  [MÓNICA] Samina Nash PTA     Transfer Assessment/Treatment    Transfers, Bed-Chair [place]  maximum assist (25% patient effort);2 person assist required;verbal cues required  -CW     Transfers, Bed-Chair-Bed, Assist Device  rolling walker  -CW     Transfers, Sit-Stand [place]  maximum assist (25% patient effort);2 person assist required;verbal cues required  -CW     Transfers, Stand-Sit [place]  maximum assist (25% patient effort);2 person assist required;verbal cues required  -CW     Transfers, Sit-Stand-Sit, Assist Device  rolling walker  -CW     Transfer, Safety Issues  weight-shifting ability decreased;loses balance backward  -CW     Recorded by  [MÓNICA] Samina Nash PTA     Therapy Exercises    Right Lower Extremity   AAROM:;AROM:;10 reps;supine  -CW    Left Lower Extremity   PROM:;AAROM:;10 reps;supine  -CW    Recorded by   [CW] Samina Nash PTA    Positioning and Restraints    Pre-Treatment Position   in bed  -CW    Post Treatment Position   bed  -CW    In Bed   side lying left;call light within reach;encouraged to call for assist;exit alarm on;pillow between legs  -CW    Recorded by   [CW] Samina Nash PTA      02/05/18 1333 02/05/18 1105 02/04/18 1500     Rehab Assessment/Intervention    Discipline occupational therapy assistant  -TS physical therapy assistant  -CW occupational therapy assistant  -CJ    Document Type therapy note (daily note)  -TS therapy note (daily note)  -CW therapy note (daily note)  -CJ    Subjective Information agree to therapy;complains of;pain  -TS agree to therapy;complains of;pain;fatigue  -CW     Patient Effort, Rehab Treatment good  -TS adequate  -CW     Treatment Not Performed   patient/family declined treatment  -CJ    Treatment Not Performed, Comment   --   Pt. refused 2x, states that her son will be here shortly!  -CJ    Precautions/Limitations fall precautions;hip precautions- left  -TS fall precautions;hip precautions- left  -CW     Specific Treatment Considerations  Requires max encouragement to participate  -CW     Recorded by [TS] PRASANTH Valdez/RITA [CW] Samina Nash PTA [CJ] PRASANTH Sanchez/L    Pain Assessment    Pain Assessment 0-10  -TS Tello-Baker FACES  -CW     Tello-Baker FACES Pain Rating  10  -CW     Pain Score 4  -TS      Pain Type Acute pain;Surgical pain  -TS Acute pain  -CW     Pain Location Hip  -TS Hip  -CW     Pain Orientation Right;Left  -TS Left  -CW     Pain Descriptors Aching  -TS      Pain Frequency Intermittent  -TS Intermittent   0/10 before movement  -CW     Pain Intervention(s) Repositioned  -TS Repositioned;Medication (See MAR)  -CW     Response to Interventions pt states that her R hip has more pain than L hip  -TS tolerated  -CW     Recorded by [TS] PRASANTH Valdez/RITA [CW] Samina Nash PTA     Bed Mobility, Assessment/Treatment    Bed Mobility, Assistive Device bed rails;draw sheet  -TS draw sheet  -CW     Bed Mobility, Roll Left, [place] minimum assist (75% patient effort);2 person assist required  -TS      Bed Mob, Supine to Sit, [place]  maximum assist (25% patient effort);2 person assist required;verbal cues required  -CW     Bed Mob, Sit to Supine,  [place]  maximum assist (25% patient effort);2 person assist required;verbal cues required  -CW     Bed Mobility, Safety Issues  cognitive deficits limit understanding;decreased use of legs for bridging/pushing  -CW     Bed Mobility, Impairments  pain;strength decreased  -CW     Recorded by [TS] LÓPEZ Valdez [CW] Samina Nash PTA     Transfer Assessment/Treatment    Transfers, Sit-Stand [place]  moderate assist (50% patient effort);maximum assist (25% patient effort);2 person assist required;verbal cues required  -CW     Transfers, Stand-Sit [place]  moderate assist (50% patient effort);maximum assist (25% patient effort);2 person assist required;verbal cues required  -CW     Transfers, Sit-Stand-Sit, Assist Device  rolling walker  -CW     Transfer, Safety Issues  loses balance backward  -CW     Transfer, Impairments  strength decreased;impaired balance;pain  -CW     Transfer, Comment  Stood while nsg checked skin and changed brief.  Leans heavily posterior  -CW     Recorded by  [CW] Samina Nash PTA     Lower Body Dressing Assessment/Training    LB Dressing Assess/Train, Clothing Type doffing:   brief  -TS      LB Dressing Assess/Train, Position supine  -TS      LB Dressing Assess/Train, [place] maximum assist (25% patient effort)  -TS      LB Dressing Assess/Train, Impairments pain;ROM decreased  -TS      Recorded by [TS] LÓPEZ Valdez      Toileting Assessment/Training    Toileting Assess/Train, Position supine   fowlers  -TS      Toileting Assess/Train, Indepen Level supervision required;maximum assist (25% patient effort)  -TS      Toileting Assess/Train, Comment S for hygiene to jacey area, max A for hygiene to buttocks  -TS      Recorded by [TS] LÓPEZ Valdez      Motor Skills/Interventions    Additional Documentation  Balance Skills Training (Group)  -CW     Recorded by  [CW] Samina Nash PTA     Balance Skills Training     Sitting-Level of Assistance  Close supervision;Contact guard  -CW     Sitting-Balance Support  Feet supported  -CW     Standing-Level of Assistance  Maximum assistance;x2  -CW     Static Standing Balance Support  assistive device  -CW     Standing Balance # of Minutes  --   leans heavily posterior  -CW     Recorded by  [CW] Samina Nash PTA     Therapy Exercises    Bilateral Lower Extremities  AROM:;5 reps;sitting;ankle pumps/circles;hip flexion;LAQ  -CW     Recorded by  [CW] Samina aNsh PTA     Positioning and Restraints    Pre-Treatment Position in bed  -TS in bed  -CW     Post Treatment Position bed  -TS bed  -CW     In Bed side lying left;call light within reach;encouraged to call for assist;exit alarm on;side rails up x2;pillow between legs  -TS supine;call light within reach;encouraged to call for assist;ABD pillow;heels elevated  -CW     Recorded by [TS] PRASANTH Valdez/RITA [CW] Samina Nash PTA       02/04/18 1342 02/04/18 0958 02/03/18 1130    Rehab Assessment/Intervention    Discipline physical therapy assistant  -NW physical therapy assistant  -NW     Document Type therapy note (daily note)  -NW therapy note (daily note)  -NW     Subjective Information complains of;pain;fatigue  -NW agree to therapy;complains of;pain  -NW     Patient Effort, Rehab Treatment adequate  -NW adequate  -NW     Precautions/Limitations fall precautions;hip precautions- left  -NW fall precautions;hip precautions- left  -NW     Specific Treatment Considerations encourged pt to work w/PT, but wanted to be left alone  -NW pt very anxious needs encouragement to work w/ PT, was more worried about her son coming and she wanted to see him,  -NW     Recorded by [NW] Jaclyn Castano PTA [NW] Jaclyn Castano PTA     Pain Assessment    Pain Assessment Tello-Baker FACES  -NW Tello-Baker FACES  -NW     Tello-Baker FACES Pain Rating 10  -NW 8  -NW     Pain Location Hip  -NW Hip  -NW     Pain Orientation Left  -NW  Left  -NW     Pain Frequency Constant/continuous  -NW Constant/continuous  -NW     Pain Intervention(s) Medication (See MAR);Repositioned  -NW Repositioned;Medication (See MAR)  -NW     Recorded by [NW] aJclyn Castano PTA [NW] Jaclyn Castano PTA     Swallow Assessment/Intervention    Additional Documentation   Dysphagia/Swallow Intervention (Group)  -MB    Recorded by   [MB] Vamsi Jacinto CCC-SLP    Dysphagia/Swallow Intervention    Dysphagia/Swallow Therapeutic Feed   Pt will tolerate trials of current diet with no overt s/s of aspiration.  -MB    Recorded by   [MB] Vamsi Jacinto CCC-SLP    Bed Mobility, Assessment/Treatment    Bed Mobility, Assistive Device draw sheet  -NW draw sheet  -NW     Bed Mob, Supine to Sit, [place] verbal cues required;maximum assist (25% patient effort);2 person assist required  -NW verbal cues required;moderate assist (50% patient effort);maximum assist (25% patient effort);2 person assist required  -NW     Bed Mob, Sit to Supine, [place] verbal cues required;maximum assist (25% patient effort);2 person assist required  -NW verbal cues required;maximum assist (25% patient effort);2 person assist required  -NW     Bed Mobility, Safety Issues cognitive deficits limit understanding  -NW cognitive deficits limit understanding;decreased use of arms for pushing/pulling;decreased use of legs for bridging/pushing  -NW     Bed Mobility, Impairments pain;strength decreased  -NW strength decreased;pain  -NW     Recorded by [NW] Jaclyn Castano PTA [NW] Jaclyn Castano PTA     Transfer Assessment/Treatment    Transfers, Sit-Stand [place] verbal cues required;moderate assist (50% patient effort);maximum assist (25% patient effort);2 person assist required  -NW verbal cues required;moderate assist (50% patient effort);2 person assist required  -NW     Transfers, Stand-Sit [place] verbal cues required;moderate assist (50% patient effort)  -NW verbal cues  "required;moderate assist (50% patient effort)  -NW     Transfers, Sit-Stand-Sit, Assist Device rolling walker  -NW rolling walker  -NW     Transfer, Safety Issues  step length decreased;weight-shifting ability decreased  -NW     Transfer, Impairments  pain;strength decreased  -NW     Transfer, Comment attempt to take sidesteps this pm but pt unable \"please just lay me down\" encouraged pt to keep trying but adamently refused so got pt laid bk down  -NW      Recorded by [NW] Jaclyn Castano PTA [NW] Jaclyn Castano PTA     Gait Assessment/Treatment    Gait, [place] Level  moderate assist (50% patient effort);2 person assist required  -NW     Gait, Assistive Device  rolling walker  -NW     Gait, Distance (Feet)  --   few sidesteps towards HOB  -NW     Gait, Gait Deviations  step length decreased;stride length decreased  -NW     Gait, Safety Issues  step length decreased;balance decreased during turns  -NW     Gait, Impairments  pain;strength decreased  -NW     Gait, Comment  pt very anxious needs lots of cues for safety  -NW     Recorded by  [NW] Jaclyn Castano PTA     Therapy Exercises    Left Lower Extremity  --   reviewed ex's but pt refused to follow through w/ them  -NW     Recorded by  [NW] Jaclyn Castano PTA     Positioning and Restraints    Pre-Treatment Position in bed  -NW in bed  -NW     Post Treatment Position bed  -NW bed  -NW     In Bed fowlers;call light within reach;encouraged to call for assist;notified nsg  -NW fowlers;call light within reach;encouraged to call for assist;exit alarm on;notified nsg  -NW     Recorded by [NW] Jaclyn Castano PTA [NW] Jaclyn Castano PTA       User Key  (r) = Recorded By, (t) = Taken By, (c) = Cosigned By    Initials Name Effective Dates    JANETH Jacinto, [facility]-SLP 08/02/16 -     CJ Cristian Carias, RADER/L 08/02/16 -     TS Nallely Tripp, RADER/L 08/02/16 -     CW Samina Nash, PTA 06/22/15 -     NW Jaclyn Castano PTA 08/02/16 -     "               IP SLP Goals       02/06/18 1013 02/05/18 1446 02/03/18 1418    Safely Consume Diet    Safely Consume Diet- SLP, Date Established   02/03/18  -MB    Safely Consume Diet- SLP, Time to Achieve   2 days  -MB    Safely Consume Diet- SLP, Additional Goal   Pt will tolerate trials of current diet with no overt s/s of aspiration.  -MB    Safely Consume Diet- SLP, Date Goal Reviewed 02/06/18  -MB      Safely Consume Diet- SLP, Outcome   goal ongoing  -MB    Cognitive Linguistic- Improve Safety and Awareness    Cognitive Linguistic Improve Safety and Awareness- SLP, Date Established  02/05/18  -CS (r) MM (t) CS (c)     Cognitive Linguistic Improve Safety and Awareness- SLP, Time to Achieve  by discharge  -CS (r) MM (t) CS (c)     Cognitive Linguistic Improve Safety and Awareness- SLP, Activity Level  Patient will improve attention skills for increased safety in environment;Patient will improve orientation for increased safety in environment;Patient will improve memory skills for increased safety in environment;Patient will improve functional problem solving skills for increased safety in environment  -CS (r) MM (t) CS (c)     Cognitive Linguistic Improve Safety and Awareness- SLP, Date Goal Reviewed 02/06/18  -MB 02/05/18  -CS (r) MM (t) CS (c)     Cognitive Linguistic Improve Safety and Awareness- SLP, Outcome  goal ongoing  -CS (r) MM (t) CS (c)       User Key  (r) = Recorded By, (t) = Taken By, (c) = Cosigned By    Initials Name Provider Type    JANETH Jacinto, CCC-SLP Speech and Language Pathologist    LILLIANA [place], MS CCC-SLP Speech and Language Pathologist    MM Isabella Soliman, Speech Therapy Student Speech Therapy Student          EDUCATION  The patient has been educated in the following areas:   Communication Impairment.    SLP Recommendation and Plan                                           Plan of Care Review  Plan Of Care Reviewed With: patient  Progress: no change  Outcome  Summary/Follow up Plan: Pt completed trials of thin water via cup with no overt s/s of aspiration. Pt had complaints about food, so SLP had an extensive conversation about pt preferences. She appeared generally displeased, stating that everything she has received so far has not been good. SLP notified CTY person for 3A of pt preferences that would be listed in the comments section of her diet order.       SLP Outcome Measures (last 72 hours)      SLP Outcome Measures       02/05/18 1400 02/03/18 1400       SLP Outcome Measures    Outcome Measure Used? Adult NOMS  -CS (r) MM (t) CS (c) Adult NOMS  -MB     OTHER    SLP Diagnoses Cognitive-Communication Disorder  -CS (r) MM (t) CS (c)      FCM Scores    FCM Chosen Memory  -CS (r) MM (t) CS (c) Swallowing  -MB     Swallowing FCM Score  6  -MB     Memory FCM Score 4  -CS (r) MM (t) CS (c)        User Key  (r) = Recorded By, (t) = Taken By, (c) = Cosigned By    Initials Name Effective Dates    KATARZYNA Roca 08/02/16 -     CS Wally [place], MS ZAY-SLP 06/28/17 -     MM Isabella Soliman, Speech Therapy Student 12/12/17 -              Time Calculation:         Time Calculation- SLP       02/06/18 1014          Time Calculation- SLP    SLP Start Time 0939  -MB      SLP Stop Time 1003  -MB      SLP Time Calculation (min) 24 min  -MB      SLP Received On 02/06/18  -MB        User Key  (r) = Recorded By, (t) = Taken By, (c) = Cosigned By    Initials Name Provider Type    JANETH Jacinto CCC-SLP Speech and Language Pathologist          Therapy Charges for Today     Code Description Service Date Service Provider Modifiers Qty    94618813150 HC ST TREATMENT SPEECH 1 2/6/2018 KATARZYNA Strickland, KX 1    [id number] HC ST TREATMENT SWALLOW 1 2/6/2018 KATARZYNA Strickland, KX 1          SLP G-Codes  SLP NOMS Used?: Yes  Functional Limitations: Memory  Swallow Current Status (): At least 1 percent but less than 20 percent impaired,  limited or restricted  Swallow Goal Status (): At least 1 percent but less than 20 percent impaired, limited or restricted  Memory Current Status (): At least 40 percent but less than 60 percent impaired, limited or restricted  Memory Goal Status (): At least 40 percent but less than 60 percent impaired, limited or restricted  Memory Discharge Status (): At least 40 percent but less than 60 percent impaired, limited or restricted      Vamsi Jacinto CCC-SLP  2/6/2018

## 2018-02-06 NOTE — THERAPY TREATMENT NOTE
Acute Care - Physical Therapy Treatment Note  [facility]     Patient Name: Andres Walter  : 1929  MRN: [id number]  Today's Date: 2018  Onset of Illness/Injury or Date of Surgery Date: 18  Date of Referral to PT: 18  Referring Physician: Dr. Barger    Admit Date: 2018    Visit Dx:    ICD-10-CM ICD-9-CM   1. Fall, initial encounter W19.XXXA E888.9   2. Closed fracture of left hip, initial encounter S72.002A 820.8   3. Pneumonia of both lungs due to infectious organism, unspecified part of lung J18.9 483.8   4. Acute UTI N39.0 599.0   5. Abnormality of gait and mobility R26.9 781.2   6. Dysphagia, unspecified type R13.10 787.20   7. Impaired mobility and ADLs Z74.09 799.89   8. Impaired cognition R41.89 294.9     Patient Active Problem List   Diagnosis   • Fall               Adult Rehabilitation Note       18 1140 18 1034 18 0939    Rehab Assessment/Intervention    Discipline physical therapy assistant  -CW occupational therapy assistant  -TS speech language pathologist  -MB    Document Type therapy note (daily note)  -CW --  -TS therapy note (daily note)  -MB    Subjective Information agree to therapy;complains of;pain  -CW  agree to therapy  -MB    Patient Effort, Rehab Treatment fair  -CW  adequate  -MB    Precautions/Limitations fall precautions;hip precautions- left  -CW      Recorded by [CW] Samina Nash PTA [TS] PRASANTH Valdez/RITA [MB] Vamsi Jacinto CCC-SLP    Pain Assessment    Pain Assessment Tello-Cabral FACES  -CW      Tello-Baker FACES Pain Rating 4  -CW      Pain Type Acute pain;Surgical pain  -CW      Pain Location Hip  -CW      Pain Orientation Left  -CW      Pain Frequency Intermittent  -CW      Pain Intervention(s) Repositioned  -CW      Response to Interventions tolerated  -CW      Recorded by [CW] Samina Nash PTA      Improve memory skills    Memory Skills Progress   40%;without cues  -MB    Comments: Improve memory skills   Pt  was able to tell SLP some of the things that were on her breakfast tray. She repeated herself 3x about getting one Honey Nut Cheerio and one regular Cheerio package. She appeared to have no memory that she had already told the therapist that information each time.   -MB    Recorded by   [MB] Vamsi Jacinto CCC-SLP    Dysphagia/Swallow Intervention    Dysphagia/Swallow Therapeutic Feed   Pt completed trials of thin water via cup with no overt s/s of aspiration. Pt had complaints about food, so SLP had an extensive conversation about pt preferences. She appeared generally displeased, stating that everything she has received so far has not been good. SLP notified CTY person for 3A of pt preferences that would be listed in the comments section of her diet order.  -MB    Recorded by   [MB] Vamsi Jacinto CCC-SLP    Bed Mobility, Assessment/Treatment    Bed Mob, Sit to Supine, [place] maximum assist (25% patient effort);2 person assist required;verbal cues required  -CW      Bed Mobility, Safety Issues cognitive deficits limit understanding  -CW      Bed Mobility, Impairments strength decreased;pain  -CW      Recorded by [CW] Samina Nash PTA      Transfer Assessment/Treatment    Transfers, Chair-Bed [place] maximum assist (25% patient effort);2 person assist required;verbal cues required;hand held assist  -CW      Transfers, Sit-Stand [place] maximum assist (25% patient effort);2 person assist required;hand held assist  -CW      Transfers, Stand-Sit [place] maximum assist (25% patient effort);2 person assist required  -CW      Transfer, Safety Issues loses balance backward  -CW      Transfer, Impairments strength decreased;postural control impaired;pain  -CW      Transfer, Comment Stand pivot chair to bed - hard posterior lean  -CW      Recorded by [CW] Samina Nash PTA      Positioning and Restraints    Pre-Treatment Position sitting in chair/recliner  -CW      Post Treatment  Position bed  -CW      In Bed notified nsg;supine;call light within reach;exit alarm on;pillow between legs  -CW      Recorded by [CW] Samina Nash PTA        02/06/18 0900 02/05/18 1505 02/05/18 1333    Rehab Assessment/Intervention    Discipline physical therapy assistant  -CW physical therapy assistant  -CW occupational therapy assistant  -TS    Document Type therapy note (daily note)  -CW therapy note (daily note)  -CW therapy note (daily note)  -TS    Subjective Information agree to therapy;complains of;pain;fatigue;swelling   B feet swollen  -CW agree to therapy;complains of;weakness;fatigue;pain  -CW agree to therapy;complains of;pain  -TS    Patient Effort, Rehab Treatment fair  -CW fair  -CW good  -TS    Precautions/Limitations fall precautions;hip precautions- left  -CW fall precautions;hip precautions- left  -CW fall precautions;hip precautions- left  -TS    Recorded by [CW] Samina Nash PTA [CW] Samina Nash PTA [TS] PRASANTH Valdez/L    Pain Assessment    Pain Assessment Tello-Cabral FACES  -CW Tello-Baker FACES  -CW 0-10  -TS    Tello-Cabral FACES Pain Rating 4  -CW 4  -CW     Pain Score   4  -TS    Pain Type Acute pain;Surgical pain  -CW Acute pain;Surgical pain  -CW Acute pain;Surgical pain  -TS    Pain Location Hip  -CW Hip  -CW Hip  -TS    Pain Orientation Left  -CW Right;Left  -CW Right;Left  -TS    Pain Descriptors Aching  -CW Aching  -CW Aching  -TS    Pain Frequency Intermittent  -CW Intermittent  -CW Intermittent  -TS    Pain Intervention(s) Repositioned  -CW Repositioned  -CW Repositioned  -TS    Response to Interventions tolerated  -CW tolerated  -CW pt states that her R hip has more pain than L hip  -TS    Multiple Pain Sites Yes  -CW      Recorded by [CW] Samnia Nash PTA [CW] Samina Nash PTA [TS] PRASANTH Valdez/L    Pain 2    Tello-Baker FACES Pain Rating 2 6  -CW      Pain Type 2 Acute pain  -CW      Pain Location 2 Foot  -CW      Pain  "Orientation 2 Right;Left  -CW      Pain Descriptors 2 Tender   \"hurts\"  -CW      Pain Frequency 2 Constant/continuous  -CW      Pain Intervention(s) 2 Repositioned  -CW      Response to Interventions 2 tolerated  -CW      Recorded by [CW] Samina Nash PTA      Cognitive Assessment/Intervention    Current Cognitive/Communication Assessment impaired  -CW      Recorded by [CW] Samina Nash PTA      Bed Mobility, Assessment/Treatment    Bed Mobility, Assistive Device head of bed elevated;draw sheet  -CW  bed rails;draw sheet  -TS    Bed Mobility, Roll Left, [place]   minimum assist (75% patient effort);2 person assist required  -TS    Bed Mob, Supine to Sit, [place] maximum assist (25% patient effort);2 person assist required  -CW      Bed Mob, Sit to Supine, [place] --   in chair  -CW      Bed Mobility, Safety Issues cognitive deficits limit understanding;decreased use of legs for bridging/pushing  -CW      Bed Mobility, Impairments strength decreased;impaired balance;pain  -CW      Recorded by [CW] Samina Nash PTA  [TS] PRASANTH Valdez/L    Transfer Assessment/Treatment    Transfers, Bed-Chair [place] maximum assist (25% patient effort);2 person assist required;verbal cues required  -CW      Transfers, Bed-Chair-Bed, Assist Device rolling walker  -CW      Transfers, Sit-Stand [place] maximum assist (25% patient effort);2 person assist required;verbal cues required  -CW      Transfers, Stand-Sit [place] maximum assist (25% patient effort);2 person assist required;verbal cues required  -CW      Transfers, Sit-Stand-Sit, Assist Device rolling walker  -CW      Transfer, Safety Issues weight-shifting ability decreased;loses balance backward  -CW      Transfer, Impairments impaired balance;decreased flexibility;ROM decreased;pain  -CW      Transfer, Comment Hard posterior lean with feet sliding forward  -CW      Recorded by [CW] Samina Nash PTA      Gait " Assessment/Treatment    Gait, [place] maximum assist (25% patient effort);2 person assist required;verbal cues required  -CW      Gait, Assistive Device rolling walker  -CW      Gait, Distance (Feet) 2  -CW      Gait, Safety Issues loses balance backward  -CW      Gait, Impairments decreased flexibility;ROM decreased;impaired balance;pain  -CW      Gait, Comment Hard posterior lean with feet sliding forward.  -CW      Recorded by [CW] Samina Nash PTA      Lower Body Dressing Assessment/Training    LB Dressing Assess/Train, Clothing Type   doffing:   brief  -TS    LB Dressing Assess/Train, Position   supine  -TS    LB Dressing Assess/Train, [place]   maximum assist (25% patient effort)  -TS    LB Dressing Assess/Train, Impairments   pain;ROM decreased  -TS    Recorded by   [TS] LÓPEZ Valdez    Toileting Assessment/Training    Toileting Assess/Train, Position   supine   fowlers  -TS    Toileting Assess/Train, Indepen Level   supervision required;maximum assist (25% patient effort)  -TS    Toileting Assess/Train, Comment   S for hygiene to jacey area, max A for hygiene to buttocks  -TS    Recorded by   [TS] LÓPEZ Valdez    Therapy Exercises    Right Lower Extremity AAROM:;AROM:;15 reps   reclined in chair  -CW AAROM:;AROM:;10 reps;supine  -CW     Left Lower Extremity PROM:;AAROM:;15 reps   reclined in chair  -CW PROM:;AAROM:;10 reps;supine  -CW     Recorded by [CW] Samina Nash PTA [CW] Samina Nash PTA     Positioning and Restraints    Pre-Treatment Position in bed  -CW in bed  -CW in bed  -TS    Post Treatment Position chair  -CW bed  -CW bed  -TS    In Bed  side lying left;call light within reach;encouraged to call for assist;exit alarm on;pillow between legs  -CW side lying left;call light within reach;encouraged to call for assist;exit alarm on;side rails up x2;pillow between legs  -TS    In Chair reclined;call light within reach;encouraged to call  for assist;notified nsg;pillow between legs  -CW      Recorded by [CW] Samina Nash PTA [CW] Samina Nash PTA [TS] PRASANTH Valdez/RITA      02/05/18 1105 02/04/18 1500 02/04/18 1342    Rehab Assessment/Intervention    Discipline physical therapy assistant  -CW occupational therapy assistant  -CJ physical therapy assistant  -NW    Document Type therapy note (daily note)  -CW therapy note (daily note)  -CJ therapy note (daily note)  -NW    Subjective Information agree to therapy;complains of;pain;fatigue  -CW  complains of;pain;fatigue  -NW    Patient Effort, Rehab Treatment adequate  -CW  adequate  -NW    Treatment Not Performed  patient/family declined treatment  -CJ     Treatment Not Performed, Comment  --   Pt. refused 2x, states that her son will be here shortly!  -     Precautions/Limitations fall precautions;hip precautions- left  -CW  fall precautions;hip precautions- left  -NW    Specific Treatment Considerations Requires max encouragement to participate  -CW  encourged pt to work w/PT, but wanted to be left alone  -NW    Recorded by [CW] Samina Nash PTA [CJ] PRASANTH Sanchez/RITA [NW] Jaclyn Castano PTA    Pain Assessment    Pain Assessment Tello-Cabral FACES  -CW  Tello-Cabral FACES  -NW    Tello-Baker FACES Pain Rating 10  -CW  10  -NW    Pain Type Acute pain  -CW      Pain Location Hip  -CW  Hip  -NW    Pain Orientation Left  -CW  Left  -NW    Pain Frequency Intermittent   0/10 before movement  -CW  Constant/continuous  -NW    Pain Intervention(s) Repositioned;Medication (See MAR)  -CW  Medication (See MAR);Repositioned  -NW    Response to Interventions tolerated  -CW      Recorded by [CW] Samina Nash PTA  [NW] Jaclyn Castano PTA    Bed Mobility, Assessment/Treatment    Bed Mobility, Assistive Device draw sheet  -CW  draw sheet  -NW    Bed Mob, Supine to Sit, [place] maximum assist (25% patient effort);2 person assist required;verbal cues required  -CW   "verbal cues required;maximum assist (25% patient effort);2 person assist required  -NW    Bed Mob, Sit to Supine, [place] maximum assist (25% patient effort);2 person assist required;verbal cues required  -CW  verbal cues required;maximum assist (25% patient effort);2 person assist required  -NW    Bed Mobility, Safety Issues cognitive deficits limit understanding;decreased use of legs for bridging/pushing  -CW  cognitive deficits limit understanding  -NW    Bed Mobility, Impairments pain;strength decreased  -CW  pain;strength decreased  -NW    Recorded by [CW] Samina Nash PTA  [NW] Jaclyn Castano PTA    Transfer Assessment/Treatment    Transfers, Sit-Stand [place] moderate assist (50% patient effort);maximum assist (25% patient effort);2 person assist required;verbal cues required  -CW  verbal cues required;moderate assist (50% patient effort);maximum assist (25% patient effort);2 person assist required  -NW    Transfers, Stand-Sit [place] moderate assist (50% patient effort);maximum assist (25% patient effort);2 person assist required;verbal cues required  -CW  verbal cues required;moderate assist (50% patient effort)  -NW    Transfers, Sit-Stand-Sit, Assist Device rolling walker  -CW  rolling walker  -NW    Transfer, Safety Issues loses balance backward  -CW      Transfer, Impairments strength decreased;impaired balance;pain  -CW      Transfer, Comment Stood while nsg checked skin and changed brief.  Leans heavily posterior  -CW  attempt to take sidesteps this pm but pt unable \"please just lay me down\" encouraged pt to keep trying but adamently refused so got pt laid bk down  -NW    Recorded by [CW] Samina Nash PTA  [NW] Jaclyn Castano PTA    Motor Skills/Interventions    Additional Documentation Balance Skills Training (Group)  -CW      Recorded by [CW] Samina Nash PTA      Balance Skills Training    Sitting-Level of Assistance Close supervision;Contact guard  -CW      " Sitting-Balance Support Feet supported  -CW      Standing-Level of Assistance Maximum assistance;x2  -CW      Static Standing Balance Support assistive device  -CW      Standing Balance # of Minutes --   leans heavily posterior  -CW      Recorded by [CW] Samina Nash PTA      Therapy Exercises    Bilateral Lower Extremities AROM:;5 reps;sitting;ankle pumps/circles;hip flexion;LAQ  -CW      Recorded by [CW] Samina Nash PTA      Positioning and Restraints    Pre-Treatment Position in bed  -CW  in bed  -NW    Post Treatment Position bed  -CW  bed  -NW    In Bed supine;call light within reach;encouraged to call for assist;ABD pillow;heels elevated  -CW  fowlers;call light within reach;encouraged to call for assist;notified nsg  -NW    Recorded by [CW] Samina Nash PTA  [NW] Jaclyn Castano PTA      02/04/18 0958          Rehab Assessment/Intervention    Discipline physical therapy assistant  -NW      Document Type therapy note (daily note)  -NW      Subjective Information agree to therapy;complains of;pain  -NW      Patient Effort, Rehab Treatment adequate  -NW      Precautions/Limitations fall precautions;hip precautions- left  -NW      Specific Treatment Considerations pt very anxious needs encouragement to work w/ PT, was more worried about her son coming and she wanted to see him,  -NW      Recorded by [NW] Jaclyn Castano PTA      Pain Assessment    Pain Assessment Tello-Baker FACES  -NW      Tello-Baker FACES Pain Rating 8  -NW      Pain Location Hip  -NW      Pain Orientation Left  -NW      Pain Frequency Constant/continuous  -NW      Pain Intervention(s) Repositioned;Medication (See MAR)  -NW      Recorded by [NW] Jaclyn Castano PTA      Bed Mobility, Assessment/Treatment    Bed Mobility, Assistive Device draw sheet  -NW      Bed Mob, Supine to Sit, [place] verbal cues required;moderate assist (50% patient effort);maximum assist (25% patient effort);2 person assist required  -NW       Bed Mob, Sit to Supine, [place] verbal cues required;maximum assist (25% patient effort);2 person assist required  -NW      Bed Mobility, Safety Issues cognitive deficits limit understanding;decreased use of arms for pushing/pulling;decreased use of legs for bridging/pushing  -NW      Bed Mobility, Impairments strength decreased;pain  -NW      Recorded by [NW] Jaclyn Castano PTA      Transfer Assessment/Treatment    Transfers, Sit-Stand [place] verbal cues required;moderate assist (50% patient effort);2 person assist required  -NW      Transfers, Stand-Sit [place] verbal cues required;moderate assist (50% patient effort)  -NW      Transfers, Sit-Stand-Sit, Assist Device rolling walker  -NW      Transfer, Safety Issues step length decreased;weight-shifting ability decreased  -NW      Transfer, Impairments pain;strength decreased  -NW      Recorded by [NW] Jaclyn Castano PTA      Gait Assessment/Treatment    Gait, [place] Level moderate assist (50% patient effort);2 person assist required  -NW      Gait, Assistive Device rolling walker  -NW      Gait, Distance (Feet) --   few sidesteps towards HOB  -NW      Gait, Gait Deviations step length decreased;stride length decreased  -NW      Gait, Safety Issues step length decreased;balance decreased during turns  -NW      Gait, Impairments pain;strength decreased  -NW      Gait, Comment pt very anxious needs lots of cues for safety  -NW      Recorded by [NW] Jaclyn Castano PTA      Therapy Exercises    Left Lower Extremity --   reviewed ex's but pt refused to follow through w/ them  -NW      Recorded by [NW] Jaclyn Castano PTA      Positioning and Restraints    Pre-Treatment Position in bed  -NW      Post Treatment Position bed  -NW      In Bed fowlers;call light within reach;encouraged to call for assist;exit alarm on;notified nsg  -NW      Recorded by [NW] Jaclyn Castano PTA        User Key  (r) = Recorded By, (t) = Taken By, (c) = Cosigned By     Initials Name Effective Dates    JANETH Jacinto, [facility]-SLP 08/02/16 -     CJ Cristian DESTINEE Carias, RADER/L 08/02/16 -     TS Nallely Tripp, RADER/L 08/02/16 -     CW Samina Nash, PTA 06/22/15 -     NW Jaclyn Castano, PTA 08/02/16 -                 IP PT Goals       02/03/18 1229          Bed Mobility PT LTG    Bed Mobility PT LTG, Date Established 02/03/18  -TB      Bed Mobility PT LTG, Time to Achieve by discharge  -TB      Bed Mobility PT LTG, Activity Type all bed mobility  -TB      Bed Mobility PT LTG, [place] Level supervision required  -TB      Transfer Training PT LTG    Transfer Training PT LTG, Date Established 02/03/18  -TB      Transfer Training PT LTG, Time to Achieve by discharge  -TB      Transfer Training PT LTG, Activity Type all transfers  -TB      Transfer Training PT LTG, [place] Level contact guard assist  -TB      Transfer Training PT LTG, Assist Device walker, rolling  -TB      Gait Training PT LTG    Gait Training Goal PT LTG, Date Established 02/03/18  -TB      Gait Training Goal PT LTG, Time to Achieve by discharge  -TB      Gait Training Goal PT LTG, [place] Level contact guard assist  -TB      Gait Training Goal PT LTG, Assist Device walker, rolling  -TB      Gait Training Goal PT LTG, Distance to Achieve 20  -TB      Gait Training Goal PT LTG, Additional Goal able to stand upright without losing balance posterior with min limping  -TB      Range of Motion PT LTG    Range of Motion Goal PT LTG, Date Established 02/03/18  -TB      Range of Motion Goal PT LTG, Time to Achieve by discharge  -TB      Range of Motion Goal PT LTG, PROM Measure benja ankle DF >0 deg  -TB        User Key  (r) = Recorded By, (t) = Taken By, (c) = Cosigned By    Initials Name Provider Type    TB Josias White PT Physical Therapist          Physical Therapy Education     Title: PT OT SLP Therapies (Active)     Topic: Physical Therapy (Active)     Point: Mobility training (Active)     Learning Progress Summary    Learner Readiness Method Response Comment Documented by Status   Patient Acceptance E,D NR Bed mobility, transfers, benefit of activity, plan of care CW 02/06/18 1019 Active    Acceptance E,D NR Bed mobility, transfers, plan of care, benefit of activity CW 02/05/18 1143 Active    Acceptance E VU,NR benefits of activity NW 02/04/18 1037 Done    Acceptance E NR  TB 02/03/18 1227 Active   Family Acceptance E NR  TB 02/03/18 1227 Active               Point: Home exercise program (Active)    Learning Progress Summary    Learner Readiness Method Response Comment Documented by Status   Patient Acceptance E,D NR Bed mobility, transfers, benefit of activity, plan of care CW 02/06/18 1019 Active    Acceptance E,D NR Bed mobility, transfers, plan of care, benefit of activity CW 02/05/18 1143 Active    Acceptance E NR  TB 02/03/18 1227 Active   Family Acceptance E NR   02/03/18 1227 Active               Point: Body mechanics (Active)    Learning Progress Summary    Learner Readiness Method Response Comment Documented by Status   Patient Acceptance E,D NR Bed mobility, transfers, benefit of activity, plan of care CW 02/06/18 1019 Active    Acceptance E,D NR Bed mobility, transfers, plan of care, benefit of activity CW 02/05/18 1143 Active    Acceptance E NR  TB 02/03/18 1227 Active   Family Acceptance E NR   02/03/18 1227 Active               Point: Precautions (Active)    Learning Progress Summary    Learner Readiness Method Response Comment Documented by Status   Patient Acceptance E,D NR Bed mobility, transfers, benefit of activity, plan of care CW 02/06/18 1019 Active    Acceptance E,D NR Bed mobility, transfers, plan of care, benefit of activity CW 02/05/18 1143 Active    Acceptance E NR  TB 02/03/18 1227 Active   Family Acceptance E NR   02/03/18 1227 Active                      User Key     Initials Effective Dates Name Provider Type Discipline     08/02/16 -  Josias White, PT  Physical Therapist PT    CW 06/22/15 -  Samina Nash PTA Physical Therapy Assistant PT    NW 08/02/16 -  Jaclyn Castano PTA Physical Therapy Assistant PT                    PT Recommendation and Plan  Anticipated Equipment Needs At Discharge: gait belt, two wheeled walker  Anticipated Discharge Disposition: transitional care  Planned Therapy Interventions: strengthening, ROM (Range of Motion), balance training, bed mobility training, gait training, transfer training  PT Frequency: 2 times/day  Plan of Care Review  Plan Of Care Reviewed With: patient  Progress: progress towards functional goals is fair  Outcome Summary/Follow up Plan: Pt requires max assist of 2 for bed mobility, transfers and gait. Pt has a hard posterior lean when standing and slides her feet forward while leaning back when trying to ambulate.  Pt will need continued therapy from SNF at discharge.          Outcome Measures       02/06/18 1000 02/05/18 1400 02/05/18 1100    How much help from another person do you currently need...    Turning from your back to your side while in flat bed without using bedrails? 2  -CW  2  -CW    Moving from lying on back to sitting on the side of a flat bed without bedrails? 2  -CW  2  -CW    Moving to and from a bed to a chair (including a wheelchair)? 2  -CW  1  -CW    Standing up from a chair using your arms (e.g., wheelchair, bedside chair)? 2  -CW  2  -CW    Climbing 3-5 steps with a railing? 1  -CW  1  -CW    To walk in hospital room? 2  -CW  1  -CW    AM-PAC 6 Clicks Score 11  -CW  9  -CW    How much help from another is currently needed...    Putting on and taking off regular lower body clothing?  2  -TS     Bathing (including washing, rinsing, and drying)  2  -TS     Toileting (which includes using toilet bed pan or urinal)  2  -TS     Putting on and taking off regular upper body clothing  3  -TS     Taking care of personal grooming (such as brushing teeth)  3  -TS     Eating meals  4  -TS      Score  16  -TS     Functional Assessment    Outcome Measure Options AM-PAC 6 Clicks Basic Mobility (PT)  -CW AM-PAC 6 Clicks Daily Activity (OT)  -TS AM-PAC 6 Clicks Basic Mobility (PT)  -CW      02/03/18 1400 02/03/18 1200       How much help from another person do you currently need...    Turning from your back to your side while in flat bed without using bedrails?  2  -TB     Moving from lying on back to sitting on the side of a flat bed without bedrails?  2  -TB     Moving to and from a bed to a chair (including a wheelchair)?  1  -TB     Standing up from a chair using your arms (e.g., wheelchair, bedside chair)?  2  -TB     Climbing 3-5 steps with a railing?  1  -TB     To walk in hospital room?  1  -TB     AM-PAC 6 Clicks Score  9  -TB     How much help from another is currently needed...    Putting on and taking off regular lower body clothing? 2  -AC (r) MK (t) AC (c)      Bathing (including washing, rinsing, and drying) 2  -AC (r) MK (t) AC (c)      Toileting (which includes using toilet bed pan or urinal) 2  -AC (r) MK (t) AC (c)      Putting on and taking off regular upper body clothing 3  -AC (r) MK (t) AC (c)      Taking care of personal grooming (such as brushing teeth) 3  -AC (r) MK (t) AC (c)      Eating meals 3  -AC (r) MK (t) AC (c)      Score 15  -AC (r) MK (t)      Functional Assessment    Outcome Measure Options AM-PAC 6 Clicks Daily Activity (OT)  -AC (r) MK (t) AC (c) AM-PAC 6 Clicks Basic Mobility (PT)  -TB       User Key  (r) = Recorded By, (t) = Taken By, (c) = Cosigned By    Initials Name Provider Type    TB Josias White, PT Physical Therapist    AC Дмитрий Jacobsen, OTR/L Occupational Therapist    TS Nallely Tripp, RADER/L Occupational Therapy Assistant    MÓNICA Nash, EAN Physical Therapy Assistant    DIONNE Cavazos, OT Student OT Student           Time Calculation:         PT Charges       02/06/18 1155 02/06/18 1026       Time Calculation    Start Time 1140  - 0945   -CW     Stop Time 1151  -CW 0940  -CW     Time Calculation (min) 11 min  -CW 40 min  -CW     PT Received On 02/06/18  -CW 02/06/18  -CW     PT Goal Re-Cert Due Date 02/13/18  -CW 02/13/18  -CW     Time Calculation- PT    Total Timed Code Minutes- PT 11 minute(s)  -CW 40 minute(s)  -CW       User Key  (r) = Recorded By, (t) = Taken By, (c) = Cosigned By    Initials Name Provider Type    CW Samina Nash PTA Physical Therapy Assistant          Therapy Charges for Today     Code Description Service Date Service Provider Modifiers Qty    70559733572 HC PT THERAPEUTIC ACT EA 15 MIN 2/5/2018 Samina Nash, PTA GP, KX 1    21649361985 HC PT THER PROC EA 15 MIN 2/5/2018 Samina Nash, PTA GP, KX 1    46450949021 HC PT THER PROC EA 15 MIN 2/5/2018 Samina Nash, PTA GP, KX 1    [id number] HC PT THERAPEUTIC ACT EA 15 MIN 2/6/2018 Samina Nash, PTA GP, KX 2    28143493254 HC PT THER PROC EA 15 MIN 2/6/2018 Samina Nash, PTA GP, KX 1    93040863593 HC PT THERAPEUTIC ACT EA 15 MIN 2/6/2018 Smaina Nash, PTA GP, KX 1          PT G-Codes  Outcome Measure Options: AM-PAC 6 Clicks Basic Mobility (PT)  Score: 9  Functional Limitation: Mobility: Walking and moving around  Mobility: Walking and Moving Around Current Status (): At least 60 percent but less than 80 percent impaired, limited or restricted  Mobility: Walking and Moving Around Goal Status (): At least 40 percent but less than 60 percent impaired, limited or restricted    Samina Nash PTA  2/6/2018

## 2018-02-06 NOTE — THERAPY TREATMENT NOTE
Acute Care - Physical Therapy Treatment Note  [facility]     Patient Name: Andres Walter  : 1929  MRN: [id number]  Today's Date: 2018  Onset of Illness/Injury or Date of Surgery Date: 18  Date of Referral to PT: 18  Referring Physician: Dr. Barger    Admit Date: 2018    Visit Dx:    ICD-10-CM ICD-9-CM   1. Fall, initial encounter W19.XXXA E888.9   2. Closed fracture of left hip, initial encounter S72.002A 820.8   3. Pneumonia of both lungs due to infectious organism, unspecified part of lung J18.9 483.8   4. Acute UTI N39.0 599.0   5. Abnormality of gait and mobility R26.9 781.2   6. Dysphagia, unspecified type R13.10 787.20   7. Impaired mobility and ADLs Z74.09 799.89   8. Impaired cognition R41.89 294.9     Patient Active Problem List   Diagnosis   • Fall               Adult Rehabilitation Note       18 0939 18 0900 18 1505    Rehab Assessment/Intervention    Discipline speech language pathologist  -MB physical therapy assistant  -CW physical therapy assistant  -CW    Document Type therapy note (daily note)  -MB therapy note (daily note)  -CW therapy note (daily note)  -CW    Subjective Information agree to therapy  -MB agree to therapy;complains of;pain;fatigue;swelling   B feet swollen  -CW agree to therapy;complains of;weakness;fatigue;pain  -CW    Patient Effort, Rehab Treatment adequate  -MB fair  -CW fair  -CW    Precautions/Limitations  fall precautions;hip precautions- left  -CW fall precautions;hip precautions- left  -CW    Recorded by [MB] Vamsi Jacinto CCC-SLP [CW] Samina Nash PTA [CW] Samina Nash PTA    Pain Assessment    Pain Assessment  Tello-Cabral FACES  -CW Tello-Baker FACES  -CW    Tello-Baker FACES Pain Rating  4  -CW 4  -CW    Pain Type  Acute pain;Surgical pain  -CW Acute pain;Surgical pain  -CW    Pain Location  Hip  -CW Hip  -CW    Pain Orientation  Left  -CW Right;Left  -CW    Pain Descriptors  Aching  -CW Aching  -CW    Pain  "Frequency  Intermittent  -CW Intermittent  -CW    Pain Intervention(s)  Repositioned  -CW Repositioned  -CW    Response to Interventions  tolerated  -CW tolerated  -CW    Multiple Pain Sites  Yes  -CW     Recorded by  [CW] Samina Nash PTA [CW] Samina Nash PTA    Pain 2    Tello-Baker FACES Pain Rating 2  6  -CW     Pain Type 2  Acute pain  -CW     Pain Location 2  Foot  -CW     Pain Orientation 2  Right;Left  -CW     Pain Descriptors 2  Tender   \"hurts\"  -CW     Pain Frequency 2  Constant/continuous  -CW     Pain Intervention(s) 2  Repositioned  -CW     Response to Interventions 2  tolerated  -CW     Recorded by  [CW] Samina Nash PTA     Cognitive Assessment/Intervention    Current Cognitive/Communication Assessment  impaired  -CW     Recorded by  [CW] Samina Nash PTA     Improve memory skills    Memory Skills Progress 40%;without cues  -MB      Comments: Improve memory skills Pt was able to tell SLP some of the things that were on her breakfast tray. She repeated herself 3x about getting one Honey Nut Cheerio and one regular Cheerio package. She appeared to have no memory that she had already told the therapist that information each time.   -MB      Recorded by [MB] Vamsi Jacinto CCC-SLP      Dysphagia/Swallow Intervention    Dysphagia/Swallow Therapeutic Feed Pt completed trials of thin water via cup with no overt s/s of aspiration. Pt had complaints about food, so SLP had an extensive conversation about pt preferences. She appeared generally displeased, stating that everything she has received so far has not been good. SLP notified CTY person for 3A of pt preferences that would be listed in the comments section of her diet order.  -MB      Recorded by [MB] Vamsi Jacinto CCC-SLP      Bed Mobility, Assessment/Treatment    Bed Mobility, Assistive Device  head of bed elevated;draw sheet  -CW     Bed Mob, Supine to Sit, [place]  maximum assist (25% patient effort);2 person " assist required  -CW     Bed Mob, Sit to Supine, [place]  --   in chair  -CW     Bed Mobility, Safety Issues  cognitive deficits limit understanding;decreased use of legs for bridging/pushing  -CW     Bed Mobility, Impairments  strength decreased;impaired balance;pain  -CW     Recorded by  [CW] Samina Nash PTA     Transfer Assessment/Treatment    Transfers, Bed-Chair [place]  maximum assist (25% patient effort);2 person assist required;verbal cues required  -CW     Transfers, Bed-Chair-Bed, Assist Device  rolling walker  -CW     Transfers, Sit-Stand [place]  maximum assist (25% patient effort);2 person assist required;verbal cues required  -CW     Transfers, Stand-Sit [place]  maximum assist (25% patient effort);2 person assist required;verbal cues required  -CW     Transfers, Sit-Stand-Sit, Assist Device  rolling walker  -CW     Transfer, Safety Issues  weight-shifting ability decreased;loses balance backward  -CW     Transfer, Impairments  impaired balance;decreased flexibility;ROM decreased;pain  -CW     Transfer, Comment  Hard posterior lean with feet sliding forward  -CW     Recorded by  [CW] Samina Nash PTA     Gait Assessment/Treatment    Gait, [place] Level  maximum assist (25% patient effort);2 person assist required;verbal cues required  -CW     Gait, Assistive Device  rolling walker  -CW     Gait, Distance (Feet)  2  -CW     Gait, Safety Issues  loses balance backward  -CW     Gait, Impairments  decreased flexibility;ROM decreased;impaired balance;pain  -CW     Gait, Comment  Hard posterior lean with feet sliding forward.  -CW     Recorded by  [CW] Samina Nash PTA     Therapy Exercises    Right Lower Extremity  AAROM:;AROM:;15 reps   reclined in chair  -CW AAROM:;AROM:;10 reps;supine  -CW    Left Lower Extremity  PROM:;AAROM:;15 reps   reclined in chair  -CW PROM:;AAROM:;10 reps;supine  -CW    Recorded by  [CW] Samina Nash PTA [CW] Samina SALINAS  EAN Nash    Positioning and Restraints    Pre-Treatment Position  in bed  -CW in bed  -CW    Post Treatment Position  chair  -CW bed  -CW    In Bed   side lying left;call light within reach;encouraged to call for assist;exit alarm on;pillow between legs  -CW    In Chair  reclined;call light within reach;encouraged to call for assist;notified nsg;pillow between legs  -CW     Recorded by  [CW] Samina Nash PTA [CW] Samina Nash PTA      02/05/18 1333 02/05/18 1105 02/04/18 1500    Rehab Assessment/Intervention    Discipline occupational therapy assistant  -TS physical therapy assistant  -CW occupational therapy assistant  -CJ    Document Type therapy note (daily note)  -TS therapy note (daily note)  -CW therapy note (daily note)  -CJ    Subjective Information agree to therapy;complains of;pain  -TS agree to therapy;complains of;pain;fatigue  -CW     Patient Effort, Rehab Treatment good  -TS adequate  -CW     Treatment Not Performed   patient/family declined treatment  -CJ    Treatment Not Performed, Comment   --   Pt. refused 2x, states that her son will be here shortly!  -CJ    Precautions/Limitations fall precautions;hip precautions- left  -TS fall precautions;hip precautions- left  -CW     Specific Treatment Considerations  Requires max encouragement to participate  -CW     Recorded by [TS] PRASANTH Valdez/RITA [CW] Samina Nash PTA [CJ] PRASANTH Sanchez/L    Pain Assessment    Pain Assessment 0-10  -TS Tello-Baker FACES  -CW     Tello-Baker FACES Pain Rating  10  -CW     Pain Score 4  -TS      Pain Type Acute pain;Surgical pain  -TS Acute pain  -CW     Pain Location Hip  -TS Hip  -CW     Pain Orientation Right;Left  -TS Left  -CW     Pain Descriptors Aching  -TS      Pain Frequency Intermittent  -TS Intermittent   0/10 before movement  -CW     Pain Intervention(s) Repositioned  -TS Repositioned;Medication (See MAR)  -CW     Response to Interventions pt states that her R hip has  more pain than L hip  -TS tolerated  -CW     Recorded by [TS] PRASANTH Valdez/RITA [CW] Samina Nash PTA     Bed Mobility, Assessment/Treatment    Bed Mobility, Assistive Device bed rails;draw sheet  -TS draw sheet  -CW     Bed Mobility, Roll Left, [place] minimum assist (75% patient effort);2 person assist required  -TS      Bed Mob, Supine to Sit, [place]  maximum assist (25% patient effort);2 person assist required;verbal cues required  -CW     Bed Mob, Sit to Supine, [place]  maximum assist (25% patient effort);2 person assist required;verbal cues required  -CW     Bed Mobility, Safety Issues  cognitive deficits limit understanding;decreased use of legs for bridging/pushing  -CW     Bed Mobility, Impairments  pain;strength decreased  -CW     Recorded by [TS] PRASANTH Valdez/RITA [CW] Samina Nash PTA     Transfer Assessment/Treatment    Transfers, Sit-Stand [place]  moderate assist (50% patient effort);maximum assist (25% patient effort);2 person assist required;verbal cues required  -CW     Transfers, Stand-Sit [place]  moderate assist (50% patient effort);maximum assist (25% patient effort);2 person assist required;verbal cues required  -CW     Transfers, Sit-Stand-Sit, Assist Device  rolling walker  -CW     Transfer, Safety Issues  loses balance backward  -CW     Transfer, Impairments  strength decreased;impaired balance;pain  -CW     Transfer, Comment  Stood while nsg checked skin and changed brief.  Leans heavily posterior  -CW     Recorded by  [CW] Samina Nash PTA     Lower Body Dressing Assessment/Training    LB Dressing Assess/Train, Clothing Type doffing:   brief  -TS      LB Dressing Assess/Train, Position supine  -TS      LB Dressing Assess/Train, [place] maximum assist (25% patient effort)  -TS      LB Dressing Assess/Train, Impairments pain;ROM decreased  -TS      Recorded by [TS] PRASANTH Valdez/L      Toileting  Assessment/Training    Toileting Assess/Train, Position supine   fowlers  -      Toileting Assess/Train, Indep[place] Level supervision required;maximum assist (25% patient effort)  -      Toileting Assess/Train, Comment S for hygiene to jacey area, max A for hygiene to buttocks  -TS      Recorded by [TS] PRASANTH Valdez/L      Motor Skills/Interventions    Additional Documentation  Balance Skills Training (Group)  -CW     Recorded by  [CW] Samina Nash PTA     Balance Skills Training    Sitting-Level of Assistance  Close supervision;Contact guard  -CW     Sitting-Balance Support  Feet supported  -CW     Standing-Level of Assistance  Maximum assistance;x2  -CW     Static Standing Balance Support  assistive device  -CW     Standing Balance # of Minutes  --   leans heavily posterior  -CW     Recorded by  [CW] Samina Nash PTA     Therapy Exercises    Bilateral Lower Extremities  AROM:;5 reps;sitting;ankle pumps/circles;hip flexion;LAQ  -CW     Recorded by  [CW] Samina Nash PTA     Positioning and Restraints    Pre-Treatment Position in bed  -TS in bed  -CW     Post Treatment Position bed  -TS bed  -CW     In Bed side lying left;call light within reach;encouraged to call for assist;exit alarm on;side rails up x2;pillow between legs  -TS supine;call light within reach;encouraged to call for assist;ABD pillow;heels elevated  -CW     Recorded by [TS] PRASANTH Valdez/RITA [CW] Samina Nash PTA       02/04/18 1342 02/04/18 0958 02/03/18 1130    Rehab Assessment/Intervention    Discipline physical therapy assistant  -NW physical therapy assistant  -NW     Document Type therapy note (daily note)  -NW therapy note (daily note)  -NW     Subjective Information complains of;pain;fatigue  -NW agree to therapy;complains of;pain  -NW     Patient Effort, Rehab Treatment adequate  -NW adequate  -NW     Precautions/Limitations fall precautions;hip precautions- left  -NW fall precautions;hip  precautions- left  -NW     Specific Treatment Considerations encourged pt to work w/PT, but wanted to be left alone  -NW pt very anxious needs encouragement to work w/ PT, was more worried about her son coming and she wanted to see him,  -NW     Recorded by [NW] Jaclyn Castano PTA [NW] Jaclyn Castano PTA     Pain Assessment    Pain Assessment Tello-Baker FACES  -NW Tello-Baker FACES  -NW     Tello-Baker FACES Pain Rating 10  -NW 8  -NW     Pain Location Hip  -NW Hip  -NW     Pain Orientation Left  -NW Left  -NW     Pain Frequency Constant/continuous  -NW Constant/continuous  -NW     Pain Intervention(s) Medication (See MAR);Repositioned  -NW Repositioned;Medication (See MAR)  -NW     Recorded by [NW] Jaclyn Castano PTA [NW] Jaclyn Castano PTA     Swallow Assessment/Intervention    Additional Documentation   Dysphagia/Swallow Intervention (Group)  -MB    Recorded by   [MB] Vamsi Jacinto CCC-SLP    Dysphagia/Swallow Intervention    Dysphagia/Swallow Therapeutic Feed   Pt will tolerate trials of current diet with no overt s/s of aspiration.  -MB    Recorded by   [MB] Vamsi Jacinto CCC-SLP    Bed Mobility, Assessment/Treatment    Bed Mobility, Assistive Device draw sheet  -NW draw sheet  -NW     Bed Mob, Supine to Sit, [place] verbal cues required;maximum assist (25% patient effort);2 person assist required  -NW verbal cues required;moderate assist (50% patient effort);maximum assist (25% patient effort);2 person assist required  -NW     Bed Mob, Sit to Supine, [place] verbal cues required;maximum assist (25% patient effort);2 person assist required  -NW verbal cues required;maximum assist (25% patient effort);2 person assist required  -NW     Bed Mobility, Safety Issues cognitive deficits limit understanding  -NW cognitive deficits limit understanding;decreased use of arms for pushing/pulling;decreased use of legs for bridging/pushing  -NW     Bed Mobility, Impairments pain;strength decreased   "-NW strength decreased;pain  -NW     Recorded by [NW] Jaclyn Castano PTA [NW] Jaclyn Castano PTA     Transfer Assessment/Treatment    Transfers, Sit-Stand [place] verbal cues required;moderate assist (50% patient effort);maximum assist (25% patient effort);2 person assist required  -NW verbal cues required;moderate assist (50% patient effort);2 person assist required  -NW     Transfers, Stand-Sit [place] verbal cues required;moderate assist (50% patient effort)  -NW verbal cues required;moderate assist (50% patient effort)  -NW     Transfers, Sit-Stand-Sit, Assist Device rolling walker  -NW rolling walker  -NW     Transfer, Safety Issues  step length decreased;weight-shifting ability decreased  -NW     Transfer, Impairments  pain;strength decreased  -NW     Transfer, Comment attempt to take sidesteps this pm but pt unable \"please just lay me down\" encouraged pt to keep trying but adamently refused so got pt laid bk down  -NW      Recorded by [NW] Jaclyn Castano PTA [NW] Jaclyn Castano PTA     Gait Assessment/Treatment    Gait, [place] Level  moderate assist (50% patient effort);2 person assist required  -NW     Gait, Assistive Device  rolling walker  -NW     Gait, Distance (Feet)  --   few sidesteps towards HOB  -NW     Gait, Gait Deviations  step length decreased;stride length decreased  -NW     Gait, Safety Issues  step length decreased;balance decreased during turns  -NW     Gait, Impairments  pain;strength decreased  -NW     Gait, Comment  pt very anxious needs lots of cues for safety  -NW     Recorded by  [NW] Jaclyn Castano PTA     Therapy Exercises    Left Lower Extremity  --   reviewed ex's but pt refused to follow through w/ them  -NW     Recorded by  [NW] Jaclyn Castano PTA     Positioning and Restraints    Pre-Treatment Position in bed  -NW in bed  -NW     Post Treatment Position bed  -NW bed  -NW     In Bed fowlers;call light within reach;encouraged to call for assist;notified " nsg  -NW fowlers;call light within reach;encouraged to call for assist;exit alarm on;notified nsg  -NW     Recorded by [NW] Jaclyn Castano, PTA [NW] Jaclyn Castano, PTA       User Key  (r) = Recorded By, (t) = Taken By, (c) = Cosigned By    Initials Name Effective Dates    MB Vamsi Jacinto, [facility]-SLP 08/02/16 -     CJ Cristian Carias, RADER/L 08/02/16 -     TS Nallely Tripp, RADER/L 08/02/16 -     CW Samina Nash, PTA 06/22/15 -     NW Jaclyn Castano, PTA 08/02/16 -                 IP PT Goals       02/03/18 1229          Bed Mobility PT LTG    Bed Mobility PT LTG, Date Established 02/03/18  -TB      Bed Mobility PT LTG, Time to Achieve by discharge  -TB      Bed Mobility PT LTG, Activity Type all bed mobility  -TB      Bed Mobility PT LTG, [place] Level supervision required  -TB      Transfer Training PT LTG    Transfer Training PT LTG, Date Established 02/03/18  -TB      Transfer Training PT LTG, Time to Achieve by discharge  -TB      Transfer Training PT LTG, Activity Type all transfers  -TB      Transfer Training PT LTG, [place] Level contact guard assist  -TB      Transfer Training PT LTG, Assist Device walker, rolling  -TB      Gait Training PT LTG    Gait Training Goal PT LTG, Date Established 02/03/18  -TB      Gait Training Goal PT LTG, Time to Achieve by discharge  -TB      Gait Training Goal PT LTG, [place] Level contact guard assist  -TB      Gait Training Goal PT LTG, Assist Device walker, rolling  -TB      Gait Training Goal PT LTG, Distance to Achieve 20  -TB      Gait Training Goal PT LTG, Additional Goal able to stand upright without losing balance posterior with min limping  -TB      Range of Motion PT LTG    Range of Motion Goal PT LTG, Date Established 02/03/18  -TB      Range of Motion Goal PT LTG, Time to Achieve by discharge  -TB      Range of Motion Goal PT LTG, PROM Measure benja ankle DF >0 deg  -TB        User Key  (r) = Recorded By, (t) = Taken By, (c) =  Cosigned By    Initials Name Provider Type    TB Josias White, ZIYAD Physical Therapist          Physical Therapy Education     Title: PT OT SLP Therapies (Active)     Topic: Physical Therapy (Active)     Point: Mobility training (Active)    Learning Progress Summary    Learner Readiness Method Response Comment Documented by Status   Patient Acceptance E,D NR Bed mobility, transfers, benefit of activity, plan of care  02/06/18 1019 Active    Acceptance E,D NR Bed mobility, transfers, plan of care, benefit of activity CW 02/05/18 1143 Active    Acceptance E VU,NR benefits of activity NW 02/04/18 1037 Done    Acceptance E NR   02/03/18 1227 Active   Family Acceptance E NR   02/03/18 1227 Active               Point: Home exercise program (Active)    Learning Progress Summary    Learner Readiness Method Response Comment Documented by Status   Patient Acceptance E,D NR Bed mobility, transfers, benefit of activity, plan of care  02/06/18 1019 Active    Acceptance E,D NR Bed mobility, transfers, plan of care, benefit of activity CW 02/05/18 1143 Active    Acceptance E NR   02/03/18 1227 Active   Family Acceptance E NR   02/03/18 1227 Active               Point: Body mechanics (Active)    Learning Progress Summary    Learner Readiness Method Response Comment Documented by Status   Patient Acceptance E,D NR Bed mobility, transfers, benefit of activity, plan of care  02/06/18 1019 Active    Acceptance E,D NR Bed mobility, transfers, plan of care, benefit of activity  02/05/18 1143 Active    Acceptance E NR   02/03/18 1227 Active   Family Acceptance E NR   02/03/18 1227 Active               Point: Precautions (Active)    Learning Progress Summary    Learner Readiness Method Response Comment Documented by Status   Patient Acceptance E,D NR Bed mobility, transfers, benefit of activity, plan of care  02/06/18 1019 Active    Acceptance E,D NR Bed mobility, transfers, plan of care, benefit of activity   02/05/18 1143 Active    Acceptance E NR  TB 02/03/18 1227 Active   Family Acceptance E NR   02/03/18 1227 Active                      User Key     Initials Effective Dates Name Provider Type Discipline    TB 08/02/16 -  Josias White, PT Physical Therapist PT    CW 06/22/15 -  Samina Nash, PTA Physical Therapy Assistant PT    NW 08/02/16 -  Jaclyn Castano, EAN Physical Therapy Assistant PT                    PT Recommendation and Plan  Anticipated Equipment Needs At Discharge: gait belt, two wheeled walker  Anticipated Discharge Disposition: transitional care  Planned Therapy Interventions: strengthening, ROM (Range of Motion), balance training, bed mobility training, gait training, transfer training  PT Frequency: 2 times/day  Plan of Care Review  Plan Of Care Reviewed With: patient  Progress: progress towards functional goals is fair  Outcome Summary/Follow up Plan: Pt requires max assist of 2 for bed mobility, transfers and gait. Pt has a hard posterior lean when standing and slides her feet forward while leaning back when trying to ambulate.  Pt will need continued therapy from SNF at discharge.          Outcome Measures       02/06/18 1000 02/05/18 1400 02/05/18 1100    How much help from another person do you currently need...    Turning from your back to your side while in flat bed without using bedrails? 2  -CW  2  -CW    Moving from lying on back to sitting on the side of a flat bed without bedrails? 2  -CW  2  -CW    Moving to and from a bed to a chair (including a wheelchair)? 2  -CW  1  -CW    Standing up from a chair using your arms (e.g., wheelchair, bedside chair)? 2  -CW  2  -CW    Climbing 3-5 steps with a railing? 1  -CW  1  -CW    To walk in hospital room? 2  -CW  1  -CW    AM-PAC 6 Clicks Score 11  -CW  9  -CW    How much help from another is currently needed...    Putting on and taking off regular lower body clothing?  2  -TS     Bathing (including washing, rinsing, and drying)  2   -TS     Toileting (which includes using toilet bed pan or urinal)  2  -TS     Putting on and taking off regular upper body clothing  3  -TS     Taking care of personal grooming (such as brushing teeth)  3  -TS     Eating meals  4  -TS     Score  16  -TS     Functional Assessment    Outcome Measure Options AM-PAC 6 Clicks Basic Mobility (PT)  -CW AM-PAC 6 Clicks Daily Activity (OT)  -TS AM-PAC 6 Clicks Basic Mobility (PT)  -CW      02/03/18 1400 02/03/18 1200       How much help from another person do you currently need...    Turning from your back to your side while in flat bed without using bedrails?  2  -TB     Moving from lying on back to sitting on the side of a flat bed without bedrails?  2  -TB     Moving to and from a bed to a chair (including a wheelchair)?  1  -TB     Standing up from a chair using your arms (e.g., wheelchair, bedside chair)?  2  -TB     Climbing 3-5 steps with a railing?  1  -TB     To walk in hospital room?  1  -TB     AM-PAC 6 Clicks Score  9  -TB     How much help from another is currently needed...    Putting on and taking off regular lower body clothing? 2  -AC (r) MK (t) AC (c)      Bathing (including washing, rinsing, and drying) 2  -AC (r) MK (t) AC (c)      Toileting (which includes using toilet bed pan or urinal) 2  -AC (r) MK (t) AC (c)      Putting on and taking off regular upper body clothing 3  -AC (r) MK (t) AC (c)      Taking care of personal grooming (such as brushing teeth) 3  -AC (r) MK (t) AC (c)      Eating meals 3  -AC (r) MK (t) AC (c)      Score 15  -AC (r) MK (t)      Functional Assessment    Outcome Measure Options AM-PAC 6 Clicks Daily Activity (OT)  -AC (r) MK (t) AC (c) AM-PAC 6 Clicks Basic Mobility (PT)  -TB       User Key  (r) = Recorded By, (t) = Taken By, (c) = Cosigned By    Initials Name Provider Type    TB Josias White, PT Physical Therapist    AC Дмитрий Jacobsen, OTR/L Occupational Therapist    TS PRASANTH Valdez/L Occupational Therapy  Assistant    CW Samina Nash PTA Physical Therapy Assistant    DIONNE Cavazos, OT Student OT Student           Time Calculation:         PT Charges       02/06/18 1026          Time Calculation    Start Time 0900  -CW      Stop Time 0940  -CW      Time Calculation (min) 40 min  -CW      PT Received On 02/06/18  -CW      PT Goal Re-Cert Due Date 02/13/18  -CW      Time Calculation- PT    Total Timed Code Minutes- PT 40 minute(s)  -CW        User Key  (r) = Recorded By, (t) = Taken By, (c) = Cosigned By    Initials Name Provider Type    MÓNICA Nash PTA Physical Therapy Assistant          Therapy Charges for Today     Code Description Service Date Service Provider Modifiers Qty    35484795203 HC PT THERAPEUTIC ACT EA 15 MIN 2/5/2018 Samina Nash PTA GP, KX 1    29926239709 HC PT THER PROC EA 15 MIN 2/5/2018 Samina Nash PTA GP, KX 1    56132129579 HC PT THER PROC EA 15 MIN 2/5/2018 Samina Nash PTA GP, KX 1    85612345010 HC PT THERAPEUTIC ACT EA 15 MIN 2/6/2018 Samina Nash, PTA GP, KX 2    [id number] HC PT THER PROC EA 15 MIN 2/6/2018 Samina Nash, PTA GP, KX 1          PT G-Codes  Outcome Measure Options: AM-PAC 6 Clicks Basic Mobility (PT)  Score: 9  Functional Limitation: Mobility: Walking and moving around  Mobility: Walking and Moving Around Current Status (): At least 60 percent but less than 80 percent impaired, limited or restricted  Mobility: Walking and Moving Around Goal Status (): At least 40 percent but less than 60 percent impaired, limited or restricted    Samina Nash PTA  2/6/2018

## 2018-02-06 NOTE — DISCHARGE SUMMARY
[facility]  Garry Roach M.D.  CEM Roach M.D.  AMARIS Emmanuel    Internal Medicine Discharge Summary    Patient ID: Andres Walter  MRN: [id number]     Acct:  [id number]       Patient's PCP: No Known Provider    Admit Date: 2/1/2018     Discharge Date:       Admitting Physician: Som Roach MD    Discharge Physician: AMARIS Rodriguez     Active Discharge Diagnoses:  1.  Impacted left subcapital femur fracture s/p fall  2. UTI - e. Coli - present on admission  3. HTN  4. Encephalopathy - improved  5. Iron deficiency anemia  6. Hypothyroidism  7. HLD            8.   Hypokalemia        Hospital Problems  Active Problems:    Fall     Past Medical History:   Diagnosis Date   • Arthritis    • Encephalopathy    • Hypertension        The patient was seen and examined on the day of discharge and this discharge summary is in conjunction with any daily progress note from day of discharge.    Code Status:  Full Code    Hospital Course: The patient has been in declining health recently. Our office has had multiple phone calls from neighbors, family members and  stating that patient's self care abilities have declined and that she has become increasingly confused and paranoid. The patient had refused medical treatment and had declined hospital admission. She was found yesterday at home after having suffered an unwitnessed fall at an undetermined time. The circumstances surrounding the fall are unknown. The patient was noted to have decreased range of motion and an obvious deformity of the left hip. She was advised to present to [facility] for evaluation and treatment so that psychiatric evaluation could be completed upon repair of the fracture. The patient, however, presented to [facility]. She was found to have an impacted left subcapital femoral neck fracture as well as a urinary tract infection. She was been admitted to our service for further  evaluation and treatment. The patient underwent surgical repair of the hip fracture per Dr. Barger per left hip hemiarthroplasty on 2/2/18. The patient suffered no immediate intra-op or post op complications. The patient's mental status has improved slightly, although she does continue to have some periods or confusion and paranoia. She remains profoundly weak and has been slow to progress with therapy due to pain. She is now felt stable for discharge to SNF to complete rehabilitation and po antibiotic therapy.    Consults:  Dr. Barger (orthopedics)    Disposition: SNF    Discharged Condition: Stable    Follow Up: No Known Provider  [facility] [id number]  [phone]            Diet: Diet Regular; Thin    Discharge Medications:    Andres Walter   Home Medication Instructions NEGRITA:[id number]    Printed on:02/06/18 1042   Medication Information                      atorvastatin (LIPITOR) 20 MG tablet  Take 20 mg by mouth Daily.             bisoprolol-hydrochlorothiazide (ZIAC) 5-6.25 MG per tablet  Take 1 tablet by mouth Daily.             brimonidine-timolol (COMBIGAN) 0.2-0.5 % ophthalmic solution  Administer 1 drop to both eyes Every 12 (Twelve) Hours.             docusate sodium 100 MG capsule  Take 100 mg by mouth 2 (Two) Times a Day.             dorzolamide (TRUSOPT) 2 % ophthalmic solution  Administer 1 drop to both eyes 3 (Three) Times a Day.             ferrous sulfate 325 (65 FE) MG tablet  Take 1 tablet by mouth 2 (Two) Times a Day With Meals.             HYDROcodone-acetaminophen (NORCO)  MG per tablet  Take 1 tablet by mouth Every 4 (Four) Hours As Needed for Severe Pain  for up to 6 days.             levothyroxine (SYNTHROID, LEVOTHROID) 100 MCG tablet  Take 100 mcg by mouth Daily.             ondansetron (ZOFRAN) 4 MG tablet  Take 1 tablet by mouth Every 6 (Six) Hours As Needed for Nausea.             tobramycin 0.3 % solution ophthalmic solution  Administer 1 drop into the left  eye 3 (Three) Times a Day.             Keflex 250 mg po 4 times daily x 5 days.     Time Spent on discharge is  32 minutes in patient examination, evaluation, patient/family counseling as well as medication reconciliation, prescriptions for required medications, discharge plan and follow up.     Electronically signed by AMARIS Rodriguez on 2/6/2018 at 10:44 AM   I have discussed the care of Andrse Walter, including pertinent history and exam findings, with the nurse practitioner.    I have seen and examined the patient and the key elements of all parts of the encounter have been performed by me.  I agree with the assessment, plan and orders as documented by Melisa GLEZ, after I modified the exam findings and the plan of treatments and the final version is my approved version of the assessment.        Electronically signed by Som Roach MD on 2/6/2018 at 4:04 PM

## 2018-02-07 VITALS
TEMPERATURE: 98.1 F | HEART RATE: 79 BPM | OXYGEN SATURATION: 98 % | DIASTOLIC BLOOD PRESSURE: 45 MMHG | RESPIRATION RATE: 18 BRPM | WEIGHT: 100 LBS | SYSTOLIC BLOOD PRESSURE: 102 MMHG | BODY MASS INDEX: 18.4 KG/M2 | HEIGHT: 62 IN

## 2018-02-07 LAB
ANION GAP SERPL CALCULATED.3IONS-SCNC: 4 MMOL/L (ref 4–13)
BUN BLD-MCNC: 20 MG/DL (ref 5–21)
BUN/CREAT SERPL: 21.1 (ref 7–25)
CALCIUM SPEC-SCNC: 8.2 MG/DL (ref 8.4–10.4)
CHLORIDE SERPL-SCNC: 97 MMOL/L (ref 98–110)
CO2 SERPL-SCNC: 35 MMOL/L (ref 24–31)
CREAT BLD-MCNC: 0.95 MG/DL (ref 0.5–1.4)
DEPRECATED RDW RBC AUTO: 49 FL (ref 40–54)
ERYTHROCYTE [DISTWIDTH] IN BLOOD BY AUTOMATED COUNT: 14 % (ref 12–15)
GFR SERPL CREATININE-BSD FRML MDRD: 56 ML/MIN/1.73
GLUCOSE BLD-MCNC: 99 MG/DL (ref 70–100)
HCT VFR BLD AUTO: 27.4 % (ref 37–47)
HGB BLD-MCNC: 8.9 G/DL (ref 12–16)
MCH RBC QN AUTO: 31.1 PG (ref 28–32)
MCHC RBC AUTO-ENTMCNC: 32.5 G/DL (ref 33–36)
MCV RBC AUTO: 95.8 FL (ref 82–98)
PLATELET # BLD AUTO: 357 10*3/MM3 (ref 130–400)
PMV BLD AUTO: 9.4 FL (ref 6–12)
POTASSIUM BLD-SCNC: 3.7 MMOL/L (ref 3.5–5.3)
RBC # BLD AUTO: 2.86 10*6/MM3 (ref 4.2–5.4)
SODIUM BLD-SCNC: 136 MMOL/L (ref 135–145)
WBC NRBC COR # BLD: 8.28 10*3/MM3 (ref 4.8–10.8)

## 2018-02-07 PROCEDURE — 92507 TX SP LANG VOICE COMM INDIV: CPT

## 2018-02-07 PROCEDURE — 80048 BASIC METABOLIC PNL TOTAL CA: CPT | Performed by: INTERNAL MEDICINE

## 2018-02-07 PROCEDURE — 97530 THERAPEUTIC ACTIVITIES: CPT

## 2018-02-07 PROCEDURE — 97110 THERAPEUTIC EXERCISES: CPT

## 2018-02-07 PROCEDURE — 25010000002 ENOXAPARIN PER 10 MG: Performed by: ORTHOPAEDIC SURGERY

## 2018-02-07 PROCEDURE — 85027 COMPLETE CBC AUTOMATED: CPT | Performed by: INTERNAL MEDICINE

## 2018-02-07 PROCEDURE — 25010000002 FUROSEMIDE PER 20 MG: Performed by: INTERNAL MEDICINE

## 2018-02-07 RX ADMIN — ENOXAPARIN SODIUM 30 MG: 30 INJECTION SUBCUTANEOUS at 09:15

## 2018-02-07 RX ADMIN — HYDROCODONE BITARTRATE AND ACETAMINOPHEN 1 TABLET: 5; 325 TABLET ORAL at 09:20

## 2018-02-07 RX ADMIN — LACTULOSE 20 G: 20 SOLUTION ORAL at 09:15

## 2018-02-07 RX ADMIN — BISOPROLOL FUMARATE: 5 TABLET ORAL at 09:14

## 2018-02-07 RX ADMIN — TOBRAMYCIN 1 DROP: 3 SOLUTION OPHTHALMIC at 09:15

## 2018-02-07 RX ADMIN — DOCUSATE SODIUM 100 MG: 100 CAPSULE ORAL at 09:14

## 2018-02-07 RX ADMIN — DORZOLAMIDE HYDROCHLORIDE 1 DROP: 20 SOLUTION/ DROPS OPHTHALMIC at 09:15

## 2018-02-07 RX ADMIN — LEVOTHYROXINE SODIUM 100 MCG: 100 TABLET ORAL at 06:15

## 2018-02-07 RX ADMIN — FUROSEMIDE 20 MG: 10 INJECTION, SOLUTION INTRAVENOUS at 09:15

## 2018-02-07 RX ADMIN — FERROUS SULFATE TAB 325 MG (65 MG ELEMENTAL FE) 325 MG: 325 (65 FE) TAB at 09:14

## 2018-02-07 NOTE — THERAPY TREATMENT NOTE
Acute Care - Speech Language Pathology Treatment Note  [facility]     Patient Name: Andres Walter  : 1929  MRN: [id number]  Today's Date: 2018  Referring Physician: DESTINEE Roach      Admit Date: 2018    Visit Dx:      ICD-10-CM ICD-9-CM   1. Fall, initial encounter W19.XXXA E888.9   2. Closed fracture of left hip, initial encounter S72.002A 820.8   3. Pneumonia of both lungs due to infectious organism, unspecified part of lung J18.9 483.8   4. Acute UTI N39.0 599.0   5. Abnormality of gait and mobility R26.9 781.2   6. Dysphagia, unspecified type R13.10 787.20   7. Impaired mobility and ADLs Z74.09 799.89   8. Impaired cognition R41.89 294.9     Patient Active Problem List   Diagnosis   • Fall              Adult Rehabilitation Note       18 0920 18 0910 18 1405    Rehab Assessment/Intervention    Discipline physical therapy assistant  - speech language pathologist  -MB physical therapy assistant  -    Document Type  therapy note (daily note)  -MB therapy note (daily note)  -    Subjective Information   agree to therapy;complains of;pain;fatigue;weakness  -CW    Patient Effort, Rehab Treatment   fair  -CW    Precautions/Limitations   fall precautions;hip precautions- left  -CW    Recorded by [] Prachi Franco PTA [MB] Vamsi Jacinto CCC-SLP [CW] Samina Nash PTA    Pain Assessment    Pain Assessment   Tello-Cabral FACES  -CW    Tello-Baker FACES Pain Rating   6  -CW    Pain Type   Acute pain;Surgical pain  -CW    Pain Location   Hip  -CW    Pain Orientation   Left  -CW    Pain Frequency   Intermittent  -CW    Recorded by   [CW] Samina Nash PTA    Improve memory skills    Memory Skills Progress  20%;with inconsistent cues  -MB     Comments: Improve memory skills  Pt unable to recall information provided by SLP yesterday. Nurse reported pt will be discharging to nursing facility today by ambulance.   -MB     Recorded by  [MB] Vamsi Jacinto CCC-SLP      Therapy Exercises    Right Lower Extremity   AROM:;AAROM:;15 reps;supine  -CW    Left Lower Extremity   AAROM:;15 reps;supine  -CW    Recorded by   [CW] Samina Nash PTA    Positioning and Restraints    Pre-Treatment Position   in bed  -CW    Post Treatment Position   bed  -CW    In Bed   fowlers;call light within reach;encouraged to call for assist;exit alarm on;pillow between legs  -CW    Recorded by   [CW] Samina Nash PTA      02/06/18 1140 02/06/18 1034 02/06/18 0939    Rehab Assessment/Intervention    Discipline physical therapy assistant  -CW occupational therapy assistant  -TS speech language pathologist  -MB    Document Type therapy note (daily note)  -CW --  -TS therapy note (daily note)  -MB    Subjective Information agree to therapy;complains of;pain  -CW  agree to therapy  -MB    Patient Effort, Rehab Treatment fair  -CW  adequate  -MB    Precautions/Limitations fall precautions;hip precautions- left  -CW      Recorded by [CW] Samina Nash PTA [TS] PRASANTH Valdez/RITA [MB] Vamsi Jacinto CCC-SLP    Pain Assessment    Pain Assessment Tello-Cabral FACES  -CW      Tello-Baker FACES Pain Rating 4  -CW      Pain Type Acute pain;Surgical pain  -CW      Pain Location Hip  -CW      Pain Orientation Left  -CW      Pain Frequency Intermittent  -CW      Pain Intervention(s) Repositioned  -CW      Response to Interventions tolerated  -CW      Recorded by [CW] Samina Nash PTA      Improve memory skills    Memory Skills Progress   40%;without cues  -MB    Comments: Improve memory skills   Pt was able to tell SLP some of the things that were on her breakfast tray. She repeated herself 3x about getting one Honey Nut Cheerio and one regular Cheerio package. She appeared to have no memory that she had already told the therapist that information each time.   -MB    Recorded by   [MB] Vamsi Jacinto CCC-SLP    Dysphagia/Swallow Intervention    Dysphagia/Swallow Therapeutic Feed    Pt completed trials of thin water via cup with no overt s/s of aspiration. Pt had complaints about food, so SLP had an extensive conversation about pt preferences. She appeared generally displeased, stating that everything she has received so far has not been good. SLP notified CTY person for 3A of pt preferences that would be listed in the comments section of her diet order.  -MB    Recorded by   [MB] Vamsi Jacinto CCC-SLP    Bed Mobility, Assessment/Treatment    Bed Mob, Sit to Supine, [place] maximum assist (25% patient effort);2 person assist required;verbal cues required  -CW      Bed Mobility, Safety Issues cognitive deficits limit understanding  -CW      Bed Mobility, Impairments strength decreased;pain  -CW      Recorded by [CW] Samina Nash PTA      Transfer Assessment/Treatment    Transfers, Chair-Bed [place] maximum assist (25% patient effort);2 person assist required;verbal cues required;hand held assist  -CW      Transfers, Sit-Stand [place] maximum assist (25% patient effort);2 person assist required;hand held assist  -CW      Transfers, Stand-Sit [place] maximum assist (25% patient effort);2 person assist required  -CW      Transfer, Safety Issues loses balance backward  -CW      Transfer, Impairments strength decreased;postural control impaired;pain  -CW      Transfer, Comment Stand pivot chair to bed - hard posterior lean  -CW      Recorded by [CW] Samina Nash PTA      Positioning and Restraints    Pre-Treatment Position sitting in chair/recliner  -CW      Post Treatment Position bed  -CW      In Bed notified nsg;supine;call light within reach;exit alarm on;pillow between legs  -CW      Recorded by [CW] Samina Nash PTA        02/06/18 0900 02/05/18 1505 02/05/18 1333    Rehab Assessment/Intervention    Discipline physical therapy assistant  -CW physical therapy assistant  -CW occupational therapy assistant  -TS    Document Type therapy note (daily  "note)  -CW therapy note (daily note)  -CW therapy note (daily note)  -TS    Subjective Information agree to therapy;complains of;pain;fatigue;swelling   B feet swollen  -CW agree to therapy;complains of;weakness;fatigue;pain  -CW agree to therapy;complains of;pain  -TS    Patient Effort, Rehab Treatment fair  -CW fair  -CW good  -TS    Precautions/Limitations fall precautions;hip precautions- left  -CW fall precautions;hip precautions- left  -CW fall precautions;hip precautions- left  -TS    Recorded by [CW] Samina Nash PTA [CW] Samina Nash PTA [TS] Nallely Tripp, RADER/L    Pain Assessment    Pain Assessment Tello-Cabral FACES  -CW Tello-Baker FACES  -CW 0-10  -TS    Tello-Cabral FACES Pain Rating 4  -CW 4  -CW     Pain Score   4  -TS    Pain Type Acute pain;Surgical pain  -CW Acute pain;Surgical pain  -CW Acute pain;Surgical pain  -TS    Pain Location Hip  -CW Hip  -CW Hip  -TS    Pain Orientation Left  -CW Right;Left  -CW Right;Left  -TS    Pain Descriptors Aching  -CW Aching  -CW Aching  -TS    Pain Frequency Intermittent  -CW Intermittent  -CW Intermittent  -TS    Pain Intervention(s) Repositioned  -CW Repositioned  -CW Repositioned  -TS    Response to Interventions tolerated  -CW tolerated  -CW pt states that her R hip has more pain than L hip  -TS    Multiple Pain Sites Yes  -CW      Recorded by [CW] Samina Nash PTA [CW] Samina Nash PTA [TS] Nallely Tripp, RADER/L    Pain 2    Tello-Baker FACES Pain Rating 2 6  -CW      Pain Type 2 Acute pain  -CW      Pain Location 2 Foot  -CW      Pain Orientation 2 Right;Left  -CW      Pain Descriptors 2 Tender   \"hurts\"  -CW      Pain Frequency 2 Constant/continuous  -CW      Pain Intervention(s) 2 Repositioned  -CW      Response to Interventions 2 tolerated  -CW      Recorded by [CW] Samina Nash PTA      Cognitive Assessment/Intervention    Current Cognitive/Communication Assessment impaired  -CW      Recorded by [MÓNICA] Samina" RITA Nash PTA      Bed Mobility, Assessment/Treatment    Bed Mobility, Assistive Device head of bed elevated;draw sheet  -CW  bed rails;draw sheet  -TS    Bed Mobility, Roll Left, [place]   minimum assist (75% patient effort);2 person assist required  -TS    Bed Mob, Supine to Sit, [place] maximum assist (25% patient effort);2 person assist required  -CW      Bed Mob, Sit to Supine, [place] --   in chair  -CW      Bed Mobility, Safety Issues cognitive deficits limit understanding;decreased use of legs for bridging/pushing  -CW      Bed Mobility, Impairments strength decreased;impaired balance;pain  -CW      Recorded by [CW] Samina Nash PTA  [TS] FLOR ValdezA/L    Transfer Assessment/Treatment    Transfers, Bed-Chair [place] maximum assist (25% patient effort);2 person assist required;verbal cues required  -CW      Transfers, Bed-Chair-Bed, Assist Device rolling walker  -CW      Transfers, Sit-Stand [place] maximum assist (25% patient effort);2 person assist required;verbal cues required  -CW      Transfers, Stand-Sit [place] maximum assist (25% patient effort);2 person assist required;verbal cues required  -CW      Transfers, Sit-Stand-Sit, Assist Device rolling walker  -CW      Transfer, Safety Issues weight-shifting ability decreased;loses balance backward  -CW      Transfer, Impairments impaired balance;decreased flexibility;ROM decreased;pain  -CW      Transfer, Comment Hard posterior lean with feet sliding forward  -CW      Recorded by [CW] Samina Nash PTA      Gait Assessment/Treatment    Gait, [place] Level maximum assist (25% patient effort);2 person assist required;verbal cues required  -CW      Gait, Assistive Device rolling walker  -CW      Gait, Distance (Feet) 2  -CW      Gait, Safety Issues loses balance backward  -CW      Gait, Impairments decreased flexibility;ROM decreased;impaired balance;pain  -CW      Gait, Comment Hard posterior  lean with feet sliding forward.  -CW      Recorded by [CW] Samina Nash PTA      Lower Body Dressing Assessment/Training    LB Dressing Assess/Train, Clothing Type   doffing:   brief  -TS    LB Dressing Assess/Train, Position   supine  -TS    LB Dressing Assess/Train, [place]   maximum assist (25% patient effort)  -TS    LB Dressing Assess/Train, Impairments   pain;ROM decreased  -TS    Recorded by   [TS] LÓPEZ Valdez    Toileting Assessment/Training    Toileting Assess/Train, Position   supine   fowlers  -TS    Toileting Assess/Train, Indepen Level   supervision required;maximum assist (25% patient effort)  -TS    Toileting Assess/Train, Comment   S for hygiene to jacey area, max A for hygiene to buttocks  -TS    Recorded by   [TS] LÓPEZ Valdez    Therapy Exercises    Right Lower Extremity AAROM:;AROM:;15 reps   reclined in chair  -CW AAROM:;AROM:;10 reps;supine  -CW     Left Lower Extremity PROM:;AAROM:;15 reps   reclined in chair  -CW PROM:;AAROM:;10 reps;supine  -CW     Recorded by [CW] Samina Nash PTA [CW] Samina Nash PTA     Positioning and Restraints    Pre-Treatment Position in bed  -CW in bed  -CW in bed  -TS    Post Treatment Position chair  -CW bed  -CW bed  -TS    In Bed  side lying left;call light within reach;encouraged to call for assist;exit alarm on;pillow between legs  -CW side lying left;call light within reach;encouraged to call for assist;exit alarm on;side rails up x2;pillow between legs  -TS    In Chair reclined;call light within reach;encouraged to call for assist;notified nsg;pillow between legs  -CW      Recorded by [CW] Samina Nash PTA [CW] Samina Nash PTA [TS] LÓPEZ Valdez      02/05/18 1105 02/04/18 1500 02/04/18 1342    Rehab Assessment/Intervention    Discipline physical therapy assistant  -CW occupational therapy assistant  -CJ physical therapy assistant  -NW    Document Type therapy note (daily  note)  -CW therapy note (daily note)  -CJ therapy note (daily note)  -NW    Subjective Information agree to therapy;complains of;pain;fatigue  -CW  complains of;pain;fatigue  -NW    Patient Effort, Rehab Treatment adequate  -CW  adequate  -NW    Treatment Not Performed  patient/family declined treatment  -CJ     Treatment Not Performed, Comment  --   Pt. refused 2x, states that her son will be here shortly!  -CJ     Precautions/Limitations fall precautions;hip precautions- left  -CW  fall precautions;hip precautions- left  -NW    Specific Treatment Considerations Requires max encouragement to participate  -CW  encourged pt to work w/PT, but wanted to be left alone  -NW    Recorded by [CW] Samina Nash PTA [CJ] PRASANTH Sanchez/RITA [NW] Jaclyn Castano PTA    Pain Assessment    Pain Assessment Tello-Cabral FACES  -CW  Tello-Cabral FACES  -NW    Tello-Baker FACES Pain Rating 10  -CW  10  -NW    Pain Type Acute pain  -CW      Pain Location Hip  -CW  Hip  -NW    Pain Orientation Left  -CW  Left  -NW    Pain Frequency Intermittent   0/10 before movement  -CW  Constant/continuous  -NW    Pain Intervention(s) Repositioned;Medication (See MAR)  -CW  Medication (See MAR);Repositioned  -NW    Response to Interventions tolerated  -CW      Recorded by [CW] Samina Nash PTA  [NW] Jaclyn Castano PTA    Bed Mobility, Assessment/Treatment    Bed Mobility, Assistive Device draw sheet  -CW  draw sheet  -NW    Bed Mob, Supine to Sit, [place] maximum assist (25% patient effort);2 person assist required;verbal cues required  -CW  verbal cues required;maximum assist (25% patient effort);2 person assist required  -NW    Bed Mob, Sit to Supine, [place] maximum assist (25% patient effort);2 person assist required;verbal cues required  -CW  verbal cues required;maximum assist (25% patient effort);2 person assist required  -NW    Bed Mobility, Safety Issues cognitive deficits limit understanding;decreased use of  "legs for bridging/pushing  -CW  cognitive deficits limit understanding  -NW    Bed Mobility, Impairments pain;strength decreased  -CW  pain;strength decreased  -NW    Recorded by [CW] Samina Nash PTA  [NW] Jaclyn Castano PTA    Transfer Assessment/Treatment    Transfers, Sit-Stand [place] moderate assist (50% patient effort);maximum assist (25% patient effort);2 person assist required;verbal cues required  -CW  verbal cues required;moderate assist (50% patient effort);maximum assist (25% patient effort);2 person assist required  -NW    Transfers, Stand-Sit [place] moderate assist (50% patient effort);maximum assist (25% patient effort);2 person assist required;verbal cues required  -CW  verbal cues required;moderate assist (50% patient effort)  -NW    Transfers, Sit-Stand-Sit, Assist Device rolling walker  -CW  rolling walker  -NW    Transfer, Safety Issues loses balance backward  -CW      Transfer, Impairments strength decreased;impaired balance;pain  -CW      Transfer, Comment Stood while nsg checked skin and changed brief.  Leans heavily posterior  -CW  attempt to take sidesteps this pm but pt unable \"please just lay me down\" encouraged pt to keep trying but adamently refused so got pt laid bk down  -NW    Recorded by [CW] Samina Nash PTA  [NW] Jaclyn Castano PTA    Motor Skills/Interventions    Additional Documentation Balance Skills Training (Group)  -CW      Recorded by [CW] Samina Nash PTA      Balance Skills Training    Sitting-Level of Assistance Close supervision;Contact guard  -CW      Sitting-Balance Support Feet supported  -CW      Standing-Level of Assistance Maximum assistance;x2  -CW      Static Standing Balance Support assistive device  -CW      Standing Balance # of Minutes --   leans heavily posterior  -CW      Recorded by [CW] Samina Nash PTA      Therapy Exercises    Bilateral Lower Extremities AROM:;5 reps;sitting;ankle pumps/circles;hip flexion;LAQ "  -CW      Recorded by [CW] Samina Nash PTA      Positioning and Restraints    Pre-Treatment Position in bed  -CW  in bed  -NW    Post Treatment Position bed  -CW  bed  -NW    In Bed supine;call light within reach;encouraged to call for assist;ABD pillow;heels elevated  -CW  fowlers;call light within reach;encouraged to call for assist;notified nsg  -NW    Recorded by [CW] Samina Nash PTA  [NW] Jaclyn Castano PTA      02/04/18 0958          Rehab Assessment/Intervention    Discipline physical therapy assistant  -NW      Document Type therapy note (daily note)  -NW      Subjective Information agree to therapy;complains of;pain  -NW      Patient Effort, Rehab Treatment adequate  -NW      Precautions/Limitations fall precautions;hip precautions- left  -NW      Specific Treatment Considerations pt very anxious needs encouragement to work w/ PT, was more worried about her son coming and she wanted to see him,  -NW      Recorded by [NW] Jaclyn Castano PTA      Pain Assessment    Pain Assessment Tello-Baker FACES  -NW      Tello-Baker FACES Pain Rating 8  -NW      Pain Location Hip  -NW      Pain Orientation Left  -NW      Pain Frequency Constant/continuous  -NW      Pain Intervention(s) Repositioned;Medication (See MAR)  -NW      Recorded by [NW] Jaclyn Castano PTA      Bed Mobility, Assessment/Treatment    Bed Mobility, Assistive Device draw sheet  -NW      Bed Mob, Supine to Sit, [place] verbal cues required;moderate assist (50% patient effort);maximum assist (25% patient effort);2 person assist required  -NW      Bed Mob, Sit to Supine, [place] verbal cues required;maximum assist (25% patient effort);2 person assist required  -NW      Bed Mobility, Safety Issues cognitive deficits limit understanding;decreased use of arms for pushing/pulling;decreased use of legs for bridging/pushing  -NW      Bed Mobility, Impairments strength decreased;pain  -NW      Recorded by [NW] Jaclyn Castano PTA       Transfer Assessment/Treatment    Transfers, Sit-Stand [place] verbal cues required;moderate assist (50% patient effort);2 person assist required  -NW      Transfers, Stand-Sit [place] verbal cues required;moderate assist (50% patient effort)  -NW      Transfers, Sit-Stand-Sit, Assist Device rolling walker  -NW      Transfer, Safety Issues step length decreased;weight-shifting ability decreased  -NW      Transfer, Impairments pain;strength decreased  -NW      Recorded by [NW] Jaclyn Castano PTA      Gait Assessment/Treatment    Gait, [place] Level moderate assist (50% patient effort);2 person assist required  -NW      Gait, Assistive Device rolling walker  -NW      Gait, Distance (Feet) --   few sidesteps towards HOB  -NW      Gait, Gait Deviations step length decreased;stride length decreased  -NW      Gait, Safety Issues step length decreased;balance decreased during turns  -NW      Gait, Impairments pain;strength decreased  -NW      Gait, Comment pt very anxious needs lots of cues for safety  -NW      Recorded by [NW] Jcalyn Castano PTA      Therapy Exercises    Left Lower Extremity --   reviewed ex's but pt refused to follow through w/ them  -NW      Recorded by [NW] Jaclyn Castano PTA      Positioning and Restraints    Pre-Treatment Position in bed  -NW      Post Treatment Position bed  -NW      In Bed fowlers;call light within reach;encouraged to call for assist;exit alarm on;notified nsg  -NW      Recorded by [NW] Jaclyn Castano PTA        User Key  (r) = Recorded By, (t) = Taken By, (c) = Cosigned By    Initials Name Effective Dates    JANETH Jacinto [facility]-SLP 08/02/16 -      Prachi Franco, PTA 08/02/16 -     CJ Cristian Carias, RADER/L 08/02/16 -     TS Nallely Tripp, RADER/L 08/02/16 -     CW Samina Nash, PTA 06/22/15 -     NW Jaclyn Castano, EAN 08/02/16 -               IP SLP Goals       02/07/18 0921 02/06/18 1013 02/05/18 1446    Safely Consume Diet     Safely Consume Diet- SLP, Date Goal Reviewed  02/06/18  -MB     Cognitive Linguistic- Improve Safety and Awareness    Cognitive Linguistic Improve Safety and Awareness- SLP, Date Established   02/05/18  -CS (r) MM (t) CS (c)    Cognitive Linguistic Improve Safety and Awareness- SLP, Time to Achieve   by discharge  -CS (r) MM (t) CS (c)    Cognitive Linguistic Improve Safety and Awareness- SLP, Activity Level   Patient will improve attention skills for increased safety in environment;Patient will improve orientation for increased safety in environment;Patient will improve memory skills for increased safety in environment;Patient will improve functional problem solving skills for increased safety in environment  -CS (r) MM (t) CS (c)    Cognitive Linguistic Improve Safety and Awareness- SLP, Date Goal Reviewed 02/07/18  -MB 02/06/18  -MB 02/05/18  -CS (r) MM (t) CS (c)    Cognitive Linguistic Improve Safety and Awareness- SLP, Outcome   goal ongoing  -CS (r) MM (t) CS (c)      02/03/18 1418          Safely Consume Diet    Safely Consume Diet- SLP, Date Established 02/03/18  -MB      Safely Consume Diet- SLP, Time to Achieve 2 days  -MB      Safely Consume Diet- SLP, Additional Goal Pt will tolerate trials of current diet with no overt s/s of aspiration.  -MB      Safely Consume Diet- SLP, Outcome goal ongoing  -MB        User Key  (r) = Recorded By, (t) = Taken By, (c) = Cosigned By    Initials Name Provider Type    JANETH Jacinto, CCC-SLP Speech and Language Pathologist    CS [place], MS CCC-SLP Speech and Language Pathologist    MM Isabella Soliman, Speech Therapy Student Speech Therapy Student          EDUCATION  The patient has been educated in the following areas:   Cognitive Impairment.    SLP Recommendation and Plan              Anticipated Discharge Disposition: skilled nursing facility                Plan of Care Review  Plan Of Care Reviewed With: patient  Progress: no change  Outcome  Summary/Follow up Plan: Pt completed trials of thin water via cup with no overt s/s of aspiration. Pt had complaints about food, so SLP had an extensive conversation about pt preferences. She appeared generally displeased, stating that everything she has received so far has not been good. SLP notified CTY person for 3A of pt preferences that would be listed in the comments section of her diet order.         SLP Outcome Measures (last 72 hours)      SLP Outcome Measures       02/05/18 1400          SLP Outcome Measures    Outcome Measure Used? Adult NOMS  -CS (r) MM (t) CS (c)      OTHER    SLP Diagnoses Cognitive-Communication Disorder  -CS (r) MM (t) CS (c)      FCM Scores    FCM Chosen Memory  -CS (r) MM (t) CS (c)      Memory FCM Score 4  -CS (r) MM (t) CS (c)        User Key  (r) = Recorded By, (t) = Taken By, (c) = Cosigned By    Initials Name Effective Dates    CS [place], MS CCC-SLP 06/28/17 -     MM Isabella Soliman, Speech Therapy Student 12/12/17 -           Time Calculation:         Time Calculation- SLP       02/07/18 0921          Time Calculation- SLP    SLP Start Time 0910  -MB      SLP Stop Time 0918  -MB      SLP Time Calculation (min) 8 min  -MB      SLP Received On 02/07/18  -MB        User Key  (r) = Recorded By, (t) = Taken By, (c) = Cosigned By    Initials Name Provider Type    JANETH Jacinto CCC-SLP Speech and Language Pathologist          Therapy Charges for Today     Code Description Service Date Service Provider Modifiers Qty    89275035738 HC ST TREATMENT SPEECH 1 2/6/2018 KATARZYNA Strickland, KX 1    43608268933 HC ST TREATMENT SWALLOW 1 2/6/2018 KATARZYNA Strickland, KX 1    [id number] HC ST TREATMENT SPEECH 1 2/7/2018 KATARZYNA Striclkand, KX 1          SLP G-Codes  SLP NOMS Used?: Yes  Functional Limitations: Memory  Swallow Current Status (): At least 1 percent but less than 20 percent impaired, limited or restricted  Swallow Goal Status  (): At least 1 percent but less than 20 percent impaired, limited or restricted  Memory Current Status (): At least 40 percent but less than 60 percent impaired, limited or restricted  Memory Goal Status (): At least 40 percent but less than 60 percent impaired, limited or restricted  Memory Discharge Status (): At least 40 percent but less than 60 percent impaired, limited or restricted    Vamsi Jacinto CCC-SLP  2/7/2018

## 2018-02-07 NOTE — PLAN OF CARE
Problem: Fall Risk (Adult)  Goal: Absence of Falls  Outcome: Ongoing (interventions implemented as appropriate)      Problem: Pressure Ulcer Risk (To Scale) (Adult,Obstetrics,Pediatric)  Goal: Skin Integrity  Outcome: Ongoing (interventions implemented as appropriate)      Problem: Patient Care Overview (Adult)  Goal: Plan of Care Review  Outcome: Ongoing (interventions implemented as appropriate)   02/06/18 1848   Coping/Psychosocial Response Interventions   Plan Of Care Reviewed With patient   Patient Care Overview   Progress no change   Outcome Evaluation   Outcome Summary/Follow up Plan Pain meds given with relief. To go to [facility] tomorrow.      Goal: Adult Individualization and Mutuality  Outcome: Ongoing (interventions implemented as appropriate)    Goal: Discharge Needs Assessment  Outcome: Ongoing (interventions implemented as appropriate)      Problem: Hip Replacement, Total (Adult)  Goal: Signs and Symptoms of Listed Potential Problems Will be Absent or Manageable (Hip Replacement, Total)  Outcome: Ongoing (interventions implemented as appropriate)      Problem: Fluid Volume Deficit (Adult)  Goal: Fluid/Electrolyte Balance  Outcome: Ongoing (interventions implemented as appropriate)    Goal: Comfort/Well Being  Outcome: Ongoing (interventions implemented as appropriate)

## 2018-02-07 NOTE — PLAN OF CARE
Problem: Fall Risk (Adult)  Goal: Absence of Falls  Outcome: Ongoing (interventions implemented as appropriate)      Problem: Pressure Ulcer Risk (To Scale) (Adult,Obstetrics,Pediatric)  Goal: Skin Integrity  Outcome: Ongoing (interventions implemented as appropriate)      Problem: Patient Care Overview (Adult)  Goal: Plan of Care Review  Outcome: Ongoing (interventions implemented as appropriate)   02/07/18 1021   Coping/Psychosocial Response Interventions   Plan Of Care Reviewed With patient   Patient Care Overview   Progress no change   Outcome Evaluation   Outcome Summary/Follow up Plan No neuro changes. Confused at times. Safety maintained. Bed check. Turning. PRN pain medication given upon request. Mepilex to left hip has a scant amount of drainage. Discharged to [facility] via EMS today, report called to [facility].      Goal: Adult Individualization and Mutuality  Outcome: Ongoing (interventions implemented as appropriate)    Goal: Discharge Needs Assessment  Outcome: Ongoing (interventions implemented as appropriate)      Problem: Hip Replacement, Total (Adult)  Goal: Signs and Symptoms of Listed Potential Problems Will be Absent or Manageable (Hip Replacement, Total)  Outcome: Ongoing (interventions implemented as appropriate)      Problem: Fluid Volume Deficit (Adult)  Goal: Fluid/Electrolyte Balance  Outcome: Ongoing (interventions implemented as appropriate)    Goal: Comfort/Well Being  Outcome: Ongoing (interventions implemented as appropriate)

## 2018-02-07 NOTE — PROGRESS NOTES
Continued Stay Note   [facility]     Patient Name: Andres Walter  MRN: [id number]  Today's Date: 2/7/2018    Admit Date: 2/1/2018          Discharge Plan       02/07/18 0850    Case Management/Social Work Plan    Plan [facility].    Final Note    Final Note Melisa with Dr. Roach plans to discharge patient to [facility]. LEANNE called Ariana in admissions at [facility] to notify and Ariana confirmed patient can admit today. Discharge summary, discharge med list, and a copy of scripts to be faxed to facility at [phone]. Patient's nurse will call report to [phone]. Plan EMS transport.               Discharge Codes       02/07/18 0850    Discharge Codes    Discharge Codes 03  Discharged/transferred to skilled nursing facility (SNF) with Medicare certification in anticipation of skilled care        Expected Discharge Date and Time     Expected Discharge Date Expected Discharge Time    Feb 6, 2018             RAMSES De Jesus

## 2018-02-07 NOTE — PLAN OF CARE
Problem: Inpatient SLP  Goal: Cognitive-linguistic-Patient will improve Cognitive-linguistic skills to improve safety and safety awareness in environment  Outcome: Ongoing (interventions implemented as appropriate)   02/05/18 1446 02/07/18 0921   Cognitive Linguistic- Improve Safety and Awareness   Cognitive Linguistic Improve Safety and Awareness- SLP, Date Established 02/05/18 --    Cognitive Linguistic Improve Safety and Awareness- SLP, Time to Achieve by discharge --    Cognitive Linguistic Improve Safety and Awareness- SLP, Activity Level Patient will improve attention skills for increased safety in environment;Patient will improve orientation for increased safety in environment;Patient will improve memory skills for increased safety in environment;Patient will improve functional problem solving skills for increased safety in environment --    Cognitive Linguistic Improve Safety and Awareness- SLP, Date Goal Reviewed --  02/07/18   Cognitive Linguistic Improve Safety and Awareness- SLP, Outcome goal ongoing --

## 2018-02-07 NOTE — PLAN OF CARE
Problem: Patient Care Overview (Adult)  Goal: Plan of Care Review  Outcome: Ongoing (interventions implemented as appropriate)   02/07/18 0954   Coping/Psychosocial Response Interventions   Plan Of Care Reviewed With patient   Patient Care Overview   Progress progress towards functional goals is fair   Outcome Evaluation   Outcome Summary/Follow up Plan pt trans to EOB max of 2, worked on BLE ROM sitting EOB, pt with B foot drop, pt stood mod-max of 2 with rwx, took few side scoots toward HOB, Pt to SNF today

## 2018-02-07 NOTE — PLAN OF CARE
Problem: Fall Risk (Adult)  Goal: Absence of Falls  Outcome: Ongoing (interventions implemented as appropriate)      Problem: Pressure Ulcer Risk (To Scale) (Adult,Obstetrics,Pediatric)  Goal: Skin Integrity  Outcome: Ongoing (interventions implemented as appropriate)      Problem: Patient Care Overview (Adult)  Goal: Plan of Care Review  Outcome: Ongoing (interventions implemented as appropriate)   02/07/18 0304   Coping/Psychosocial Response Interventions   Plan Of Care Reviewed With patient   Patient Care Overview   Progress no change   Outcome Evaluation   Outcome Summary/Follow up Plan Patient rested well through the night. dressing to left hip stained wiith scant dried drainage. VSS, Safety maintained.       Problem: Hip Replacement, Total (Adult)  Goal: Signs and Symptoms of Listed Potential Problems Will be Absent or Manageable (Hip Replacement, Total)  Outcome: Ongoing (interventions implemented as appropriate)      Problem: Fluid Volume Deficit (Adult)  Goal: Fluid/Electrolyte Balance  Outcome: Ongoing (interventions implemented as appropriate)    Goal: Comfort/Well Being  Outcome: Ongoing (interventions implemented as appropriate)

## 2018-02-07 NOTE — THERAPY TREATMENT NOTE
Acute Care - Physical Therapy Treatment Note  [facility]     Patient Name: Andres Walter  : 1929  MRN: [id number]  Today's Date: 2018  Onset of Illness/Injury or Date of Surgery Date: 18  Date of Referral to PT: 18  Referring Physician: Dr. Barger    Admit Date: 2018    Visit Dx:    ICD-10-CM ICD-9-CM   1. Fall, initial encounter W19.XXXA E888.9   2. Closed fracture of left hip, initial encounter S72.002A 820.8   3. Pneumonia of both lungs due to infectious organism, unspecified part of lung J18.9 483.8   4. Acute UTI N39.0 599.0   5. Abnormality of gait and mobility R26.9 781.2   6. Dysphagia, unspecified type R13.10 787.20   7. Impaired mobility and ADLs Z74.09 799.89   8. Impaired cognition R41.89 294.9     Patient Active Problem List   Diagnosis   • Fall               Adult Rehabilitation Note       18 0920 18 0910 18 1405    Rehab Assessment/Intervention    Discipline physical therapy assistant  - speech language pathologist  -MB physical therapy assistant  -CW    Document Type therapy note (daily note)  - therapy note (daily note)  -MB therapy note (daily note)  -    Subjective Information agree to therapy;complains of;weakness;fatigue;pain  -  agree to therapy;complains of;pain;fatigue;weakness  -    Patient Effort, Rehab Treatment   fair  -CW    Precautions/Limitations fall precautions;hip precautions- left  -  fall precautions;hip precautions- left  -    Recorded by [] Prachi Franco PTA [MB] Vamsi Jacinto, CCC-SLP [CW] Samina Nash PTA    Pain Assessment    Pain Assessment Tello-Cabral FACES  -  Tello-Cabral FACES  -CW    Tello-Baker FACES Pain Rating 6  -AH  6  -CW    Pain Type Acute pain;Surgical pain  -  Acute pain;Surgical pain  -CW    Pain Location Hip  -  Hip  -CW    Pain Orientation Left  -AH  Left  -CW    Pain Frequency Intermittent  -  Intermittent  -CW    Pain Intervention(s) Repositioned  -      Response to  Interventions tolerated  -      Recorded by [] Prachi Franco PTA  [CW] Samina Nash PTA    Pain 2    Tello-Baker FACES Pain Rating 2 4  -      Pain Location 2 Back  -      Pain Descriptors 2 Aching  -      Pain Frequency 2 Intermittent  -      Pain Intervention(s) 2 Repositioned  -      Recorded by [] Prachi Franco PTA      Cognitive Assessment/Intervention    Personal Safety Interventions elopement precautions initiated;fall prevention program maintained;gait belt;nonskid shoes/slippers when out of bed  -      Recorded by [] Pracih Franco PTA      Improve memory skills    Memory Skills Progress  20%;with inconsistent cues  -MB     Comments: Improve memory skills  Pt unable to recall information provided by SLP yesterday. Nurse reported pt will be discharging to nursing facility today by ambulance.   -MB     Recorded by  [MB] Vamsi Jacinto CCC-SLP     Bed Mobility, Assessment/Treatment    Bed Mob, Supine to Sit, [place] moderate assist (50% patient effort);maximum assist (25% patient effort);2 person assist required;verbal cues required  -      Bed Mob, Sit to Supine, [place] maximum assist (25% patient effort);2 person assist required;verbal cues required  -      Recorded by [] Prachi Franco PTA      Transfer Assessment/Treatment    Transfers, Sit-Stand [place] maximum assist (25% patient effort);2 person assist required;verbal cues required  -      Transfers, Stand-Sit [place] maximum assist (25% patient effort);moderate assist (50% patient effort);2 person assist required;verbal cues required  -      Transfers, Sit-Stand-Sit, Assist Device rolling walker  -      Transfer, Safety Issues --   leans backwards, B foot drop  -      Recorded by [] Prachi Franco PTA      Gait Assessment/Treatment    Gait, [place] Level moderate assist (50% patient effort);maximum assist (25% patient effort);2 person assist required;verbal cues required   -      Gait, Assistive Device rolling walker  -      Gait, Distance (Feet) --   side scoots toward HOB  -      Gait, Gait Deviations bilateral:   B foot drop  -      Recorded by [] Prachi Franco PTA      Balance Skills Training    Standing-Level of Assistance Moderate assistance;x2  -AH      Static Standing Balance Support assistive device  -      Recorded by [] Prachi Franco PTA      Therapy Exercises    Right Lower Extremity AROM:;AAROM:;15 reps;sitting  -AH  AROM:;AAROM:;15 reps;supine  -CW    Left Lower Extremity AAROM:;15 reps;sitting  -AH  AAROM:;15 reps;supine  -CW    Bilateral Lower Extremities --   gentle heel cord stretch  -      Recorded by [] Prachi Franco PTA  [] Samina Nash PTA    Positioning and Restraints    Pre-Treatment Position in bed  -  in bed  -CW    Post Treatment Position bed  -  bed  -CW    In Bed supine;call light within reach;encouraged to call for assist;exit alarm on;pillow between legs;heels elevated  -  fowlers;call light within reach;encouraged to call for assist;exit alarm on;pillow between legs  -CW    Recorded by [] Prachi Franco PTA  [CW] Samina Nash PTA      02/06/18 1140 02/06/18 1034 02/06/18 0939    Rehab Assessment/Intervention    Discipline physical therapy assistant  -CW occupational therapy assistant  -TS speech language pathologist  -MB    Document Type therapy note (daily note)  -CW --  -TS therapy note (daily note)  -MB    Subjective Information agree to therapy;complains of;pain  -CW  agree to therapy  -MB    Patient Effort, Rehab Treatment fair  -CW  adequate  -MB    Precautions/Limitations fall precautions;hip precautions- left  -CW      Recorded by [CW] Samina Nash PTA [TS] PRASANTH Valdez/RITA [MB] Vamsi Jacinto CCC-SLP    Pain Assessment    Pain Assessment Tello-Cabral FACES  -CW      [place]-Baker FACES Pain Rating 4  -CW      Pain Type Acute pain;Surgical pain  -CW      Pain Location Hip  -CW       Pain Orientation Left  -CW      Pain Frequency Intermittent  -CW      Pain Intervention(s) Repositioned  -CW      Response to Interventions tolerated  -CW      Recorded by [CW] Samina Nash PTA      Improve memory skills    Memory Skills Progress   40%;without cues  -MB    Comments: Improve memory skills   Pt was able to tell SLP some of the things that were on her breakfast tray. She repeated herself 3x about getting one Honey Nut Cheerio and one regular Cheerio package. She appeared to have no memory that she had already told the therapist that information each time.   -MB    Recorded by   [MB] Vamsi Jacinto CCC-SLP    Dysphagia/Swallow Intervention    Dysphagia/Swallow Therapeutic Feed   Pt completed trials of thin water via cup with no overt s/s of aspiration. Pt had complaints about food, so SLP had an extensive conversation about pt preferences. She appeared generally displeased, stating that everything she has received so far has not been good. SLP notified CTY person for 3A of pt preferences that would be listed in the comments section of her diet order.  -MB    Recorded by   [MB] Vamsi Jacinto CCC-SLP    Bed Mobility, Assessment/Treatment    Bed Mob, Sit to Supine, [place] maximum assist (25% patient effort);2 person assist required;verbal cues required  -CW      Bed Mobility, Safety Issues cognitive deficits limit understanding  -CW      Bed Mobility, Impairments strength decreased;pain  -CW      Recorded by [CW] Samina Nash PTA      Transfer Assessment/Treatment    Transfers, Chair-Bed [place] maximum assist (25% patient effort);2 person assist required;verbal cues required;hand held assist  -CW      Transfers, Sit-Stand [place] maximum assist (25% patient effort);2 person assist required;hand held assist  -CW      Transfers, Stand-Sit [place] maximum assist (25% patient effort);2 person assist required  -CW      Transfer, Safety Issues loses balance  backward  -CW      Transfer, Impairments strength decreased;postural control impaired;pain  -CW      Transfer, Comment Stand pivot chair to bed - hard posterior lean  -CW      Recorded by [CW] Samina Nash PTA      Positioning and Restraints    Pre-Treatment Position sitting in chair/recliner  -CW      Post Treatment Position bed  -CW      In Bed notified nsg;supine;call light within reach;exit alarm on;pillow between legs  -CW      Recorded by [CW] Samina Nash PTA        02/06/18 0900 02/05/18 1505 02/05/18 1333    Rehab Assessment/Intervention    Discipline physical therapy assistant  -CW physical therapy assistant  -CW occupational therapy assistant  -TS    Document Type therapy note (daily note)  -CW therapy note (daily note)  -CW therapy note (daily note)  -TS    Subjective Information agree to therapy;complains of;pain;fatigue;swelling   B feet swollen  -CW agree to therapy;complains of;weakness;fatigue;pain  -CW agree to therapy;complains of;pain  -TS    Patient Effort, Rehab Treatment fair  -CW fair  -CW good  -TS    Precautions/Limitations fall precautions;hip precautions- left  -CW fall precautions;hip precautions- left  -CW fall precautions;hip precautions- left  -TS    Recorded by [CW] Samina Nash PTA [CW] Samina Nash PTA [TS] PRASANTH Valdez/L    Pain Assessment    Pain Assessment Tello-[place] FACES  -CW Tello-Baker FACES  -CW 0-10  -TS    Tello-Cabral FACES Pain Rating 4  -CW 4  -CW     Pain Score   4  -TS    Pain Type Acute pain;Surgical pain  -CW Acute pain;Surgical pain  -CW Acute pain;Surgical pain  -TS    Pain Location Hip  -CW Hip  -CW Hip  -TS    Pain Orientation Left  -CW Right;Left  -CW Right;Left  -TS    Pain Descriptors Aching  -CW Aching  -CW Aching  -TS    Pain Frequency Intermittent  -CW Intermittent  -CW Intermittent  -TS    Pain Intervention(s) Repositioned  -CW Repositioned  -CW Repositioned  -TS    Response to Interventions tolerated  -CW tolerated   "-CW pt states that her R hip has more pain than L hip  -TS    Multiple Pain Sites Yes  -CW      Recorded by [CW] Samina Nash PTA [CW] Samina Nash PTA [TS] PRASANTH Valdez/L    Pain 2    Tello-Baker FACES Pain Rating 2 6  -CW      Pain Type 2 Acute pain  -CW      Pain Location 2 Foot  -CW      Pain Orientation 2 Right;Left  -CW      Pain Descriptors 2 Tender   \"hurts\"  -CW      Pain Frequency 2 Constant/continuous  -CW      Pain Intervention(s) 2 Repositioned  -CW      Response to Interventions 2 tolerated  -CW      Recorded by [CW] Samina Nash PTA      Cognitive Assessment/Intervention    Current Cognitive/Communication Assessment impaired  -CW      Recorded by [CW] Samina Nash PTA      Bed Mobility, Assessment/Treatment    Bed Mobility, Assistive Device head of bed elevated;draw sheet  -CW  bed rails;draw sheet  -TS    Bed Mobility, Roll Left, [place]   minimum assist (75% patient effort);2 person assist required  -TS    Bed Mob, Supine to Sit, [place] maximum assist (25% patient effort);2 person assist required  -CW      Bed Mob, Sit to Supine, [place] --   in chair  -CW      Bed Mobility, Safety Issues cognitive deficits limit understanding;decreased use of legs for bridging/pushing  -CW      Bed Mobility, Impairments strength decreased;impaired balance;pain  -CW      Recorded by [CW] Samina Nash PTA  [TS] PRASANTH Valdez/L    Transfer Assessment/Treatment    Transfers, Bed-Chair [place] maximum assist (25% patient effort);2 person assist required;verbal cues required  -CW      Transfers, Bed-Chair-Bed, Assist Device rolling walker  -CW      Transfers, Sit-Stand [place] maximum assist (25% patient effort);2 person assist required;verbal cues required  -CW      Transfers, Stand-Sit [place] maximum assist (25% patient effort);2 person assist required;verbal cues required  -CW      Transfers, Sit-Stand-Sit, Assist Device rolling " walker  -CW      Transfer, Safety Issues weight-shifting ability decreased;loses balance backward  -CW      Transfer, Impairments impaired balance;decreased flexibility;ROM decreased;pain  -CW      Transfer, Comment Hard posterior lean with feet sliding forward  -CW      Recorded by [CW] Samina Nash PTA      Gait Assessment/Treatment    Gait, [place] Level maximum assist (25% patient effort);2 person assist required;verbal cues required  -CW      Gait, Assistive Device rolling walker  -CW      Gait, Distance (Feet) 2  -CW      Gait, Safety Issues loses balance backward  -CW      Gait, Impairments decreased flexibility;ROM decreased;impaired balance;pain  -CW      Gait, Comment Hard posterior lean with feet sliding forward.  -CW      Recorded by [CW] Samina Nash PTA      Lower Body Dressing Assessment/Training    LB Dressing Assess/Train, Clothing Type   doffing:   brief  -TS    LB Dressing Assess/Train, Position   supine  -TS    LB Dressing Assess/Train, [place]   maximum assist (25% patient effort)  -TS    LB Dressing Assess/Train, Impairments   pain;ROM decreased  -TS    Recorded by   [TS] LÓPZE Valdez    Toileting Assessment/Training    Toileting Assess/Train, Position   supine   fowlers  -TS    Toileting Assess/Train, [place]ep[place] Level   supervision required;maximum assist (25% patient effort)  -TS    Toileting Assess/Train, Comment   S for hygiene to jacey area, max A for hygiene to buttocks  -TS    Recorded by   [TS] LÓPEZ Valdez    Therapy Exercises    Right Lower Extremity AAROM:;AROM:;15 reps   reclined in chair  -CW AAROM:;AROM:;10 reps;supine  -CW     Left Lower Extremity PROM:;AAROM:;15 reps   reclined in chair  -CW PROM:;AAROM:;10 reps;supine  -CW     Recorded by [CW] Samina Nash PTA [CW] Samina Nash PTA     Positioning and Restraints    Pre-Treatment Position in bed  -CW in bed  -CW in bed  -TS    Post Treatment Position chair  -CW bed   -CW bed  -TS    In Bed  side lying left;call light within reach;encouraged to call for assist;exit alarm on;pillow between legs  -CW side lying left;call light within reach;encouraged to call for assist;exit alarm on;side rails up x2;pillow between legs  -TS    In Chair reclined;call light within reach;encouraged to call for assist;notified nsg;pillow between legs  -CW      Recorded by [CW] Samina Nash PTA [CW] Samina Nash PTA [TS] FLOR ValdezA/RITA      02/05/18 1105 02/04/18 1500 02/04/18 1342    Rehab Assessment/Intervention    Discipline physical therapy assistant  -CW occupational therapy assistant  -CJ physical therapy assistant  -NW    Document Type therapy note (daily note)  -CW therapy note (daily note)  -CJ therapy note (daily note)  -NW    Subjective Information agree to therapy;complains of;pain;fatigue  -CW  complains of;pain;fatigue  -NW    Patient Effort, Rehab Treatment adequate  -CW  adequate  -NW    Treatment Not Performed  patient/family declined treatment  -CJ     Treatment Not Performed, Comment  --   Pt. refused 2x, states that her son will be here shortly!  -     Precautions/Limitations fall precautions;hip precautions- left  -CW  fall precautions;hip precautions- left  -NW    Specific Treatment Considerations Requires max encouragement to participate  -CW  encourged pt to work w/PT, but wanted to be left alone  -NW    Recorded by [CW] Samina Nash PTA [CJ] PRASANTH Sanchez/RITA [NW] Jaclyn Castano PTA    Pain Assessment    Pain Assessment Tello-Cabral FACES  -CW  Tello-Cabral FACES  -NW    Tello-Baker FACES Pain Rating 10  -CW  10  -NW    Pain Type Acute pain  -CW      Pain Location Hip  -CW  Hip  -NW    Pain Orientation Left  -CW  Left  -NW    Pain Frequency Intermittent   0/10 before movement  -CW  Constant/continuous  -NW    Pain Intervention(s) Repositioned;Medication (See MAR)  -CW  Medication (See MAR);Repositioned  -NW    Response to Interventions  "tolerated  -CW      Recorded by [CW] Samina Nash PTA  [NW] Jaclyn Castano PTA    Bed Mobility, Assessment/Treatment    Bed Mobility, Assistive Device draw sheet  -CW  draw sheet  -NW    Bed Mob, Supine to Sit, [place] maximum assist (25% patient effort);2 person assist required;verbal cues required  -CW  verbal cues required;maximum assist (25% patient effort);2 person assist required  -NW    Bed Mob, Sit to Supine, [place] maximum assist (25% patient effort);2 person assist required;verbal cues required  -CW  verbal cues required;maximum assist (25% patient effort);2 person assist required  -NW    Bed Mobility, Safety Issues cognitive deficits limit understanding;decreased use of legs for bridging/pushing  -CW  cognitive deficits limit understanding  -NW    Bed Mobility, Impairments pain;strength decreased  -CW  pain;strength decreased  -NW    Recorded by [CW] Samina Nash PTA  [NW] Jaclyn Castano PTA    Transfer Assessment/Treatment    Transfers, Sit-Stand [place] moderate assist (50% patient effort);maximum assist (25% patient effort);2 person assist required;verbal cues required  -CW  verbal cues required;moderate assist (50% patient effort);maximum assist (25% patient effort);2 person assist required  -NW    Transfers, Stand-Sit [place] moderate assist (50% patient effort);maximum assist (25% patient effort);2 person assist required;verbal cues required  -CW  verbal cues required;moderate assist (50% patient effort)  -NW    Transfers, Sit-Stand-Sit, Assist Device rolling walker  -CW  rolling walker  -NW    Transfer, Safety Issues loses balance backward  -CW      Transfer, Impairments strength decreased;impaired balance;pain  -CW      Transfer, Comment Stood while nsg checked skin and changed brief.  Leans heavily posterior  -CW  attempt to take sidesteps this pm but pt unable \"please just lay me down\" encouraged pt to keep trying but adamently refused so got pt laid bk " down  -NW    Recorded by [CW] Samina Nash PTA  [NW] Jaclyn Castano PTA    Motor Skills/Interventions    Additional Documentation Balance Skills Training (Group)  -CW      Recorded by [CW] Samina Nash PTA      Balance Skills Training    Sitting-Level of Assistance Close supervision;Contact guard  -CW      Sitting-Balance Support Feet supported  -CW      Standing-Level of Assistance Maximum assistance;x2  -CW      Static Standing Balance Support assistive device  -CW      Standing Balance # of Minutes --   leans heavily posterior  -CW      Recorded by [CW] Samina Nash PTA      Therapy Exercises    Bilateral Lower Extremities AROM:;5 reps;sitting;ankle pumps/circles;hip flexion;LAQ  -CW      Recorded by [CW] Samina Nash PTA      Positioning and Restraints    Pre-Treatment Position in bed  -CW  in bed  -NW    Post Treatment Position bed  -CW  bed  -NW    In Bed supine;call light within reach;encouraged to call for assist;ABD pillow;heels elevated  -CW  fowlers;call light within reach;encouraged to call for assist;notified nsg  -NW    Recorded by [CW] Samina Nash PTA  [NW] Jaclyn Castano PTA      02/04/18 0958          Rehab Assessment/Intervention    Discipline physical therapy assistant  -NW      Document Type therapy note (daily note)  -NW      Subjective Information agree to therapy;complains of;pain  -NW      Patient Effort, Rehab Treatment adequate  -NW      Precautions/Limitations fall precautions;hip precautions- left  -NW      Specific Treatment Considerations pt very anxious needs encouragement to work w/ PT, was more worried about her son coming and she wanted to see him,  -NW      Recorded by [NW] Jaclyn Castano PTA      Pain Assessment    Pain Assessment Tello-Baker FACES  -NW      Tello-Baker FACES Pain Rating 8  -NW      Pain Location Hip  -NW      Pain Orientation Left  -NW      Pain Frequency Constant/continuous  -NW      Pain Intervention(s)  Repositioned;Medication (See MAR)  -NW      Recorded by [NW] Jaclyn Castano PTA      Bed Mobility, Assessment/Treatment    Bed Mobility, Assistive Device draw sheet  -NW      Bed Mob, Supine to Sit, [place] verbal cues required;moderate assist (50% patient effort);maximum assist (25% patient effort);2 person assist required  -NW      Bed Mob, Sit to Supine, [place] verbal cues required;maximum assist (25% patient effort);2 person assist required  -NW      Bed Mobility, Safety Issues cognitive deficits limit understanding;decreased use of arms for pushing/pulling;decreased use of legs for bridging/pushing  -NW      Bed Mobility, Impairments strength decreased;pain  -NW      Recorded by [NW] Jaclyn Castano PTA      Transfer Assessment/Treatment    Transfers, Sit-Stand [place] verbal cues required;moderate assist (50% patient effort);2 person assist required  -NW      Transfers, Stand-Sit [place] verbal cues required;moderate assist (50% patient effort)  -NW      Transfers, Sit-Stand-Sit, Assist Device rolling walker  -NW      Transfer, Safety Issues step length decreased;weight-shifting ability decreased  -NW      Transfer, Impairments pain;strength decreased  -NW      Recorded by [NW] Jaclyn Castano PTA      Gait Assessment/Treatment    Gait, [place] Level moderate assist (50% patient effort);2 person assist required  -NW      Gait, Assistive Device rolling walker  -NW      Gait, Distance (Feet) --   few sidesteps towards HOB  -NW      Gait, Gait Deviations step length decreased;stride length decreased  -NW      Gait, Safety Issues step length decreased;balance decreased during turns  -NW      Gait, Impairments pain;strength decreased  -NW      Gait, Comment pt very anxious needs lots of cues for safety  -NW      Recorded by [NW] Jaclyn Castano PTA      Therapy Exercises    Left Lower Extremity --   reviewed ex's but pt refused to follow through w/ them  -NW      Recorded by [NW]  Jaclyn Castano PTA      Positioning and Restraints    Pre-Treatment Position in bed  -NW      Post Treatment Position bed  -NW      In Bed fowlers;call light within reach;encouraged to call for assist;exit alarm on;notified nsg  -NW      Recorded by [NW] Jaclyn Castano PTA        User Key  (r) = Recorded By, (t) = Taken By, (c) = Cosigned By    Initials Name Effective Dates    MB Vamsi Jacinto, [facility]-SLP 08/02/16 -     AH Prachi Franco, PTA 08/02/16 -     CJ Cristian Carias, RADER/L 08/02/16 -     TS Nallely Tripp, RADER/L 08/02/16 -     CW Samina Nash, PTA 06/22/15 -     NW Jaclyn Castano, PTA 08/02/16 -                 IP PT Goals       02/03/18 1229          Bed Mobility PT LTG    Bed Mobility PT LTG, Date Established 02/03/18  -TB      Bed Mobility PT LTG, Time to Achieve by discharge  -TB      Bed Mobility PT LTG, Activity Type all bed mobility  -TB      Bed Mobility PT LTG, [place] Level supervision required  -TB      Transfer Training PT LTG    Transfer Training PT LTG, Date Established 02/03/18  -TB      Transfer Training PT LTG, Time to Achieve by discharge  -TB      Transfer Training PT LTG, Activity Type all transfers  -TB      Transfer Training PT LTG, [place] Level contact guard assist  -TB      Transfer Training PT LTG, Assist Device walker, rolling  -TB      Gait Training PT LTG    Gait Training Goal PT LTG, Date Established 02/03/18  -TB      Gait Training Goal PT LTG, Time to Achieve by discharge  -TB      Gait Training Goal PT LTG, [place] Level contact guard assist  -TB      Gait Training Goal PT LTG, Assist Device walker, rolling  -TB      Gait Training Goal PT LTG, Distance to Achieve 20  -TB      Gait Training Goal PT LTG, Additional Goal able to stand upright without losing balance posterior with min limping  -TB      Range of Motion PT LTG    Range of Motion Goal PT LTG, Date Established 02/03/18  -TB      Range of Motion Goal PT LTG, Time to Achieve by  discharge  -TB      Range of Motion Goal PT LTG, PROM Measure benja ankle DF >0 deg  -TB        User Key  (r) = Recorded By, (t) = Taken By, (c) = Cosigned By    Initials Name Provider Type    TB Josias White, PT Physical Therapist          Physical Therapy Education     Title: PT OT SLP Therapies (Active)     Topic: Physical Therapy (Active)     Point: Mobility training (Active)    Learning Progress Summary    Learner Readiness Method Response Comment Documented by Status   Patient Acceptance E,D NR Bed mobility, transfers, benefit of activity, plan of care CW 02/06/18 1019 Active    Acceptance E,D NR Bed mobility, transfers, plan of care, benefit of activity CW 02/05/18 1143 Active    Acceptance E VU,NR benefits of activity NW 02/04/18 1037 Done    Acceptance E NR  TB 02/03/18 1227 Active   Family Acceptance E NR  TB 02/03/18 1227 Active               Point: Home exercise program (Active)    Learning Progress Summary    Learner Readiness Method Response Comment Documented by Status   Patient Acceptance E,D NR Bed mobility, transfers, benefit of activity, plan of care CW 02/06/18 1019 Active    Acceptance E,D NR Bed mobility, transfers, plan of care, benefit of activity CW 02/05/18 1143 Active    Acceptance E NR  TB 02/03/18 1227 Active   Family Acceptance E NR  TB 02/03/18 1227 Active               Point: Body mechanics (Active)    Learning Progress Summary    Learner Readiness Method Response Comment Documented by Status   Patient Acceptance E,D NR Bed mobility, transfers, benefit of activity, plan of care CW 02/06/18 1019 Active    Acceptance E,D NR Bed mobility, transfers, plan of care, benefit of activity CW 02/05/18 1143 Active    Acceptance E NR  TB 02/03/18 1227 Active   Family Acceptance E NR  TB 02/03/18 1227 Active               Point: Precautions (Active)    Learning Progress Summary    Learner Readiness Method Response Comment Documented by Status   Patient Acceptance E NR benefits of activity AH  02/07/18 0953 Active    Acceptance E,D NR Bed mobility, transfers, benefit of activity, plan of care  02/06/18 1019 Active    Acceptance E,D NR Bed mobility, transfers, plan of care, benefit of activity  02/05/18 1143 Active    Acceptance E NR  TB 02/03/18 1227 Active   Family Acceptance E NR   02/03/18 1227 Active                      User Key     Initials Effective Dates Name Provider Type Discipline     08/02/16 -  Josias White, PT Physical Therapist PT     08/02/16 -  Prachi Franco, PTA Physical Therapy Assistant PT     06/22/15 -  Samina Nash, PTA Physical Therapy Assistant PT     08/02/16 -  Jaclyn Castano, PTA Physical Therapy Assistant PT                    PT Recommendation and Plan  Anticipated Equipment Needs At Discharge: gait belt, two wheeled walker  Anticipated Discharge Disposition: transitional care  Planned Therapy Interventions: strengthening, ROM (Range of Motion), balance training, bed mobility training, gait training, transfer training  PT Frequency: 2 times/day  Plan of Care Review  Plan Of Care Reviewed With: patient  Progress: progress towards functional goals is fair  Outcome Summary/Follow up Plan: pt trans to EOB max of 2, worked on BLE ROM sitting EOB, pt with B foot drop, pt stood  mod-max of 2 with rwx, took few side scoots toward HOB, Pt to SNF today          Outcome Measures       02/07/18 0900 02/06/18 1000 02/05/18 1400    How much help from another person do you currently need...    Turning from your back to your side while in flat bed without using bedrails? 2  -AH 2  -CW     Moving from lying on back to sitting on the side of a flat bed without bedrails? 2  -AH 2  -CW     Moving to and from a bed to a chair (including a wheelchair)? 2  -AH 2  -CW     Standing up from a chair using your arms (e.g., wheelchair, bedside chair)? 2  -AH 2  -CW     Climbing 3-5 steps with a railing? 1  -AH 1  -CW     To walk in hospital room? 1  -AH 2  -CW     AM-PAC 6 Clicks  Score 10  -AH 11  -CW     How much help from another is currently needed...    Putting on and taking off regular lower body clothing?   2  -TS    Bathing (including washing, rinsing, and drying)   2  -TS    Toileting (which includes using toilet bed pan or urinal)   2  -TS    Putting on and taking off regular upper body clothing   3  -TS    Taking care of personal grooming (such as brushing teeth)   3  -TS    Eating meals   4  -TS    Score   16  -TS    Functional Assessment    Outcome Measure Options AM-PAC 6 Clicks Basic Mobility (PT)  -AH AM-PAC 6 Clicks Basic Mobility (PT)  -CW AM-PAC 6 Clicks Daily Activity (OT)  -TS      02/05/18 1100          How much help from another person do you currently need...    Turning from your back to your side while in flat bed without using bedrails? 2  -CW      Moving from lying on back to sitting on the side of a flat bed without bedrails? 2  -CW      Moving to and from a bed to a chair (including a wheelchair)? 1  -CW      Standing up from a chair using your arms (e.g., wheelchair, bedside chair)? 2  -CW      Climbing 3-5 steps with a railing? 1  -CW      To walk in hospital room? 1  -CW      AM-PAC 6 Clicks Score 9  -CW      Functional Assessment    Outcome Measure Options AM-PAC 6 Clicks Basic Mobility (PT)  -CW        User Key  (r) = Recorded By, (t) = Taken By, (c) = Cosigned By    Initials Name Provider Type     Prachi Franco, EAN Physical Therapy Assistant     LÓPEZ Valdez Occupational Therapy Assistant    MÓNICA Nash PTA Physical Therapy Assistant           Time Calculation:         PT Charges       02/07/18 0955          Time Calculation    Start Time 0920  -      Stop Time 0946  -      Time Calculation (min) 26 min  -      PT Received On 02/07/18  -      PT Goal Re-Cert Due Date 02/13/18  -      Time Calculation- PT    Total Timed Code Minutes- PT 26 minute(s)  -        User Key  (r) = Recorded By, (t) = Taken By, (c) =  Cosigned By    Initials Name Provider Type     Prachi Franco PTA Physical Therapy Assistant          Therapy Charges for Today     Code Description Service Date Service Provider Modifiers Qty    39749090998 HC PT THERAPEUTIC ACT EA 15 MIN 2/7/2018 Prachi Franco PTA GP, KX 1    49649813503 HC PT THER PROC EA 15 MIN 2/7/2018 Prachi Franco PTA GP, KX 1          PT G-Codes  Outcome Measure Options: AM-PAC 6 Clicks Basic Mobility (PT)  Score: 9  Functional Limitation: Mobility: Walking and moving around  Mobility: Walking and Moving Around Current Status (): At least 60 percent but less than 80 percent impaired, limited or restricted  Mobility: Walking and Moving Around Goal Status (): At least 40 percent but less than 60 percent impaired, limited or restricted    Prachi Franco PTA  2/7/2018

## 2018-02-08 NOTE — THERAPY DISCHARGE NOTE
Acute Care - Physical Therapy Discharge Summary  [facility]       Patient Name: Andres Walter  : 1929  MRN: [id number]    Today's Date: 2018  Onset of Illness/Injury or Date of Surgery Date: 18    Date of Referral to PT: 18  Referring Physician: Dr. Barger      Admit Date: 2018      PT Recommendation and Plan    Visit Dx:    ICD-10-CM ICD-9-CM   1. Fall, initial encounter W19.XXXA E888.9   2. Closed fracture of left hip, initial encounter S72.002A 820.8   3. Pneumonia of both lungs due to infectious organism, unspecified part of lung J18.9 483.8   4. Acute UTI N39.0 599.0   5. Abnormality of gait and mobility R26.9 781.2   6. Dysphagia, unspecified type R13.10 787.20   7. Impaired mobility and ADLs Z74.09 799.89   8. Impaired cognition R41.89 294.9             Outcome Measures       18 0900 18 1000 18 1400    How much help from another person do you currently need...    Turning from your back to your side while in flat bed without using bedrails? 2  -AH 2  -CW     Moving from lying on back to sitting on the side of a flat bed without bedrails? 2  -AH 2  -CW     Moving to and from a bed to a chair (including a wheelchair)? 2  -AH 2  -CW     Standing up from a chair using your arms (e.g., wheelchair, bedside chair)? 2  -AH 2  -CW     Climbing 3-5 steps with a railing? 1  -AH 1  -CW     To walk in hospital room? 1  -AH 2  -CW     AM-PAC 6 Clicks Score 10  -AH 11  -CW     How much help from another is currently needed...    Putting on and taking off regular lower body clothing?   2  -TS    Bathing (including washing, rinsing, and drying)   2  -TS    Toileting (which includes using toilet bed pan or urinal)   2  -TS    Putting on and taking off regular upper body clothing   3  -TS    Taking care of personal grooming (such as brushing teeth)   3  -TS    Eating meals   4  -TS    Score   16  -TS    Functional Assessment    Outcome Measure Options AM-PAC 6 Clicks Basic Mobility  (PT)  - AM-PAC 6 Clicks Basic Mobility (PT)  -CW AM-[facility] 6 Clicks Daily Activity (OT)  -TS      02/05/18 1100          How much help from another person do you currently need...    Turning from your back to your side while in flat bed without using bedrails? 2  -CW      Moving from lying on back to sitting on the side of a flat bed without bedrails? 2  -CW      Moving to and from a bed to a chair (including a wheelchair)? 1  -CW      Standing up from a chair using your arms (e.g., wheelchair, bedside chair)? 2  -CW      Climbing 3-5 steps with a railing? 1  -CW      To walk in hospital room? 1  -CW      AM-PAC 6 Clicks Score 9  -CW      Functional Assessment    Outcome Measure Options AM-PAC 6 Clicks Basic Mobility (PT)  -CW        User Key  (r) = Recorded By, (t) = Taken By, (c) = Cosigned By    Initials Name Provider Type     Prachi Franco, EAN Physical Therapy Assistant     PRASANTH Valdez/L Occupational Therapy Assistant     Samina Nash, EAN Physical Therapy Assistant                      IP PT Goals       02/08/18 0724 02/03/18 1229       Bed Mobility PT LTG    Bed Mobility PT LTG, Date Established  02/03/18  -TB     Bed Mobility PT LTG, Time to Achieve  by discharge  -TB     Bed Mobility PT LTG, Activity Type  all bed mobility  -TB     Bed Mobility PT LTG, Rosie Level  supervision required  -TB     Bed Mobility PT LTG, Date Goal Reviewed 02/08/18  -CW      Bed Mobility PT LTG, Outcome goal not met  -CW      Bed Mobility PT LTG, Reason Goal Not Met discharged from facility  -CW      Transfer Training PT LTG    Transfer Training PT LTG, Date Established  02/03/18  -TB     Transfer Training PT LTG, Time to Achieve  by discharge  -TB     Transfer Training PT LTG, Activity Type  all transfers  -TB     Transfer Training PT LTG, Rosie Level  contact guard assist  -TB     Transfer Training PT LTG, Assist Device  walker, rolling  -TB     Transfer Training PT  LTG, Date Goal  Reviewed 02/08/18  -CW      Transfer Training PT LTG, Outcome goal not met  -CW      Transfer Training PT LTG, Reason Goal Not Met discharged from facility  -CW      Gait Training PT LTG    Gait Training Goal PT LTG, Date Established  02/03/18  -TB     Gait Training Goal PT LTG, Time to Achieve  by discharge  -TB     Gait Training Goal PT LTG, [place] Level  contact guard assist  -TB     Gait Training Goal PT LTG, Assist Device  walker, rolling  -TB     Gait Training Goal PT LTG, Distance to Achieve  20  -TB     Gait Training Goal PT LTG, Additional Goal  able to stand upright without losing balance posterior with min limping  -TB     Gait Training Goal PT LTG, Date Goal Reviewed 02/08/18  -CW      Gait Training Goal PT LTG, Outcome goal not met  -CW      Gait Training Goal PT LTG, Reason Goal Not Met discharged from facility  -CW      Range of Motion PT LTG    Range of Motion Goal PT LTG, Date Established  02/03/18  -TB     Range of Motion Goal PT LTG, Time to Achieve  by discharge  -TB     Range of Motion Goal PT LTG, PROM Measure  benja ankle DF >0 deg  -TB     Range of Motion Goal PT LTG, Date Goal Reviewed 02/08/18  -CW      Range of Motion Goal PT LTG, Outcome goal not met  -CW      Reason Goal Not Met (ROM) PT LTG discharged from facility  -CW        User Key  (r) = Recorded By, (t) = Taken By, (c) = Cosigned By    Initials Name Provider Type    TB Josias White, PT Physical Therapist    CW Samina Nash, PTA Physical Therapy Assistant              PT Discharge Summary  Reason for Discharge: Discharge from facility  Outcomes Achieved: Refer to plan of care for updates on goals achieved  Discharge Destination: [facility]      Samina Nash PTA   2/8/2018

## 2018-02-08 NOTE — THERAPY DISCHARGE NOTE
Acute Care - Occupational Therapy Discharge Summary  [facility]     Patient Name: Andres Walter  : 1929  MRN: [id number]    Today's Date: 2018  Onset of Illness/Injury or Date of Surgery Date: 18    Date of Referral to OT: 18  Referring Physician: Dr. Barger      Admit Date: 2018        OT Recommendation and Plan    Visit Dx:    ICD-10-CM ICD-9-CM   1. Fall, initial encounter W19.XXXA E888.9   2. Closed fracture of left hip, initial encounter S72.002A 820.8   3. Pneumonia of both lungs due to infectious organism, unspecified part of lung J18.9 483.8   4. Acute UTI N39.0 599.0   5. Abnormality of gait and mobility R26.9 781.2   6. Dysphagia, unspecified type R13.10 787.20   7. Impaired mobility and ADLs Z74.09 799.89   8. Impaired cognition R41.89 294.9                     OT Goals       18 0708 18 1432       Transfer Training OT LTG    Transfer Training OT LTG, Date Established  18  -AC (r) MK (t) AC (c)     Transfer Training OT LTG, Time to Achieve  by discharge  -AC (r) MK (t) AC (c)     Transfer Training OT LTG, Activity Type  toilet;tub  -AC (r) MK (t) AC (c)     Transfer Training OT LTG, [place] Level  moderate assist (50% patient effort)  -AC (r) MK (t) AC (c)     Transfer Training OT LTG, Assist Device  tub bench;commode, bedside  -AC (r) MK (t) AC (c)     Transfer Training OT LTG, Date Goal Reviewed 18  -TS      Transfer Training OT LTG, Outcome goal not met  -TS      Transfer Training OT LTG, Reason Goal Not Met discharged from facility  -TS      Patient Education OT LTG    Patient Education OT LTG, Date Established  18  -AC (r) MK (t) AC (c)     Patient Education OT LTG, Time to Achieve  by discharge  -AC (r) MK (t) AC (c)     Patient Education OT LTG, Education Type  precautions per surgeon;home safety;adaptive equipment mgmt  -AC (r) MK (t) AC (c)     Patient Education OT LTG, Education Understanding  demonstrates adequately  -EV (r) DIONNE (t) AC  (c)     Patient Education OT LTG, Additional Goal  with min vc  -AC (r) MK (t) AC (c)     Patient Education OT LTG, Date Goal Reviewed 02/08/18  -TS      Patient Education OT LTG Outcome goal not met  -      Patient Education OT LTG, Reason Goal Not Met discharged from facility  -TS      Toileting OT LTG    Toileting Goal OT LTG, Date Established  02/03/18  -AC (r) MK (t) AC (c)     Toileting Goal OT LTG, Time to Achieve  by discharge  -AC (r) MK (t) AC (c)     Toileting Goal OT LTG, [place] Level  moderate assist (50% patient effort)  -AC (r) MK (t) AC (c)     Toileting Goal OT LTG, Date Goal Reviewed 02/08/18  -      Toileting Goal OT LTG, Outcome goal not met  -      Toileting Goal OT LTG, Reason Goal Not Met discharged from facility  -      LB Dressing OT LTG    LB Dressing Goal OT LTG, Date Established  02/03/18  -AC (r) MK (t) AC (c)     LB Dressing Goal OT LTG, Time to Achieve  by discharge  -AC (r) MK (t) AC (c)     LB Dressing Goal OT LTG, [place] Level  moderate assist (50% patient effort)  -AC (r) MK (t) AC (c)     LB Dressing Goal OT LTG, Adaptive Equipment  reacher;sock-aid;dressing stick  -AC (r) MK (t) AC (c)     LB Dressing Goal OT LTG, Date Goal Reviewed 02/08/18  -      LB Dressing Goal OT LTG, Outcome goal not met  -      LB Dressing Goal OT LTG, Reason Goal Not Met discharged from facility  -        User Key  (r) = Recorded By, (t) = Taken By, (c) = Cosigned By    Initials Name Provider Type    AC Дмитрий Jacobsen OTR/L Occupational Therapist    PRASANTH Elizondo/L Occupational Therapy Assistant    DIONNE Cavazos, OT Student OT Student                Outcome Measures       02/07/18 0900 02/06/18 1000 02/05/18 1400    How much help from another person do you currently need...    Turning from your back to your side while in flat bed without using bedrails? 2  -AH 2  -CW     Moving from lying on back to sitting on the side of a flat bed without bedrails? 2  -AH 2   -CW     Moving to and from a bed to a chair (including a wheelchair)? 2  -AH 2  -CW     Standing up from a chair using your arms (e.g., wheelchair, bedside chair)? 2  -AH 2  -CW     Climbing 3-5 steps with a railing? 1  -AH 1  -CW     To walk in hospital room? 1  -AH 2  -CW     AM-PAC 6 Clicks Score 10  -AH 11  -CW     How much help from another is currently needed...    Putting on and taking off regular lower body clothing?   2  -TS    Bathing (including washing, rinsing, and drying)   2  -TS    Toileting (which includes using toilet bed pan or urinal)   2  -TS    Putting on and taking off regular upper body clothing   3  -TS    Taking care of personal grooming (such as brushing teeth)   3  -TS    Eating meals   4  -TS    Score   16  -TS    Functional Assessment    Outcome Measure Options AM-PAC 6 Clicks Basic Mobility (PT)  -AH AM-PAC 6 Clicks Basic Mobility (PT)  -CW AM-PAC 6 Clicks Daily Activity (OT)  -TS      02/05/18 1100          How much help from another person do you currently need...    Turning from your back to your side while in flat bed without using bedrails? 2  -CW      Moving from lying on back to sitting on the side of a flat bed without bedrails? 2  -CW      Moving to and from a bed to a chair (including a wheelchair)? 1  -CW      Standing up from a chair using your arms (e.g., wheelchair, bedside chair)? 2  -CW      Climbing 3-5 steps with a railing? 1  -CW      To walk in hospital room? 1  -CW      AM-PAC 6 Clicks Score 9  -CW      Functional Assessment    Outcome Measure Options AM-PAC 6 Clicks Basic Mobility (PT)  -CW        User Key  (r) = Recorded By, (t) = Taken By, (c) = Cosigned By    Initials Name Provider Type    MOHINI Franco PTA Physical Therapy Assistant     LÓPEZ Valdez Occupational Therapy Assistant    MÓNICA Nash PTA Physical Therapy Assistant              OT Discharge Summary  Reason for Discharge: Discharge from facility  Outcomes Achieved:  Refer to plan of care for updates on goals achieved  Discharge Destination: SNF      PRASANTH Salguero/RITA  2/8/2018

## 2018-02-08 NOTE — THERAPY DISCHARGE NOTE
Acute Care - Speech Language Pathology Discharge Summary  [facility]       Patient Name: Andres Walter  : 1929  MRN: [id number]    Today's Date: 2018  Onset of Illness/Injury or Date of Surgery Date: 18    Date of Referral to SLP: 18  Referring Physician: DESTINEE Roach        Admit Date: 2018      SLP Recommendation and Plan  Regular solids and thin liquids    Visit Dx:    ICD-10-CM ICD-9-CM   1. Fall, initial encounter W19.XXXA E888.9   2. Closed fracture of left hip, initial encounter S72.002A 820.8   3. Pneumonia of both lungs due to infectious organism, unspecified part of lung J18.9 483.8   4. Acute UTI N39.0 599.0   5. Abnormality of gait and mobility R26.9 781.2   6. Dysphagia, unspecified type R13.10 787.20   7. Impaired mobility and ADLs Z74.09 799.89   8. Impaired cognition R41.89 294.9                     IP SLP Goals       18 1357 18 0921 18 1013    Safely Consume Diet    Safely Consume Diet- SLP, Date Goal Reviewed   18  -MB    Safely Consume Diet- SLP, Outcome goal met  -MB      Cognitive Linguistic- Improve Safety and Awareness    Cognitive Linguistic Improve Safety and Awareness- SLP, Date Goal Reviewed  18  -MB 18  -MB    Cognitive Linguistic Improve Safety and Awareness- SLP, Outcome goal not met  -MB      Cognitive Linguistic Improve Safety and Awareness- SLP, Reason Goal Not Met discharged from facility  -MB        18 1446 18 1418       Safely Consume Diet    Safely Consume Diet- SLP, Date Established  18  -MB     Safely Consume Diet- SLP, Time to Achieve  2 days  -MB     Safely Consume Diet- SLP, Additional Goal  Pt will tolerate trials of current diet with no overt s/s of aspiration.  -MB     Safely Consume Diet- SLP, Outcome  goal ongoing  -MB     Cognitive Linguistic- Improve Safety and Awareness    Cognitive Linguistic Improve Safety and Awareness- SLP, Date Established 18  -CS (r) MM (t) CS (c)       Cognitive Linguistic Improve Safety and Awareness- SLP, Time to Achieve by discharge  -CS (r) MM (t) CS (c)      Cognitive Linguistic Improve Safety and Awareness- SLP, Activity Level Patient will improve attention skills for increased safety in environment;Patient will improve orientation for increased safety in environment;Patient will improve memory skills for increased safety in environment;Patient will improve functional problem solving skills for increased safety in environment  -CS (r) MM (t) CS (c)      Cognitive Linguistic Improve Safety and Awareness- SLP, Date Goal Reviewed 02/05/18  -CS (r) MM (t) CS (c)      Cognitive Linguistic Improve Safety and Awareness- SLP, Outcome goal ongoing  -CS (r) MM (t) CS (c)        User Key  (r) = Recorded By, (t) = Taken By, (c) = Cosigned By    Initials Name Provider Type    JANETH Jacinto, CCC-SLP Speech and Language Pathologist    LILLIANA Grove, MS CCC-SLP Speech and Language Pathologist    MM Isabella Soliman, Speech Therapy Student Speech Therapy Student            Therapy Charges for Today     Code Description Service Date Service Provider Modifiers Qty    [id number]  ST TREATMENT SPEECH 1 2/7/2018 Vamsi Jacinto CCC-SLP GN, KX 1            SLP Discharge Summary  Anticipated Discharge Disposition: skilled nursing facility  Reason for Discharge: Discharge from facility  Outcomes Achieved: Patient able to partially acheive established goals  Discharge Destination: SNF      Vamsi Jacinto CCC-SLP  2/8/2018

## 2018-02-08 NOTE — PLAN OF CARE
Problem: Inpatient Physical Therapy  Goal: Bed Mobility Goal LTG- PT  Outcome: Unable to achieve outcome(s) by discharge Date Met: 02/08/18 02/08/18 0724   Bed Mobility PT LTG   Bed Mobility PT LTG, Date Goal Reviewed 02/08/18   Bed Mobility PT LTG, Outcome goal not met   Bed Mobility PT LTG, Reason Goal Not Met discharged from facility     Goal: Transfer Training Goal 1 LTG- PT  Outcome: Unable to achieve outcome(s) by discharge Date Met: 02/08/18 02/08/18 0724   Transfer Training PT LTG   Transfer Training PT LTG, Date Goal Reviewed 02/08/18   Transfer Training PT LTG, Outcome goal not met   Transfer Training PT LTG, Reason Goal Not Met discharged from facility     Goal: Gait Training Goal LTG- PT  Outcome: Unable to achieve outcome(s) by discharge Date Met: 02/08/18 02/08/18 0724   Gait Training PT LTG   Gait Training Goal PT LTG, Date Goal Reviewed 02/08/18   Gait Training Goal PT LTG, Outcome goal not met   Gait Training Goal PT LTG, Reason Goal Not Met discharged from facility     Goal: Range of Motion Goal LTG- PT  Outcome: Unable to achieve outcome(s) by discharge Date Met: 02/08/18 02/08/18 0724   Range of Motion PT LTG   Range of Motion Goal PT LTG, Date Goal Reviewed 02/08/18   Range of Motion Goal PT LTG, Outcome goal not met   Reason Goal Not Met (ROM) PT LTG discharged from facility

## 2018-02-08 NOTE — PLAN OF CARE
Problem: Inpatient SLP  Goal: Dysphagia- Patient will safely consume diet as per recommendation with no signs/symptoms of aspiration  Outcome: Outcome(s) achieved Date Met: 02/08/18 02/03/18 1418 02/06/18 1013 02/08/18 1357   Safely Consume Diet   Safely Consume Diet- SLP, Date Established 02/03/18 --  --    Safely Consume Diet- SLP, Time to Achieve 2 days --  --    Safely Consume Diet- SLP, Additional Goal Pt will tolerate trials of current diet with no overt s/s of aspiration. --  --    Safely Consume Diet- SLP, Date Goal Reviewed --  02/06/18 --    Safely Consume Diet- SLP, Outcome --  --  goal met     Goal: Cognitive-linguistic-Patient will improve Cognitive-linguistic skills to improve safety and safety awareness in environment  Outcome: Unable to achieve outcome(s) by discharge Date Met: 02/08/18 02/05/18 1446 02/07/18 0921 02/08/18 1357   Cognitive Linguistic- Improve Safety and Awareness   Cognitive Linguistic Improve Safety and Awareness- SLP, Date Established 02/05/18 --  --    Cognitive Linguistic Improve Safety and Awareness- SLP, Time to Achieve by discharge --  --    Cognitive Linguistic Improve Safety and Awareness- SLP, Activity Level Patient will improve attention skills for increased safety in environment;Patient will improve orientation for increased safety in environment;Patient will improve memory skills for increased safety in environment;Patient will improve functional problem solving skills for increased safety in environment --  --    Cognitive Linguistic Improve Safety and Awareness- SLP, Date Goal Reviewed --  02/07/18 --    Cognitive Linguistic Improve Safety and Awareness- SLP, Outcome --  --  goal not met   Cognitive Linguistic Improve Safety and Awareness- SLP, Reason Goal Not Met --  --  discharged from facility

## 2018-02-12 ENCOUNTER — LAB REQUISITION (OUTPATIENT)
Dept: LAB | Facility: HOSPITAL | Age: 83
End: 2018-02-12

## 2018-02-12 DIAGNOSIS — Z00.00 ENCOUNTER FOR GENERAL ADULT MEDICAL EXAMINATION WITHOUT ABNORMAL FINDINGS: ICD-10-CM

## 2018-02-12 LAB
ALBUMIN SERPL-MCNC: 2 G/DL (ref 3.5–5)
ALBUMIN/GLOB SERPL: 0.8 G/DL (ref 1.1–2.5)
ALP SERPL-CCNC: 60 U/L (ref 24–120)
ALT SERPL W P-5'-P-CCNC: 40 U/L (ref 0–54)
ANION GAP SERPL CALCULATED.3IONS-SCNC: 4 MMOL/L (ref 4–13)
AST SERPL-CCNC: 35 U/L (ref 7–45)
BASOPHILS # BLD AUTO: 0.02 10*3/MM3 (ref 0–0.2)
BASOPHILS NFR BLD AUTO: 0.3 % (ref 0–2)
BILIRUB SERPL-MCNC: <0.1 MG/DL (ref 0.1–1)
BUN BLD-MCNC: 23 MG/DL (ref 5–21)
BUN/CREAT SERPL: 25.8 (ref 7–25)
CALCIUM SPEC-SCNC: 8 MG/DL (ref 8.4–10.4)
CHLORIDE SERPL-SCNC: 105 MMOL/L (ref 98–110)
CO2 SERPL-SCNC: 30 MMOL/L (ref 24–31)
CREAT BLD-MCNC: 0.89 MG/DL (ref 0.5–1.4)
DEPRECATED RDW RBC AUTO: 51.5 FL (ref 40–54)
EOSINOPHIL # BLD AUTO: 0.21 10*3/MM3 (ref 0–0.7)
EOSINOPHIL NFR BLD AUTO: 3.1 % (ref 0–4)
ERYTHROCYTE [DISTWIDTH] IN BLOOD BY AUTOMATED COUNT: 14.6 % (ref 12–15)
GFR SERPL CREATININE-BSD FRML MDRD: 60 ML/MIN/1.73
GLOBULIN UR ELPH-MCNC: 2.5 GM/DL
GLUCOSE BLD-MCNC: 82 MG/DL (ref 70–100)
HCT VFR BLD AUTO: 25.8 % (ref 37–47)
HGB BLD-MCNC: 8.3 G/DL (ref 12–16)
IMM GRANULOCYTES # BLD: 0.07 10*3/MM3 (ref 0–0.03)
IMM GRANULOCYTES NFR BLD: 1 % (ref 0–5)
LYMPHOCYTES # BLD AUTO: 1.83 10*3/MM3 (ref 0.72–4.86)
LYMPHOCYTES NFR BLD AUTO: 26.9 % (ref 15–45)
MCH RBC QN AUTO: 31.3 PG (ref 28–32)
MCHC RBC AUTO-ENTMCNC: 32.2 G/DL (ref 33–36)
MCV RBC AUTO: 97.4 FL (ref 82–98)
MONOCYTES # BLD AUTO: 0.68 10*3/MM3 (ref 0.19–1.3)
MONOCYTES NFR BLD AUTO: 10 % (ref 4–12)
NEUTROPHILS # BLD AUTO: 4 10*3/MM3 (ref 1.87–8.4)
NEUTROPHILS NFR BLD AUTO: 58.7 % (ref 39–78)
NRBC BLD MANUAL-RTO: 0 /100 WBC (ref 0–0)
PLATELET # BLD AUTO: 408 10*3/MM3 (ref 130–400)
PMV BLD AUTO: 10.1 FL (ref 6–12)
POTASSIUM BLD-SCNC: 4.5 MMOL/L (ref 3.5–5.3)
PROT SERPL-MCNC: 4.5 G/DL (ref 6.3–8.7)
RBC # BLD AUTO: 2.65 10*6/MM3 (ref 4.2–5.4)
SODIUM BLD-SCNC: 139 MMOL/L (ref 135–145)
WBC NRBC COR # BLD: 6.81 10*3/MM3 (ref 4.8–10.8)

## 2018-02-12 PROCEDURE — 80053 COMPREHEN METABOLIC PANEL: CPT | Performed by: NURSE PRACTITIONER

## 2018-02-12 PROCEDURE — 36415 COLL VENOUS BLD VENIPUNCTURE: CPT | Performed by: NURSE PRACTITIONER

## 2018-02-12 PROCEDURE — 85025 COMPLETE CBC W/AUTO DIFF WBC: CPT | Performed by: NURSE PRACTITIONER

## 2018-03-13 ENCOUNTER — LAB REQUISITION (OUTPATIENT)
Dept: LAB | Facility: HOSPITAL | Age: 83
End: 2018-03-13

## 2018-03-13 DIAGNOSIS — Z00.00 ENCOUNTER FOR GENERAL ADULT MEDICAL EXAMINATION WITHOUT ABNORMAL FINDINGS: ICD-10-CM

## 2018-03-13 LAB
BACTERIA UR QL AUTO: ABNORMAL /HPF
BILIRUB UR QL STRIP: NEGATIVE
CLARITY UR: CLEAR
COLOR UR: YELLOW
GLUCOSE UR STRIP-MCNC: NEGATIVE MG/DL
HGB UR QL STRIP.AUTO: NEGATIVE
HYALINE CASTS UR QL AUTO: ABNORMAL /LPF
KETONES UR QL STRIP: NEGATIVE
LEUKOCYTE ESTERASE UR QL STRIP.AUTO: ABNORMAL
NITRITE UR QL STRIP: POSITIVE
PH UR STRIP.AUTO: <=5 [PH] (ref 5–8)
PROT UR QL STRIP: NEGATIVE
RBC # UR: ABNORMAL /HPF
REF LAB TEST METHOD: ABNORMAL
SP GR UR STRIP: 1.02 (ref 1–1.03)
SQUAMOUS #/AREA URNS HPF: ABNORMAL /HPF
UROBILINOGEN UR QL STRIP: ABNORMAL
WBC UR QL AUTO: ABNORMAL /HPF

## 2018-03-13 PROCEDURE — 81001 URINALYSIS AUTO W/SCOPE: CPT

## 2018-03-13 PROCEDURE — 87086 URINE CULTURE/COLONY COUNT: CPT

## 2018-03-13 PROCEDURE — 87186 SC STD MICRODIL/AGAR DIL: CPT

## 2018-03-13 PROCEDURE — 87088 URINE BACTERIA CULTURE: CPT

## 2018-03-15 LAB
BACTERIA SPEC AEROBE CULT: ABNORMAL
BACTERIA SPEC AEROBE CULT: ABNORMAL

## 2018-03-23 ENCOUNTER — APPOINTMENT (OUTPATIENT)
Dept: GENERAL RADIOLOGY | Facility: HOSPITAL | Age: 83
End: 2018-03-23

## 2018-03-23 ENCOUNTER — HOSPITAL ENCOUNTER (EMERGENCY)
Facility: HOSPITAL | Age: 83
Discharge: HOME | End: 2018-03-23
Attending: EMERGENCY MEDICINE | Admitting: EMERGENCY MEDICINE

## 2018-03-23 VITALS
SYSTOLIC BLOOD PRESSURE: 145 MMHG | DIASTOLIC BLOOD PRESSURE: 65 MMHG | OXYGEN SATURATION: 98 % | TEMPERATURE: 98.7 F | HEIGHT: 60 IN | BODY MASS INDEX: 17.67 KG/M2 | WEIGHT: 90 LBS | HEART RATE: 60 BPM | RESPIRATION RATE: 14 BRPM

## 2018-03-23 DIAGNOSIS — Z13.9 ENCOUNTER FOR MEDICAL SCREENING EXAMINATION: Primary | ICD-10-CM

## 2018-03-23 PROCEDURE — 93010 ELECTROCARDIOGRAM REPORT: CPT | Performed by: INTERNAL MEDICINE

## 2018-03-23 PROCEDURE — 99284 EMERGENCY DEPT VISIT MOD MDM: CPT

## 2018-03-23 PROCEDURE — 93005 ELECTROCARDIOGRAM TRACING: CPT | Performed by: EMERGENCY MEDICINE

## 2018-03-23 RX ORDER — TIMOLOL MALEATE 5 MG/ML
1 SOLUTION/ DROPS OPHTHALMIC 2 TIMES DAILY
Status: ON HOLD | COMMUNITY
End: 2020-08-10 | Stop reason: SDUPTHER

## 2018-03-23 RX ORDER — LEVOTHYROXINE SODIUM 0.1 MG/1
100 TABLET ORAL DAILY
Status: ON HOLD | COMMUNITY
End: 2020-08-10 | Stop reason: DRUGHIGH

## 2018-03-23 RX ORDER — ATORVASTATIN CALCIUM 20 MG/1
20 TABLET, FILM COATED ORAL DAILY
COMMUNITY
End: 2020-08-17 | Stop reason: HOSPADM

## 2018-03-23 RX ORDER — BRIMONIDINE TARTRATE 2 MG/ML
1 SOLUTION/ DROPS OPHTHALMIC 3 TIMES DAILY
Status: ON HOLD | COMMUNITY
End: 2020-08-10 | Stop reason: DRUGHIGH

## 2018-03-23 RX ORDER — FERROUS SULFATE 325(65) MG
325 TABLET ORAL
Status: ON HOLD | COMMUNITY
End: 2020-08-10 | Stop reason: DRUGHIGH

## 2018-03-23 RX ORDER — DOCUSATE SODIUM 100 MG/1
100 CAPSULE, LIQUID FILLED ORAL 2 TIMES DAILY
Status: ON HOLD | COMMUNITY
End: 2020-08-10 | Stop reason: DRUGHIGH

## 2018-03-23 RX ORDER — POLYETHYLENE GLYCOL 3350 17 G/17G
17 POWDER, FOR SOLUTION ORAL DAILY
Status: ON HOLD | COMMUNITY
End: 2020-08-10 | Stop reason: SDUPTHER

## 2018-03-23 RX ORDER — SERTRALINE HYDROCHLORIDE 25 MG/1
25 TABLET, FILM COATED ORAL DAILY
Status: ON HOLD | COMMUNITY
End: 2020-08-11

## 2018-03-23 RX ORDER — ACETAMINOPHEN 325 MG/1
650 TABLET ORAL EVERY 6 HOURS PRN
Status: ON HOLD | COMMUNITY
End: 2020-08-10 | Stop reason: SDUPTHER

## 2018-03-23 RX ORDER — CIPROFLOXACIN 500 MG/1
500 TABLET, FILM COATED ORAL 2 TIMES DAILY
Status: ON HOLD | COMMUNITY
End: 2020-08-11

## 2018-03-23 RX ORDER — HYDROCODONE BITARTRATE AND ACETAMINOPHEN 10; 325 MG/1; MG/1
1 TABLET ORAL EVERY 4 HOURS PRN
COMMUNITY
End: 2020-08-17 | Stop reason: HOSPADM

## 2018-03-23 RX ORDER — ONDANSETRON 4 MG/1
4 TABLET, FILM COATED ORAL EVERY 8 HOURS PRN
Status: ON HOLD | COMMUNITY
End: 2020-08-11

## 2018-03-23 NOTE — ED NOTES
PATIENT REFUSES CHEST XRAY, STATES SHE FEELS FINE, STATES NOTHING IS WRONG WITH HER SHE DOES NOT KNOW WHY THEY SENT HER HERE FROM THE NURSING HOME.  DR SHELL AWARE.      Nori Michaud, ALVA  03/23/18 0521

## 2018-03-23 NOTE — DISCHARGE INSTRUCTIONS
Sharon refused all care today at our hospital. We were able to do an EKG which did not show any signs of active heart attack.

## 2018-03-23 NOTE — ED PROVIDER NOTES
"Subjective   88 y/o female with history of HTN, hypothyroidism, HLD, who [place] evaluation of chest pain. Per EMS patient had one episode of epigastric abdominal pain that was relieved by [facility] but [facility] requested transfer. The patient arrives confused, she states, when asked where she is \"well I know, I am not going to tell you, I have all my marbles.\" I do not have a baseline mentation for the patient and patient has refused all studies to this point. We are attempting to get ahold of [facility].     Patent states she is here for a \"check up\" stating \"I got what I wanted, can I go home now?\"            Review of Systems   Unable to perform ROS: Other       Past Medical History:   Diagnosis Date   • Hypertension        No Known Allergies    History reviewed. No pertinent surgical history.    History reviewed. No pertinent family history.    Social History     Social History   • Marital status:      Social History Main Topics   • Smoking status: Never Smoker   • Alcohol use No   • Drug use: Unknown     Other Topics Concern   • Not on file           Objective   Physical Exam   Constitutional: She appears well-developed and well-nourished.   HENT:   Head: Normocephalic.   Nose: Nose normal.   Eyes: Conjunctivae and EOM are normal. Pupils are equal, round, and reactive to light.   Neck: Normal range of motion. Neck supple.   Cardiovascular: Normal rate, regular rhythm, normal heart sounds and intact distal pulses.    Pulmonary/Chest: Effort normal and breath sounds normal.   Abdominal: Soft. Bowel sounds are normal.   Musculoskeletal: Normal range of motion.   Neurological: She is alert. She displays normal reflexes. No cranial nerve deficit. Coordination normal.   Skin: Skin is warm. Capillary refill takes less than 2 seconds.   Psychiatric: She has a normal mood and affect.   Vitals reviewed.      ECG 12 Lead    Date/Time: 3/23/2018 4:12 AM  Performed by: PING SHELL  Authorized by: " "BERNARDINO SHELL   Interpreted by physician  Rhythm: sinus bradycardia  Rate: bradycardic  BPM: 54  QRS axis: left  Conduction: incomplete LBBB               ED Course  ED Course        We have attempted to draw blood, to get xrays to no avail. We are waiting on call back from [facility].     Per [facility] patient is alert but confused and her mentation today is at her baseline. Given this, and her refusal for care will avoid further studies at this time rather than force patient to undergo studies. Her EKG shows no signs of acute ischemia and patient denies all symptoms at this time stating \"I don't want to be here any more.\"           [facility]    Final diagnoses:   Encounter for medical screening examination            Bernardino Shell MD  03/23/18 0556    "

## 2018-05-15 ENCOUNTER — HOSPITAL ENCOUNTER (EMERGENCY)
Facility: HOSPITAL | Age: 83
Discharge: HOME | End: 2018-05-15
Attending: EMERGENCY MEDICINE | Admitting: EMERGENCY MEDICINE

## 2018-05-15 ENCOUNTER — APPOINTMENT (OUTPATIENT)
Dept: CT IMAGING | Facility: HOSPITAL | Age: 83
End: 2018-05-15

## 2018-05-15 VITALS
WEIGHT: 84 LBS | HEIGHT: 61 IN | SYSTOLIC BLOOD PRESSURE: 168 MMHG | OXYGEN SATURATION: 100 % | TEMPERATURE: 99.1 F | DIASTOLIC BLOOD PRESSURE: 45 MMHG | HEART RATE: 65 BPM | RESPIRATION RATE: 16 BRPM | BODY MASS INDEX: 15.86 KG/M2

## 2018-05-15 DIAGNOSIS — S00.93XA CONTUSION OF HEAD, UNSPECIFIED PART OF HEAD, INITIAL ENCOUNTER: Primary | ICD-10-CM

## 2018-05-15 DIAGNOSIS — S01.81XA LACERATION OF FOREHEAD, INITIAL ENCOUNTER: ICD-10-CM

## 2018-05-15 PROCEDURE — 25010000002 TDAP 5-2.5-18.5 LF-MCG/0.5 SUSPENSION: Performed by: EMERGENCY MEDICINE

## 2018-05-15 PROCEDURE — 90715 TDAP VACCINE 7 YRS/> IM: CPT | Performed by: EMERGENCY MEDICINE

## 2018-05-15 PROCEDURE — 99284 EMERGENCY DEPT VISIT MOD MDM: CPT

## 2018-05-15 PROCEDURE — 90471 IMMUNIZATION ADMIN: CPT | Performed by: EMERGENCY MEDICINE

## 2018-05-15 PROCEDURE — 70450 CT HEAD/BRAIN W/O DYE: CPT

## 2018-05-15 RX ORDER — HYDROCODONE BITARTRATE AND ACETAMINOPHEN 10; 325 MG/1; MG/1
1 TABLET ORAL EVERY 4 HOURS PRN
Status: ON HOLD | COMMUNITY
End: 2020-08-10 | Stop reason: SDUPTHER

## 2018-05-15 RX ORDER — SERTRALINE HYDROCHLORIDE 25 MG/1
25 TABLET, FILM COATED ORAL DAILY
Status: ON HOLD | COMMUNITY
End: 2020-08-11

## 2018-05-15 RX ORDER — POLYETHYLENE GLYCOL 3350 17 G/17G
17 POWDER, FOR SOLUTION ORAL DAILY PRN
COMMUNITY
End: 2020-08-17 | Stop reason: HOSPADM

## 2018-05-15 RX ORDER — TIMOLOL MALEATE 5 MG/ML
1 SOLUTION/ DROPS OPHTHALMIC 2 TIMES DAILY
COMMUNITY
End: 2020-08-17 | Stop reason: HOSPADM

## 2018-05-15 RX ORDER — ACETAMINOPHEN 325 MG/1
650 TABLET ORAL EVERY 4 HOURS PRN
COMMUNITY

## 2018-05-15 RX ORDER — LIDOCAINE HYDROCHLORIDE 10 MG/ML
10 INJECTION, SOLUTION INFILTRATION; PERINEURAL ONCE
Status: COMPLETED | OUTPATIENT
Start: 2018-05-15 | End: 2018-05-15

## 2018-05-15 RX ORDER — BRINZOLAMIDE 10 MG/ML
1 SUSPENSION/ DROPS OPHTHALMIC 3 TIMES DAILY
COMMUNITY
End: 2020-08-17 | Stop reason: HOSPADM

## 2018-05-15 RX ADMIN — LIDOCAINE HYDROCHLORIDE 10 ML: 10 INJECTION, SOLUTION INFILTRATION; PERINEURAL at 14:56

## 2018-05-15 RX ADMIN — TETANUS TOXOID, REDUCED DIPHTHERIA TOXOID AND ACELLULAR PERTUSSIS VACCINE, ADSORBED 0.5 ML: 5; 2.5; 8; 8; 2.5 SUSPENSION INTRAMUSCULAR at 15:48

## 2018-05-15 NOTE — ED PROVIDER NOTES
Subjective   History of Present Illness    Review of Systems    Past Medical History:   Diagnosis Date   • Arthritis    • Disease of thyroid gland    • Encephalopathy    • Hyperlipidemia    • Hypertension        No Known Allergies    Past Surgical History:   Procedure Laterality Date   • HIP HEMIARTHROPLASTY Left 2/2/2018    Procedure: HIP HEMIARTHROPLASTY;  Surgeon: Emory Barger MD;  Location: [facility];  Service:    • TOTAL HIP ARTHROPLASTY Left 02/02/2018       History reviewed. No pertinent family history.    Social History     Social History   • Marital status:      Social History Main Topics   • Smoking status: Never Smoker   • Smokeless tobacco: Never Used   • Alcohol use No   • Drug use: No   • Sexual activity: Defer     Other Topics Concern   • Not on file           Objective   Physical Exam    Laceration Repair  Date/Time: 5/15/2018 3:04 PM  Performed by: DANNI BANSAL  Authorized by: GRACE WARD JR     Consent:     Consent obtained:  Verbal    Consent given by:  Patient    Risks discussed:  Infection, pain, need for additional repair and poor cosmetic result    Alternatives discussed:  No treatment  Anesthesia (see MAR for exact dosages):     Anesthesia method:  Local infiltration    Local anesthetic:  Lidocaine 1% w/o epi  Laceration details:     Location:  Face    Face location:  Forehead    Length (cm):  2    Depth (mm):  0.5  Repair type:     Repair type:  Simple  Pre-procedure details:     Preparation:  Patient was prepped and draped in usual sterile fashion  Exploration:     Wound extent: no foreign bodies/material noted, no nerve damage noted and no tendon damage noted      Contaminated: no    Treatment:     Area cleansed with:  Betadine    Amount of cleaning:  Standard    Irrigation solution:  Sterile saline    Irrigation method:  Syringe    Visualized foreign bodies/material removed: no    Skin repair:     Repair method:  Sutures    Suture size:  6-0    Suture material:  Nylon     Suture technique:  Simple interrupted    Number of sutures:  4  Approximation:     Approximation:  Close    Vermilion border: well-aligned    Post-procedure details:     Dressing:  Open (no dressing)    Patient tolerance of procedure:  Tolerated well, no immediate complications               ED Course  ED Course                  MDM      Final diagnoses:   None            Jourdan Guerrero PA-C  05/15/18 1501

## 2018-05-15 NOTE — ED PROVIDER NOTES
Subjective   Patient says she slipped on wet floor and fell and hit forehead.  No LOC and no pain elsewhere but has laceration of forehead.        History provided by:  Patient   used: No    Fall   Mechanism of injury: fall    Injury location:  Head/neck  Head/neck injury location:  Head  Incident location:  Home  Time since incident:  30 minutes  Arrived directly from scene: yes    Fall:     Fall occurred:  Standing    Impact surface:  Hard floor    Point of impact:  Unable to specify    Entrapped after fall: no    Protective equipment: none    Suspicion of alcohol use: no    Suspicion of drug use: no    Tetanus status:  Unknown  Prior to arrival data:     Bystander interventions:  None    Patient ambulatory at scene: yes      Blood loss:  Minimal    Responsiveness at scene:  Alert    Orientation at scene:  Person, place, situation and time    Loss of consciousness: no      Amnesic to event: no      Airway interventions:  None    Breathing interventions:  None    IV access status:  None    IO access:  None    Fluids administered:  None    Cardiac interventions:  None    Medications administered:  None    Immobilization:  None  Associated symptoms: no back pain, no blindness, no difficulty breathing, no hearing loss, no neck pain and no seizures    Risk factors: no anticoagulation therapy, no asthma, no beta blocker therapy, no COPD, no diabetes, no pacemaker, no past MI and not pregnant        Review of Systems   Constitutional: Negative.    HENT: Negative.  Negative for hearing loss.    Eyes: Negative for blindness.   Respiratory: Negative.    Cardiovascular: Negative.    Gastrointestinal: Negative.    Genitourinary: Negative.    Musculoskeletal: Negative.  Negative for back pain and neck pain.   Skin: Negative.    Neurological: Negative for seizures.   Hematological: Negative.    Psychiatric/Behavioral: Negative.    All other systems reviewed and are negative.      Past Medical History:    Diagnosis Date   • Arthritis    • Disease of thyroid gland    • Encephalopathy    • Hyperlipidemia    • Hypertension        No Known Allergies    Past Surgical History:   Procedure Laterality Date   • HIP HEMIARTHROPLASTY Left 2/2/2018    Procedure: HIP HEMIARTHROPLASTY;  Surgeon: Emory Barger MD;  Location: [facility];  Service:    • TOTAL HIP ARTHROPLASTY Left 02/02/2018       History reviewed. No pertinent family history.    Social History     Social History   • Marital status:      Social History Main Topics   • Smoking status: Never Smoker   • Smokeless tobacco: Never Used   • Alcohol use No   • Drug use: No   • Sexual activity: Defer     Other Topics Concern   • Not on file       Prior to Admission medications    Medication Sig Start Date End Date Taking? Authorizing Provider   atorvastatin (LIPITOR) 20 MG tablet Take 20 mg by mouth Daily.    Historical Provider, MD   bisoprolol-hydrochlorothiazide (ZIAC) 5-6.25 MG per tablet Take 1 tablet by mouth Daily.    Historical Provider, MD   brimonidine-timolol (COMBIGAN) 0.2-0.5 % ophthalmic solution Administer 1 drop to both eyes Every 12 (Twelve) Hours.    Historical Provider, MD   docusate sodium 100 MG capsule Take 100 mg by mouth 2 (Two) Times a Day. 2/6/18   Som Roach MD   dorzolamide (TRUSOPT) 2 % ophthalmic solution Administer 1 drop to both eyes 3 (Three) Times a Day.    Historical Provider, MD   ferrous sulfate 325 (65 FE) MG tablet Take 1 tablet by mouth 2 (Two) Times a Day With Meals. 2/6/18   Som Roach MD   levothyroxine (SYNTHROID, LEVOTHROID) 100 MCG tablet Take 100 mcg by mouth Daily.    Historical Provider, MD   ondansetron (ZOFRAN) 4 MG tablet Take 1 tablet by mouth Every 6 (Six) Hours As Needed for Nausea. 2/6/18   Som Roach MD   tobramycin 0.3 % solution ophthalmic solution Administer 1 drop into the left eye 3 (Three) Times a Day.    Historical Provider, MD       Medications   Tdap (BOOSTRIX) injection  0.5 mL (not administered)   lidocaine (XYLOCAINE) 1 % injection 10 mL (not administered)       Vitals:    05/15/18 1408   BP:    Pulse:    Resp:    Temp: 99.1 °F (37.3 °C)   SpO2:          Objective   Physical Exam   Constitutional: She is oriented to person, place, and time. She appears well-developed and well-nourished.   HENT:   Head: Normocephalic.   Neck: Normal range of motion. Neck supple.   Cardiovascular: Normal rate and regular rhythm.    Pulmonary/Chest: Effort normal and breath sounds normal.   Musculoskeletal:   3 cm laceration on left forehead above eyebrow, no bony deformity noted.   Neurological: She is alert and oriented to person, place, and time.   Skin: Skin is warm and dry.   Psychiatric: She has a normal mood and affect. Her behavior is normal.   Nursing note and vitals reviewed.      Procedures         Lab Results (last 24 hours)     ** No results found for the last 24 hours. **          CT Head Without Contrast   Final Result   1. No hemorrhage, edema or mass effect. No acute findings.   2. Mild to moderate atrophy.   3. Sphenoid sinusitis, stable compared to the previous study.       The full report of this exam was immediately signed and available to the   emergency room. The patient is currently in the emergency room.       This report was finalized on 05/15/2018 14:44 by Dr. Tavo Tejeda MD.          ED Course  ED Course   Comment By Time   CT is negative and wound is sutured. Karlos Toribio Jr., MD 05/15 0633          MDM  Number of Diagnoses or Management Options  Contusion of head, unspecified part of head, initial encounter: new and requires workup  Laceration of forehead, initial encounter: new and requires workup     Amount and/or Complexity of Data Reviewed  Tests in the radiology section of CPT®: ordered and reviewed    Risk of Complications, Morbidity, and/or Mortality  Presenting problems: moderate  Diagnostic procedures: moderate  Management options: moderate    Patient  Progress  Patient progress: stable      Final diagnoses:   Contusion of head, unspecified part of head, initial encounter   Laceration of forehead, initial encounter          Karlos Toribio Jr., MD  05/15/18 2423

## 2018-09-24 ENCOUNTER — LAB REQUISITION (OUTPATIENT)
Dept: LAB | Facility: HOSPITAL | Age: 83
End: 2018-09-24

## 2018-09-24 DIAGNOSIS — Z00.00 ROUTINE GENERAL MEDICAL EXAMINATION AT A HEALTH CARE FACILITY: ICD-10-CM

## 2018-09-27 NOTE — PLAN OF CARE
Problem: Fall Risk (Adult)  Goal: Identify Related Risk Factors and Signs and Symptoms  Outcome: Outcome(s) achieved Date Met: 02/02/18    Goal: Absence of Falls  Outcome: Ongoing (interventions implemented as appropriate)      Problem: Pressure Ulcer Risk (To Scale) (Adult,Obstetrics,Pediatric)  Goal: Identify Related Risk Factors and Signs and Symptoms  Outcome: Outcome(s) achieved Date Met: 02/02/18    Goal: Skin Integrity  Outcome: Ongoing (interventions implemented as appropriate)      Problem: Patient Care Overview (Adult)  Goal: Plan of Care Review  Outcome: Ongoing (interventions implemented as appropriate)   02/02/18 0509   Coping/Psychosocial Response Interventions   Plan Of Care Reviewed With patient   Patient Care Overview   Progress no change   Outcome Evaluation   Outcome Summary/Follow up Plan Confused. No c/o of pain, wants to get up and go to the hospital. Pt. NPO Surgery today. bed check on. safety maintained.        Problem: Fractured Hip (Adult)  Goal: Signs and Symptoms of Listed Potential Problems Will be Absent or Manageable (Fractured Hip)  Outcome: Ongoing (interventions implemented as appropriate)         stated

## 2019-02-18 ENCOUNTER — LAB REQUISITION (OUTPATIENT)
Dept: LAB | Facility: HOSPITAL | Age: 84
End: 2019-02-18

## 2019-02-18 DIAGNOSIS — Z00.00 ROUTINE GENERAL MEDICAL EXAMINATION AT A HEALTH CARE FACILITY: ICD-10-CM

## 2019-02-18 LAB
ALBUMIN SERPL-MCNC: 3 G/DL (ref 3.5–5)
ANION GAP SERPL CALCULATED.3IONS-SCNC: 5 MMOL/L (ref 4–13)
BASOPHILS # BLD AUTO: 0.03 10*3/MM3 (ref 0–0.2)
BASOPHILS NFR BLD AUTO: 0.5 % (ref 0–2)
BUN BLD-MCNC: 24 MG/DL (ref 5–21)
BUN/CREAT SERPL: 23.5 (ref 7–25)
CALCIUM SPEC-SCNC: 8.2 MG/DL (ref 8.4–10.4)
CHLORIDE SERPL-SCNC: 102 MMOL/L (ref 98–110)
CO2 SERPL-SCNC: 30 MMOL/L (ref 24–31)
CREAT BLD-MCNC: 1.02 MG/DL (ref 0.5–1.4)
DEPRECATED RDW RBC AUTO: 48.6 FL (ref 40–54)
EOSINOPHIL # BLD AUTO: 0.33 10*3/MM3 (ref 0–0.7)
EOSINOPHIL NFR BLD AUTO: 6 % (ref 0–4)
ERYTHROCYTE [DISTWIDTH] IN BLOOD BY AUTOMATED COUNT: 13.4 % (ref 12–15)
GFR SERPL CREATININE-BSD FRML MDRD: 51 ML/MIN/1.73
GFR SERPL CREATININE-BSD FRML MDRD: 62 ML/MIN/1.73
GLUCOSE BLD-MCNC: 87 MG/DL (ref 70–100)
HCT VFR BLD AUTO: 31.8 % (ref 37–47)
HGB BLD-MCNC: 10 G/DL (ref 12–16)
IMM GRANULOCYTES # BLD AUTO: 0.01 10*3/MM3 (ref 0–0.05)
IMM GRANULOCYTES NFR BLD AUTO: 0.2 % (ref 0–5)
LYMPHOCYTES # BLD AUTO: 1.79 10*3/MM3 (ref 0.72–4.86)
LYMPHOCYTES NFR BLD AUTO: 32.5 % (ref 15–45)
MCH RBC QN AUTO: 31.1 PG (ref 28–32)
MCHC RBC AUTO-ENTMCNC: 31.4 G/DL (ref 33–36)
MCV RBC AUTO: 98.8 FL (ref 82–98)
MONOCYTES # BLD AUTO: 0.66 10*3/MM3 (ref 0.19–1.3)
MONOCYTES NFR BLD AUTO: 12 % (ref 4–12)
NEUTROPHILS # BLD AUTO: 2.68 10*3/MM3 (ref 1.87–8.4)
NEUTROPHILS NFR BLD AUTO: 48.8 % (ref 39–78)
NRBC BLD AUTO-RTO: 0 /100 WBC (ref 0–0)
PLATELET # BLD AUTO: 345 10*3/MM3 (ref 130–400)
PMV BLD AUTO: 9.6 FL (ref 6–12)
POTASSIUM BLD-SCNC: 4.5 MMOL/L (ref 3.5–5.3)
RBC # BLD AUTO: 3.22 10*6/MM3 (ref 4.2–5.4)
SODIUM BLD-SCNC: 137 MMOL/L (ref 135–145)
T4 FREE SERPL-MCNC: 1.08 NG/DL (ref 0.78–2.19)
TSH SERPL DL<=0.05 MIU/L-ACNC: 19.5 MIU/ML (ref 0.47–4.68)
WBC NRBC COR # BLD: 5.5 10*3/MM3 (ref 4.8–10.8)

## 2019-02-18 PROCEDURE — 82040 ASSAY OF SERUM ALBUMIN: CPT

## 2019-02-18 PROCEDURE — 80048 BASIC METABOLIC PNL TOTAL CA: CPT

## 2019-02-18 PROCEDURE — 84439 ASSAY OF FREE THYROXINE: CPT

## 2019-02-18 PROCEDURE — 84443 ASSAY THYROID STIM HORMONE: CPT

## 2019-02-18 PROCEDURE — 85025 COMPLETE CBC W/AUTO DIFF WBC: CPT

## 2020-08-10 ENCOUNTER — APPOINTMENT (OUTPATIENT)
Dept: GENERAL RADIOLOGY | Facility: HOSPITAL | Age: 85
End: 2020-08-10

## 2020-08-10 ENCOUNTER — HOSPITAL ENCOUNTER (INPATIENT)
Facility: HOSPITAL | Age: 85
LOS: 3 days | Discharge: NURSING, REHAB OR LONG-TERM CARE | End: 2020-08-17
Attending: INTERNAL MEDICINE | Admitting: FAMILY MEDICINE

## 2020-08-10 ENCOUNTER — LAB REQUISITION (OUTPATIENT)
Dept: LAB | Facility: HOSPITAL | Age: 85
End: 2020-08-10

## 2020-08-10 DIAGNOSIS — Z00.00 ROUTINE GENERAL MEDICAL EXAMINATION AT A HEALTH CARE FACILITY: ICD-10-CM

## 2020-08-10 DIAGNOSIS — N39.0 ACUTE UTI: ICD-10-CM

## 2020-08-10 DIAGNOSIS — Z51.5 COMFORT MEASURES ONLY STATUS: ICD-10-CM

## 2020-08-10 DIAGNOSIS — N20.0 STAGHORN CALCULUS: ICD-10-CM

## 2020-08-10 DIAGNOSIS — D72.829 LEUKOCYTOSIS, UNSPECIFIED TYPE: ICD-10-CM

## 2020-08-10 DIAGNOSIS — D64.9 ANEMIA, UNSPECIFIED TYPE: ICD-10-CM

## 2020-08-10 DIAGNOSIS — N17.9 ACUTE KIDNEY INJURY (HCC): Primary | ICD-10-CM

## 2020-08-10 LAB
ALBUMIN SERPL-MCNC: 2.1 G/DL (ref 3.5–5.2)
ALBUMIN/GLOB SERPL: 0.5 G/DL
ALP SERPL-CCNC: 122 U/L (ref 39–117)
ALT SERPL W P-5'-P-CCNC: 29 U/L (ref 1–33)
ANION GAP SERPL CALCULATED.3IONS-SCNC: 10 MMOL/L (ref 5–15)
ANION GAP SERPL CALCULATED.3IONS-SCNC: 11 MMOL/L (ref 5–15)
AST SERPL-CCNC: 38 U/L (ref 1–32)
BACTERIA UR QL AUTO: ABNORMAL /HPF
BASOPHILS # BLD AUTO: 0.04 10*3/MM3 (ref 0–0.2)
BASOPHILS NFR BLD AUTO: 0.2 % (ref 0–1.5)
BILIRUB SERPL-MCNC: 0.3 MG/DL (ref 0–1.2)
BILIRUB UR QL STRIP: NEGATIVE
BUN SERPL-MCNC: 27 MG/DL (ref 8–23)
BUN SERPL-MCNC: 28 MG/DL (ref 8–23)
BUN/CREAT SERPL: 12 (ref 7–25)
BUN/CREAT SERPL: 12.2 (ref 7–25)
CALCIUM SPEC-SCNC: 8 MG/DL (ref 8.2–9.6)
CALCIUM SPEC-SCNC: 8.4 MG/DL (ref 8.2–9.6)
CHLORIDE SERPL-SCNC: 107 MMOL/L (ref 98–107)
CHLORIDE SERPL-SCNC: 108 MMOL/L (ref 98–107)
CK SERPL-CCNC: 37 U/L (ref 20–180)
CLARITY UR: ABNORMAL
CO2 SERPL-SCNC: 17 MMOL/L (ref 22–29)
CO2 SERPL-SCNC: 18 MMOL/L (ref 22–29)
COLOR UR: YELLOW
CREAT SERPL-MCNC: 2.21 MG/DL (ref 0.57–1)
CREAT SERPL-MCNC: 2.34 MG/DL (ref 0.57–1)
DEPRECATED RDW RBC AUTO: 48.3 FL (ref 37–54)
EOSINOPHIL # BLD AUTO: 0.15 10*3/MM3 (ref 0–0.4)
EOSINOPHIL NFR BLD AUTO: 0.8 % (ref 0.3–6.2)
ERYTHROCYTE [DISTWIDTH] IN BLOOD BY AUTOMATED COUNT: 14.3 % (ref 12.3–15.4)
GFR SERPL CREATININE-BSD FRML MDRD: 19 ML/MIN/1.73
GFR SERPL CREATININE-BSD FRML MDRD: 21 ML/MIN/1.73
GFR SERPL CREATININE-BSD FRML MDRD: 24 ML/MIN/1.73
GFR SERPL CREATININE-BSD FRML MDRD: 25 ML/MIN/1.73
GLOBULIN UR ELPH-MCNC: 4.2 GM/DL
GLUCOSE SERPL-MCNC: 102 MG/DL (ref 65–99)
GLUCOSE SERPL-MCNC: 97 MG/DL (ref 65–99)
GLUCOSE UR STRIP-MCNC: NEGATIVE MG/DL
HCT VFR BLD AUTO: 28 % (ref 34–46.6)
HGB BLD-MCNC: 9 G/DL (ref 12–15.9)
HGB UR QL STRIP.AUTO: ABNORMAL
HYALINE CASTS UR QL AUTO: ABNORMAL /LPF
IMM GRANULOCYTES # BLD AUTO: 0.1 10*3/MM3 (ref 0–0.05)
IMM GRANULOCYTES NFR BLD AUTO: 0.5 % (ref 0–0.5)
KETONES UR QL STRIP: NEGATIVE
LEUKOCYTE ESTERASE UR QL STRIP.AUTO: ABNORMAL
LYMPHOCYTES # BLD AUTO: 3.04 10*3/MM3 (ref 0.7–3.1)
LYMPHOCYTES NFR BLD AUTO: 16.2 % (ref 19.6–45.3)
MCH RBC QN AUTO: 30.1 PG (ref 26.6–33)
MCHC RBC AUTO-ENTMCNC: 32.1 G/DL (ref 31.5–35.7)
MCV RBC AUTO: 93.6 FL (ref 79–97)
MONOCYTES # BLD AUTO: 0.84 10*3/MM3 (ref 0.1–0.9)
MONOCYTES NFR BLD AUTO: 4.5 % (ref 5–12)
NEUTROPHILS NFR BLD AUTO: 14.56 10*3/MM3 (ref 1.7–7)
NEUTROPHILS NFR BLD AUTO: 77.8 % (ref 42.7–76)
NITRITE UR QL STRIP: NEGATIVE
NRBC BLD AUTO-RTO: 0 /100 WBC (ref 0–0.2)
PH UR STRIP.AUTO: 5.5 [PH] (ref 5–8)
PLATELET # BLD AUTO: 373 10*3/MM3 (ref 140–450)
PMV BLD AUTO: 9.6 FL (ref 6–12)
POTASSIUM SERPL-SCNC: 4.2 MMOL/L (ref 3.5–5.2)
POTASSIUM SERPL-SCNC: 4.9 MMOL/L (ref 3.5–5.2)
POTASSIUM SERPL-SCNC: 6 MMOL/L (ref 3.5–5.2)
PROT SERPL-MCNC: 6.3 G/DL (ref 6–8.5)
PROT UR QL STRIP: ABNORMAL
RBC # BLD AUTO: 2.99 10*6/MM3 (ref 3.77–5.28)
RBC # UR: ABNORMAL /HPF
REF LAB TEST METHOD: ABNORMAL
SARS-COV-2 RNA RESP QL NAA+PROBE: NOT DETECTED
SODIUM SERPL-SCNC: 135 MMOL/L (ref 136–145)
SODIUM SERPL-SCNC: 136 MMOL/L (ref 136–145)
SP GR UR STRIP: 1.02 (ref 1–1.03)
SQUAMOUS #/AREA URNS HPF: ABNORMAL /HPF
TROPONIN T SERPL-MCNC: 0.07 NG/ML (ref 0–0.03)
UROBILINOGEN UR QL STRIP: ABNORMAL
WBC # BLD AUTO: 18.73 10*3/MM3 (ref 3.4–10.8)
WBC UR QL AUTO: ABNORMAL /HPF

## 2020-08-10 PROCEDURE — 87186 SC STD MICRODIL/AGAR DIL: CPT | Performed by: PHYSICIAN ASSISTANT

## 2020-08-10 PROCEDURE — 84100 ASSAY OF PHOSPHORUS: CPT | Performed by: INTERNAL MEDICINE

## 2020-08-10 PROCEDURE — G0378 HOSPITAL OBSERVATION PER HR: HCPCS

## 2020-08-10 PROCEDURE — 84132 ASSAY OF SERUM POTASSIUM: CPT | Performed by: PHYSICIAN ASSISTANT

## 2020-08-10 PROCEDURE — P9612 CATHETERIZE FOR URINE SPEC: HCPCS

## 2020-08-10 PROCEDURE — 81001 URINALYSIS AUTO W/SCOPE: CPT | Performed by: PHYSICIAN ASSISTANT

## 2020-08-10 PROCEDURE — 80053 COMPREHEN METABOLIC PANEL: CPT | Performed by: PHYSICIAN ASSISTANT

## 2020-08-10 PROCEDURE — 87086 URINE CULTURE/COLONY COUNT: CPT | Performed by: PHYSICIAN ASSISTANT

## 2020-08-10 PROCEDURE — 85025 COMPLETE CBC W/AUTO DIFF WBC: CPT | Performed by: PHYSICIAN ASSISTANT

## 2020-08-10 PROCEDURE — 99285 EMERGENCY DEPT VISIT HI MDM: CPT

## 2020-08-10 PROCEDURE — 87635 SARS-COV-2 COVID-19 AMP PRB: CPT | Performed by: PHYSICIAN ASSISTANT

## 2020-08-10 PROCEDURE — 71045 X-RAY EXAM CHEST 1 VIEW: CPT

## 2020-08-10 PROCEDURE — 80048 BASIC METABOLIC PNL TOTAL CA: CPT

## 2020-08-10 PROCEDURE — 84443 ASSAY THYROID STIM HORMONE: CPT | Performed by: INTERNAL MEDICINE

## 2020-08-10 PROCEDURE — 83735 ASSAY OF MAGNESIUM: CPT | Performed by: INTERNAL MEDICINE

## 2020-08-10 PROCEDURE — 93005 ELECTROCARDIOGRAM TRACING: CPT | Performed by: PHYSICIAN ASSISTANT

## 2020-08-10 PROCEDURE — 25010000002 CEFTRIAXONE PER 250 MG: Performed by: PHYSICIAN ASSISTANT

## 2020-08-10 PROCEDURE — 25010000002 HALOPERIDOL LACTATE PER 5 MG: Performed by: PHYSICIAN ASSISTANT

## 2020-08-10 PROCEDURE — 82550 ASSAY OF CK (CPK): CPT | Performed by: PHYSICIAN ASSISTANT

## 2020-08-10 PROCEDURE — 93010 ELECTROCARDIOGRAM REPORT: CPT | Performed by: INTERNAL MEDICINE

## 2020-08-10 PROCEDURE — 87077 CULTURE AEROBIC IDENTIFY: CPT | Performed by: PHYSICIAN ASSISTANT

## 2020-08-10 PROCEDURE — 84484 ASSAY OF TROPONIN QUANT: CPT | Performed by: PHYSICIAN ASSISTANT

## 2020-08-10 RX ORDER — ESCITALOPRAM OXALATE 10 MG/1
10 TABLET ORAL DAILY
COMMUNITY
End: 2020-08-17 | Stop reason: HOSPADM

## 2020-08-10 RX ORDER — MULTIVITAMIN
1 TABLET ORAL DAILY
COMMUNITY
End: 2020-08-17 | Stop reason: HOSPADM

## 2020-08-10 RX ORDER — BRIMONIDINE TARTRATE 2 MG/ML
1 SOLUTION/ DROPS OPHTHALMIC 2 TIMES DAILY
COMMUNITY
End: 2020-08-17 | Stop reason: HOSPADM

## 2020-08-10 RX ORDER — DOCUSATE SODIUM 100 MG/1
100 CAPSULE, LIQUID FILLED ORAL DAILY
COMMUNITY
End: 2020-08-17 | Stop reason: HOSPADM

## 2020-08-10 RX ORDER — ASPIRIN 81 MG/1
81 TABLET, CHEWABLE ORAL DAILY
Status: ON HOLD | COMMUNITY
End: 2020-08-11

## 2020-08-10 RX ORDER — BISACODYL 5 MG/1
5 TABLET, DELAYED RELEASE ORAL DAILY PRN
COMMUNITY
End: 2020-08-17 | Stop reason: HOSPADM

## 2020-08-10 RX ORDER — MEMANTINE HYDROCHLORIDE 5 MG/1
5 TABLET ORAL 2 TIMES DAILY
COMMUNITY
End: 2020-08-17 | Stop reason: HOSPADM

## 2020-08-10 RX ORDER — HALOPERIDOL 5 MG/ML
10 INJECTION INTRAMUSCULAR ONCE
Status: COMPLETED | OUTPATIENT
Start: 2020-08-10 | End: 2020-08-10

## 2020-08-10 RX ORDER — SODIUM CHLORIDE 9 MG/ML
100 INJECTION, SOLUTION INTRAVENOUS CONTINUOUS
Status: DISCONTINUED | OUTPATIENT
Start: 2020-08-10 | End: 2020-08-11

## 2020-08-10 RX ORDER — DONEPEZIL HYDROCHLORIDE 10 MG/1
10 TABLET, FILM COATED ORAL NIGHTLY
COMMUNITY
End: 2020-08-17 | Stop reason: HOSPADM

## 2020-08-10 RX ADMIN — CEFTRIAXONE SODIUM 1 G: 1 INJECTION, POWDER, FOR SOLUTION INTRAMUSCULAR; INTRAVENOUS at 21:15

## 2020-08-10 RX ADMIN — HALOPERIDOL LACTATE 10 MG: 5 INJECTION, SOLUTION INTRAMUSCULAR at 19:43

## 2020-08-10 RX ADMIN — SODIUM CHLORIDE 100 ML/HR: 9 INJECTION, SOLUTION INTRAVENOUS at 18:29

## 2020-08-10 NOTE — ED PROVIDER NOTES
Subjective   History of Present Illness      Patient is a pleasant 89-year-old female who presents the ED by EMS due to hyperkalemia and acute kidney injury.  Per nursing home documentation, the patient had laboratory data completed on July 30, 2020.  She had evidence of acute kidney injury and hypokalemia at that time.  Her creatinine at that time was 2.1 and potassium is 2.8.  Previous labs with the blood work given to me by the nursing home shows that her previous creatinine is 0.9 and potassium was 3.9.  She also had a white blood cell count of 15,000 with hemoglobin 8.3 hematocrit 25.8.  Her H&H on March 14, 2020 was 11.0 and 33.3 respectively.  She was started on potassium 20 mEq twice daily on July 30, 2020.  She does have evidence of acute UTI as well with a white blood cell count is too numerous to count, large leukocyte esterase, positive nitrates, 5-15 red blood cells and rare bacteria.  She was started on Macrobid as well.    Her repeat laboratory data completed on August 6, 2020 shows that her creatinine was 1.9, BUN 26, potassium 5.2.  Her potassium was decreased to 10 mEq daily.  Today on August 10, 2020, the patient's repeat laboratory data shows that the patient's BUN is 28 and her creatinine is 2.34.  Potassium 6.0.  Her potassium was DC'd altogether and she was given Kayexalate 20 mL x 1 prior to ER arrival. Pt does not have documentation of previous renal failure.  She does have a full code status. Pt is demented and she is at her neurological baseline.     Review of Systems   Unable to perform ROS: Dementia       Past Medical History:   Diagnosis Date   • Arthritis    • Disease of thyroid gland    • Encephalopathy    • Hyperlipidemia    • Hypertension        No Known Allergies    Past Surgical History:   Procedure Laterality Date   • HIP HEMIARTHROPLASTY Left 2/2/2018    Procedure: HIP HEMIARTHROPLASTY;  Surgeon: Emory Barger MD;  Location: [facility];  Service:    • TOTAL HIP ARTHROPLASTY Left  02/02/2018       No family history on file.    Social History     Socioeconomic History   • Marital status:      Spouse name: Not on file   • Number of children: Not on file   • Years of education: Not on file   • Highest education level: Not on file   Tobacco Use   • Smoking status: Never Smoker   • Smokeless tobacco: Never Used   Substance and Sexual Activity   • Alcohol use: No   • Drug use: No   • Sexual activity: Defer   Social History Narrative    ** Merged History Encounter **            Prior to Admission medications    Medication Sig Start Date End Date Taking? Authorizing Provider   acetaminophen (TYLENOL) 325 MG tablet Take 650 mg by mouth Every 6 (Six) Hours As Needed for Mild Pain .    King Lewis MD   acetaminophen (TYLENOL) 325 MG tablet Take 650 mg by mouth Every 6 (Six) Hours As Needed for Mild Pain .    King Lewis MD   atorvastatin (LIPITOR) 20 MG tablet Take 20 mg by mouth Daily.    King Lewis MD   atorvastatin (LIPITOR) 20 MG tablet Take 20 mg by mouth Daily.    King Lewis MD   bisoprolol-hydrochlorothiazide (ZIAC) 5-6.25 MG per tablet Take 1 tablet by mouth Daily.    King Lewis MD   brimonidine (ALPHAGAN) 0.2 % ophthalmic solution 1 drop 3 (Three) Times a Day.    King Lewis MD   brimonidine-timolol (COMBIGAN) 0.2-0.5 % ophthalmic solution Administer 1 drop to both eyes Every 12 (Twelve) Hours.    King Lewis MD   brinzolamide (AZOPT) 1 % ophthalmic suspension Administer 1 drop to both eyes 3 (Three) Times a Day.    King Lewis MD   ciprofloxacin (CIPRO) 500 MG tablet Take 500 mg by mouth 2 (Two) Times a Day.    King Lewis MD   docusate sodium (COLACE) 100 MG capsule Take 100 mg by mouth 2 (Two) Times a Day.    King Lewis MD   docusate sodium 100 MG capsule Take 100 mg by mouth 2 (Two) Times a Day. 2/6/18   Som Roach MD   dorzolamide (TRUSOPT) 2 % ophthalmic solution  Administer 1 drop to both eyes 3 (Three) Times a Day.    King Lewis MD   ferrous sulfate 325 (65 FE) MG tablet Take 1 tablet by mouth 2 (Two) Times a Day With Meals. 2/6/18   Som Roach MD   ferrous sulfate 325 (65 FE) MG tablet Take 325 mg by mouth Daily With Breakfast.    King Lewis MD   HYDROcodone-acetaminophen (NORCO)  MG per tablet Take 1 tablet by mouth Every 4 (Four) Hours As Needed for Moderate Pain .    King Lewis MD   HYDROcodone-acetaminophen (NORCO)  MG per tablet Take 1 tablet by mouth Every 4 (Four) Hours As Needed for Moderate Pain .    King Lewis MD   levothyroxine (SYNTHROID, LEVOTHROID) 100 MCG tablet Take 100 mcg by mouth Daily.    King Lewis MD   levothyroxine (SYNTHROID, LEVOTHROID) 100 MCG tablet Take 100 mcg by mouth Daily.    King Lewis MD   ondansetron (ZOFRAN) 4 MG tablet Take 1 tablet by mouth Every 6 (Six) Hours As Needed for Nausea. 2/6/18   Som Roach MD   ondansetron (ZOFRAN) 4 MG tablet Take 4 mg by mouth Every 8 (Eight) Hours As Needed for Nausea or Vomiting.    King Lewis MD   polyethylene glycol (MIRALAX) packet Take 17 g by mouth Daily.    King Lewis MD   polyethylene glycol (MIRALAX) packet Take 17 g by mouth Daily.    King Lewis MD   sertraline (ZOLOFT) 25 MG tablet Take 25 mg by mouth Daily.    King Lewis MD   sertraline (ZOLOFT) 25 MG tablet Take 25 mg by mouth Daily.    King Lewis MD   timolol (TIMOPTIC) 0.5 % ophthalmic solution Administer 1 drop to both eyes 2 (Two) Times a Day.    King Lewis MD   timolol (TIMOPTIC) 0.5 % ophthalmic solution 1 drop 2 (Two) Times a Day.    King Lewis MD       Medications   sodium chloride 0.9 % infusion (100 mL/hr Intravenous Currently Infusing 8/10/20 2120)   cefTRIAXone (ROCEPHIN) 1 g/100 mL 0.9% NS (MBP) (1 g Intravenous New Bag 8/10/20 2115)   haloperidol  "lactate (HALDOL) injection 10 mg (10 mg Intramuscular Given 8/10/20 1943)       /40   Pulse 59   Temp 98.3 °F (36.8 °C) (Oral)   Resp 18   Ht 160 cm (63\")   Wt 36.3 kg (80 lb)   SpO2 99%   BMI 14.17 kg/m²       Objective   Physical Exam   Constitutional: She appears well-developed and well-nourished. She appears cachectic. She is cooperative.   Chronically ill-appearing   Eyes: EOM are normal. Right eye exhibits discharge. Left eye exhibits discharge.   Neck: Normal range of motion. Neck supple.   Cardiovascular: Normal rate and regular rhythm.   Pulmonary/Chest: Effort normal and breath sounds normal.   Abdominal: Soft. Bowel sounds are normal.   Neurological: She is alert. She is disoriented. She displays atrophy.   Patient is making jokes with us.  She is disoriented.  Follows commands.  Generally weak.  Strength is symmetrical.   Skin: Skin is warm and dry. Capillary refill takes less than 2 seconds. There is pallor.   Psychiatric: She has a normal mood and affect. Her behavior is normal.       Procedures         Lab Results (last 24 hours)     Procedure Component Value Units Date/Time    COVID PRE-OP / PRE-PROCEDURE SCREENING ORDER (NO ISOLATION) - Swab, Nasopharynx [[id number]] Collected:  08/10/20 1734    Specimen:  Swab from Nasopharynx Updated:  08/10/20 1847    Narrative:       The following orders were created for panel order COVID PRE-OP / PRE-PROCEDURE SCREENING ORDER (NO ISOLATION) - Swab, Nasopharynx.  Procedure                               Abnormality         Status                     ---------                               -----------         ------                     COVID-19,CEPHEID,COR/KASSIDY...[[id number]]  Normal              Final result                 Please view results for these tests on the individual orders.    COVID-19,CEPHEID,COR/KASSIDY/PAD IN-HOUSE(OR EMERGENT/ADD-ON),NP SWAB IN TRANSPORT MEDIA 3-4 HR TAT - Swab, Nasopharynx [[id number]]  (Normal) Collected:  08/10/20 1734    " Specimen:  Swab from Nasopharynx Updated:  08/10/20 1847     COVID19 Not Detected    Narrative:       Fact sheet for providers: https://www.fda.gov/media/162500/download     Fact sheet for patients: https://www.fda.gov/media/381604/download    CBC & Differential [[id number]] Collected:  08/10/20 1753    Specimen:  Blood Updated:  08/10/20 1813    Narrative:       The following orders were created for panel order CBC & Differential.  Procedure                               Abnormality         Status                     ---------                               -----------         ------                     CBC Auto Differential[[id number]]        Abnormal            Final result                 Please view results for these tests on the individual orders.    Comprehensive Metabolic Panel [[id number]]  (Abnormal) Collected:  08/10/20 1753    Specimen:  Blood Updated:  08/10/20 1842     Glucose 97 mg/dL      BUN 27 mg/dL      Creatinine 2.21 mg/dL      Sodium 135 mmol/L      Potassium 4.9 mmol/L      Comment: Specimen hemolyzed.  Results may be affected.        Chloride 108 mmol/L      CO2 17.0 mmol/L      Calcium 8.0 mg/dL      Total Protein 6.3 g/dL      Albumin 2.10 g/dL      ALT (SGPT) 29 U/L      AST (SGOT) 38 U/L      Comment: Specimen hemolyzed.  Results may be affected.        Alkaline Phosphatase 122 U/L      Total Bilirubin 0.3 mg/dL      eGFR Non African Amer 21 mL/min/1.73      eGFR  African Amer 25 mL/min/1.73      Globulin 4.2 gm/dL      A/G Ratio 0.5 g/dL      BUN/Creatinine Ratio 12.2     Anion Gap 10.0 mmol/L     Narrative:       GFR Normal >60  Chronic Kidney Disease <60  Kidney Failure <15      Troponin [[id number]]  (Abnormal) Collected:  08/10/20 1753    Specimen:  Blood Updated:  08/10/20 1841     Troponin T 0.068 ng/mL     Narrative:       Troponin T Reference Range:  <= 0.03 ng/mL-   Negative for AMI  >0.03 ng/mL-     Abnormal for myocardial necrosis.  Clinicians would have to utilize clinical acumen,  EKG, Troponin and serial changes to determine if it is an Acute Myocardial Infarction or myocardial injury due to an underlying chronic condition.       Results may be falsely decreased if patient taking Biotin.      CK [[id number]]  (Normal) Collected:  08/10/20 1753    Specimen:  Blood Updated:  08/10/20 1837     Creatine Kinase 37 U/L     CBC Auto Differential [[id number]]  (Abnormal) Collected:  08/10/20 1753    Specimen:  Blood Updated:  08/10/20 1813     WBC 18.73 10*3/mm3      RBC 2.99 10*6/mm3      Hemoglobin 9.0 g/dL      Hematocrit 28.0 %      MCV 93.6 fL      MCH 30.1 pg      MCHC 32.1 g/dL      RDW 14.3 %      RDW-SD 48.3 fl      MPV 9.6 fL      Platelets 373 10*3/mm3      Neutrophil % 77.8 %      Lymphocyte % 16.2 %      Monocyte % 4.5 %      Eosinophil % 0.8 %      Basophil % 0.2 %      Immature Grans % 0.5 %      Neutrophils, Absolute 14.56 10*3/mm3      Lymphocytes, Absolute 3.04 10*3/mm3      Monocytes, Absolute 0.84 10*3/mm3      Eosinophils, Absolute 0.15 10*3/mm3      Basophils, Absolute 0.04 10*3/mm3      Immature Grans, Absolute 0.10 10*3/mm3      nRBC 0.0 /100 WBC     Urinalysis With Culture If Indicated - Urine, Catheter [[id number]]  (Abnormal) Collected:  08/10/20 2007    Specimen:  Urine, Catheter Updated:  08/10/20 2026     Color, UA Yellow     Appearance, UA Cloudy     pH, UA 5.5     Specific Gravity, UA 1.025     Glucose, UA Negative     Ketones, UA Negative     Bilirubin, UA Negative     Blood, UA Moderate (2+)     Protein, UA 30 mg/dL (1+)     Leuk Esterase, UA Moderate (2+)     Nitrite, UA Negative     Urobilinogen, UA 0.2 E.U./dL    Urinalysis, Microscopic Only - Urine, Catheter [[id number]]  (Abnormal) Collected:  08/10/20 2007    Specimen:  Urine, Catheter Updated:  08/10/20 2026     RBC, UA 6-12 /HPF      WBC, UA Too Numerous to Count /HPF      Bacteria, UA 4+ /HPF      Squamous Epithelial Cells, UA None Seen /HPF      Hyaline Casts, UA 3-6 /LPF      Methodology Automated Microscopy     Urine Culture - Urine, Urine, Catheter [[id number]] Collected:  08/10/20 2007    Specimen:  Urine, Catheter Updated:  08/10/20 2026    Potassium [[id number]]  (Normal) Collected:  08/10/20 2010    Specimen:  Blood Updated:  08/10/20 2027     Potassium 4.2 mmol/L           Xr Chest 1 View    Result Date: 8/10/2020  Narrative: EXAMINATION: XR CHEST 1 VW-  8/10/2020 8:17 PM CDT  HISTORY: hyperkalemia  1 view chest x-ray compared with 2/1/2018.  Heart size is normal. The mediastinum is within normal limits.  The lungs are mildly hyperexpanded with no pneumonia or pneumothorax. Mild chronic appearing interstitial disease with a few calcified granulomas. No congestive failure changes.                                                                      Impression: 1. No acute disease.   This report was finalized on 08/10/2020 20:32 by Dr. Fletcher Hylton MD.      ED Course  ED Course as of Aug 10 2137   Mon Aug 10, 2020   1924 Patient has been incredibly difficult while in the ED.  She has been combative and cussing at the staff.  She is refusing to get stuck, complete a cath, and a chest x-ray.    [TK]   2029 Monocytes Absolute: 0.84 [JH]   2134 After the patient received Haldol, were able to complete laboratory data.  She has a normal potassium.  She does have evidence acute UTI with acute kidney injury.  She has received ceftriaxone while here.  Her white blood count is 18,000.  Hemoglobin 9.0 hematocrit 20.0.  As with Dr. Clayton, hospitalist on-call.  He would like the patient admitted under his care.    [TK]      ED Course User Index  [JH] Bernardino Hartman MD  [TK] Philippe Valdez PA          [facility]    Final diagnoses:   Acute kidney injury (CMS/[facility])   Leukocytosis, unspecified type   Acute UTI   Anemia, unspecified type          Philippe Valdez PA  08/10/20 2137

## 2020-08-11 ENCOUNTER — APPOINTMENT (OUTPATIENT)
Dept: ULTRASOUND IMAGING | Facility: HOSPITAL | Age: 85
End: 2020-08-11

## 2020-08-11 PROBLEM — F03.90 DEMENTIA (HCC): Chronic | Status: ACTIVE | Noted: 2020-08-11

## 2020-08-11 PROBLEM — I10 ESSENTIAL HYPERTENSION: Status: ACTIVE | Noted: 2020-08-11

## 2020-08-11 PROBLEM — N18.30 ACUTE RENAL FAILURE SUPERIMPOSED ON STAGE 3 CHRONIC KIDNEY DISEASE (HCC): Status: ACTIVE | Noted: 2020-08-10

## 2020-08-11 PROBLEM — E87.20 METABOLIC ACIDOSIS: Status: ACTIVE | Noted: 2020-08-11

## 2020-08-11 PROBLEM — N39.0 UTI (URINARY TRACT INFECTION), BACTERIAL: Status: ACTIVE | Noted: 2020-08-11

## 2020-08-11 PROBLEM — I10 HYPERTENSION: Chronic | Status: ACTIVE | Noted: 2020-08-11

## 2020-08-11 PROBLEM — A49.9 UTI (URINARY TRACT INFECTION), BACTERIAL: Status: ACTIVE | Noted: 2020-08-11

## 2020-08-11 PROBLEM — F03.918 DEMENTIA WITH BEHAVIORAL DISTURBANCE (HCC): Status: ACTIVE | Noted: 2020-08-11

## 2020-08-11 PROBLEM — E86.9 VOLUME DEPLETION: Status: ACTIVE | Noted: 2020-08-11

## 2020-08-11 PROBLEM — E03.9 HYPOTHYROIDISM (ACQUIRED): Status: ACTIVE | Noted: 2020-08-11

## 2020-08-11 LAB
ALBUMIN SERPL-MCNC: 1.9 G/DL (ref 3.5–5.2)
ALBUMIN/GLOB SERPL: 0.6 G/DL
ALP SERPL-CCNC: 93 U/L (ref 39–117)
ALT SERPL W P-5'-P-CCNC: 19 U/L (ref 1–33)
ANION GAP SERPL CALCULATED.3IONS-SCNC: 8 MMOL/L (ref 5–15)
AST SERPL-CCNC: 20 U/L (ref 1–32)
BASOPHILS # BLD AUTO: 0.03 10*3/MM3 (ref 0–0.2)
BASOPHILS NFR BLD AUTO: 0.3 % (ref 0–1.5)
BILIRUB SERPL-MCNC: 0.2 MG/DL (ref 0–1.2)
BUN SERPL-MCNC: 25 MG/DL (ref 8–23)
BUN/CREAT SERPL: 12.6 (ref 7–25)
CALCIUM SPEC-SCNC: 7.2 MG/DL (ref 8.2–9.6)
CHLORIDE SERPL-SCNC: 110 MMOL/L (ref 98–107)
CO2 SERPL-SCNC: 18 MMOL/L (ref 22–29)
CREAT SERPL-MCNC: 1.99 MG/DL (ref 0.57–1)
D-LACTATE SERPL-SCNC: 0.8 MMOL/L (ref 0.5–2)
DEPRECATED RDW RBC AUTO: 48 FL (ref 37–54)
EOSINOPHIL # BLD AUTO: 0.12 10*3/MM3 (ref 0–0.4)
EOSINOPHIL NFR BLD AUTO: 1.1 % (ref 0.3–6.2)
ERYTHROCYTE [DISTWIDTH] IN BLOOD BY AUTOMATED COUNT: 14 % (ref 12.3–15.4)
GFR SERPL CREATININE-BSD FRML MDRD: 23 ML/MIN/1.73
GFR SERPL CREATININE-BSD FRML MDRD: 28 ML/MIN/1.73
GLOBULIN UR ELPH-MCNC: 3.1 GM/DL
GLUCOSE SERPL-MCNC: 116 MG/DL (ref 65–99)
HCT VFR BLD AUTO: 22.9 % (ref 34–46.6)
HGB BLD-MCNC: 7.5 G/DL (ref 12–15.9)
IMM GRANULOCYTES # BLD AUTO: 0.08 10*3/MM3 (ref 0–0.05)
IMM GRANULOCYTES NFR BLD AUTO: 0.7 % (ref 0–0.5)
LYMPHOCYTES # BLD AUTO: 2.22 10*3/MM3 (ref 0.7–3.1)
LYMPHOCYTES NFR BLD AUTO: 19.6 % (ref 19.6–45.3)
MAGNESIUM SERPL-MCNC: 1.9 MG/DL (ref 1.7–2.3)
MCH RBC QN AUTO: 30.6 PG (ref 26.6–33)
MCHC RBC AUTO-ENTMCNC: 32.8 G/DL (ref 31.5–35.7)
MCV RBC AUTO: 93.5 FL (ref 79–97)
MONOCYTES # BLD AUTO: 0.66 10*3/MM3 (ref 0.1–0.9)
MONOCYTES NFR BLD AUTO: 5.8 % (ref 5–12)
NEUTROPHILS NFR BLD AUTO: 72.5 % (ref 42.7–76)
NEUTROPHILS NFR BLD AUTO: 8.19 10*3/MM3 (ref 1.7–7)
NRBC BLD AUTO-RTO: 0 /100 WBC (ref 0–0.2)
PHOSPHATE SERPL-MCNC: 2.1 MG/DL (ref 2.5–4.5)
PLATELET # BLD AUTO: 294 10*3/MM3 (ref 140–450)
PMV BLD AUTO: 9.6 FL (ref 6–12)
POTASSIUM SERPL-SCNC: 3.9 MMOL/L (ref 3.5–5.2)
PROT SERPL-MCNC: 5 G/DL (ref 6–8.5)
RBC # BLD AUTO: 2.45 10*6/MM3 (ref 3.77–5.28)
SODIUM SERPL-SCNC: 136 MMOL/L (ref 136–145)
TSH SERPL DL<=0.05 MIU/L-ACNC: 16.13 UIU/ML (ref 0.27–4.2)
WBC # BLD AUTO: 11.3 10*3/MM3 (ref 3.4–10.8)

## 2020-08-11 PROCEDURE — 85025 COMPLETE CBC W/AUTO DIFF WBC: CPT | Performed by: INTERNAL MEDICINE

## 2020-08-11 PROCEDURE — G0378 HOSPITAL OBSERVATION PER HR: HCPCS

## 2020-08-11 PROCEDURE — 83605 ASSAY OF LACTIC ACID: CPT | Performed by: INTERNAL MEDICINE

## 2020-08-11 PROCEDURE — 80053 COMPREHEN METABOLIC PANEL: CPT | Performed by: INTERNAL MEDICINE

## 2020-08-11 RX ORDER — ONDANSETRON 2 MG/ML
4 INJECTION INTRAMUSCULAR; INTRAVENOUS EVERY 6 HOURS PRN
Status: DISCONTINUED | OUTPATIENT
Start: 2020-08-11 | End: 2020-08-17 | Stop reason: HOSPADM

## 2020-08-11 RX ORDER — ESCITALOPRAM OXALATE 10 MG/1
10 TABLET ORAL DAILY
Status: DISCONTINUED | OUTPATIENT
Start: 2020-08-11 | End: 2020-08-17 | Stop reason: HOSPADM

## 2020-08-11 RX ORDER — ATORVASTATIN CALCIUM 10 MG/1
20 TABLET, FILM COATED ORAL DAILY
Status: DISCONTINUED | OUTPATIENT
Start: 2020-08-11 | End: 2020-08-15

## 2020-08-11 RX ORDER — DONEPEZIL HYDROCHLORIDE 10 MG/1
10 TABLET, FILM COATED ORAL NIGHTLY
Status: DISCONTINUED | OUTPATIENT
Start: 2020-08-11 | End: 2020-08-17 | Stop reason: HOSPADM

## 2020-08-11 RX ORDER — SODIUM CHLORIDE 0.9 % (FLUSH) 0.9 %
10 SYRINGE (ML) INJECTION AS NEEDED
Status: DISCONTINUED | OUTPATIENT
Start: 2020-08-11 | End: 2020-08-17 | Stop reason: HOSPADM

## 2020-08-11 RX ORDER — FERROUS SULFATE 325(65) MG
325 TABLET ORAL 2 TIMES DAILY WITH MEALS
Status: DISCONTINUED | OUTPATIENT
Start: 2020-08-11 | End: 2020-08-15

## 2020-08-11 RX ORDER — ONDANSETRON 4 MG/1
4 TABLET, FILM COATED ORAL EVERY 6 HOURS PRN
Status: DISCONTINUED | OUTPATIENT
Start: 2020-08-11 | End: 2020-08-17 | Stop reason: HOSPADM

## 2020-08-11 RX ORDER — BISACODYL 5 MG/1
5 TABLET, DELAYED RELEASE ORAL DAILY PRN
Status: DISCONTINUED | OUTPATIENT
Start: 2020-08-11 | End: 2020-08-17 | Stop reason: HOSPADM

## 2020-08-11 RX ORDER — MEMANTINE HYDROCHLORIDE 5 MG/1
5 TABLET ORAL 2 TIMES DAILY
Status: DISCONTINUED | OUTPATIENT
Start: 2020-08-11 | End: 2020-08-17 | Stop reason: HOSPADM

## 2020-08-11 RX ORDER — POLYETHYLENE GLYCOL 3350 17 G/17G
17 POWDER, FOR SOLUTION ORAL DAILY PRN
Status: DISCONTINUED | OUTPATIENT
Start: 2020-08-11 | End: 2020-08-17 | Stop reason: HOSPADM

## 2020-08-11 RX ORDER — ACETAMINOPHEN 325 MG/1
650 TABLET ORAL EVERY 6 HOURS PRN
Status: DISCONTINUED | OUTPATIENT
Start: 2020-08-11 | End: 2020-08-17 | Stop reason: HOSPADM

## 2020-08-11 RX ORDER — LEVOTHYROXINE SODIUM 137 UG/1
137 TABLET ORAL
Status: DISCONTINUED | OUTPATIENT
Start: 2020-08-11 | End: 2020-08-17 | Stop reason: HOSPADM

## 2020-08-11 RX ORDER — SODIUM CHLORIDE 0.9 % (FLUSH) 0.9 %
10 SYRINGE (ML) INJECTION EVERY 12 HOURS SCHEDULED
Status: DISCONTINUED | OUTPATIENT
Start: 2020-08-11 | End: 2020-08-17 | Stop reason: HOSPADM

## 2020-08-11 RX ORDER — HYDROCODONE BITARTRATE AND ACETAMINOPHEN 10; 325 MG/1; MG/1
1 TABLET ORAL EVERY 4 HOURS PRN
Status: DISCONTINUED | OUTPATIENT
Start: 2020-08-11 | End: 2020-08-17 | Stop reason: HOSPADM

## 2020-08-11 RX ORDER — LEVOTHYROXINE SODIUM 0.12 MG/1
125 TABLET ORAL
Status: DISCONTINUED | OUTPATIENT
Start: 2020-08-11 | End: 2020-08-11

## 2020-08-11 RX ORDER — DOCUSATE SODIUM 100 MG/1
100 CAPSULE, LIQUID FILLED ORAL DAILY
Status: DISCONTINUED | OUTPATIENT
Start: 2020-08-11 | End: 2020-08-17 | Stop reason: HOSPADM

## 2020-08-11 RX ORDER — BRINZOLAMIDE 10 MG/ML
1 SUSPENSION/ DROPS OPHTHALMIC 3 TIMES DAILY
Status: DISCONTINUED | OUTPATIENT
Start: 2020-08-11 | End: 2020-08-17 | Stop reason: HOSPADM

## 2020-08-11 RX ORDER — QUETIAPINE FUMARATE 25 MG/1
25 TABLET, FILM COATED ORAL NIGHTLY
Status: DISCONTINUED | OUTPATIENT
Start: 2020-08-11 | End: 2020-08-17 | Stop reason: HOSPADM

## 2020-08-11 RX ORDER — TIMOLOL MALEATE 5 MG/ML
1 SOLUTION/ DROPS OPHTHALMIC EVERY 12 HOURS SCHEDULED
Status: DISCONTINUED | OUTPATIENT
Start: 2020-08-11 | End: 2020-08-17 | Stop reason: HOSPADM

## 2020-08-11 RX ORDER — MULTIVIT,CALC,MINS/IRON/FOLIC 9MG-400MCG
1 TABLET ORAL DAILY
Status: DISCONTINUED | OUTPATIENT
Start: 2020-08-11 | End: 2020-08-15

## 2020-08-11 RX ORDER — BRIMONIDINE TARTRATE 2 MG/ML
1 SOLUTION/ DROPS OPHTHALMIC 2 TIMES DAILY
Status: DISCONTINUED | OUTPATIENT
Start: 2020-08-11 | End: 2020-08-17 | Stop reason: HOSPADM

## 2020-08-11 RX ORDER — ASPIRIN 81 MG/1
81 TABLET, CHEWABLE ORAL DAILY
Status: DISCONTINUED | OUTPATIENT
Start: 2020-08-11 | End: 2020-08-15

## 2020-08-11 RX ADMIN — SODIUM CHLORIDE, PRESERVATIVE FREE 10 ML: 5 INJECTION INTRAVENOUS at 09:13

## 2020-08-11 RX ADMIN — HYDROCODONE BITARTRATE AND ACETAMINOPHEN 1 TABLET: 10; 325 TABLET ORAL at 15:19

## 2020-08-11 RX ADMIN — SODIUM BICARBONATE: 84 INJECTION, SOLUTION INTRAVENOUS at 21:32

## 2020-08-11 RX ADMIN — TIMOLOL MALEATE 1 DROP: 5 SOLUTION/ DROPS OPHTHALMIC at 20:45

## 2020-08-11 RX ADMIN — BRIMONIDINE TARTRATE 1 DROP: 2 SOLUTION OPHTHALMIC at 20:45

## 2020-08-11 RX ADMIN — QUETIAPINE FUMARATE 25 MG: 25 TABLET ORAL at 20:45

## 2020-08-11 RX ADMIN — BRINZOLAMIDE 1 DROP: 10 SUSPENSION/ DROPS OPHTHALMIC at 20:45

## 2020-08-11 RX ADMIN — SODIUM BICARBONATE 150 MEQ: 84 INJECTION, SOLUTION INTRAVENOUS at 01:52

## 2020-08-11 RX ADMIN — DONEPEZIL HYDROCHLORIDE 10 MG: 10 TABLET, FILM COATED ORAL at 20:45

## 2020-08-11 RX ADMIN — TIMOLOL MALEATE 1 DROP: 5 SOLUTION/ DROPS OPHTHALMIC at 01:45

## 2020-08-11 RX ADMIN — MEMANTINE HYDROCHLORIDE 5 MG: 5 TABLET, FILM COATED ORAL at 20:45

## 2020-08-11 RX ADMIN — SODIUM BICARBONATE: 84 INJECTION, SOLUTION INTRAVENOUS at 10:47

## 2020-08-11 NOTE — PLAN OF CARE
Problem: Patient Care Overview  Goal: Plan of Care Review  8/11/2020 0356 by Danielle Barnett RN  Outcome: Ongoing (interventions implemented as appropriate)  Flowsheets (Taken 8/11/2020 0356)  Progress: no change  Plan of Care Reviewed With: patient  Outcome Summary: Refusing meds and turns.  Still combative at times; hitting and cussing at staff.  IV fluids changed to D5 with bicarb.  VSS.  Safety maintained; bed alarm in place.  Will continue to monitor.

## 2020-08-11 NOTE — PROGRESS NOTES
[facility] Medicine Services  INPATIENT PROGRESS NOTE    Patient Name: Andres Walter  Date of Admission: 8/10/2020  Today's Date: 08/11/20  Length of Stay: 0  Primary Care Physician: Jerry Montgomery MD    Subjective   Chief Complaint: Weakness.  [facility]   Nursing has discussed the case with the patient's son by phone this morning.  It sounds that she is at her baseline.  She has frequent problems with agitation and combativeness at the nursing facility.    She is hungry and wants to eat.  She was nothing by mouth for renal ultrasound, which I do not believe that she needs at this point.  Iram is going to cancel it and feed her.    Review of Systems   Difficult with her dementia.    Objective    Temp:  [98.3 °F (36.8 °C)-99.5 °F (37.5 °C)] 98.9 °F (37.2 °C)  Heart Rate:  [56-71] 65  Resp:  [14-18] 14  BP: ()/(39-70) 141/48  Physical Exam   Constitutional:   Up in bed.  No distress.  Not agitated or anxious at present.  No family present.  Seen and discussed with her nurse, Iram.   HENT:   Head: Normocephalic and atraumatic.   Dry mucous membranes.   Eyes: Pupils are equal, round, and reactive to light. Conjunctivae are normal.   Neck: Neck supple. No JVD present.   Cardiovascular: Normal rate, regular rhythm, normal heart sounds and intact distal pulses. Exam reveals no gallop and no friction rub.   No murmur heard.  Pulmonary/Chest: Effort normal and breath sounds normal. No respiratory distress. She has no wheezes. She has no rales. She exhibits no tenderness.   Abdominal: Soft. Bowel sounds are normal. She exhibits no distension. There is no tenderness. There is no rebound and no guarding.   Musculoskeletal: Normal range of motion. She exhibits no edema, tenderness or deformity.   Neurological: She is alert. She displays normal reflexes. She exhibits normal muscle tone.   Skin: Skin is warm and dry. No rash noted.   Psychiatric:   Pleasantly confused.     Results  Review:  I have reviewed the labs, radiology results, and diagnostic studies.    Laboratory Data:   Results from last 7 days   Lab Units 08/11/20  0450 08/10/20  1753   WBC 10*3/mm3 11.30* 18.73*   HEMOGLOBIN g/dL 7.5* 9.0*   HEMATOCRIT % 22.9* 28.0*   PLATELETS 10*3/mm3 294 373     Results from last 7 days   Lab Units 08/11/20  0450 08/10/20  2010 08/10/20  1753   SODIUM mmol/L 136  --  135*   POTASSIUM mmol/L 3.9 4.2 4.9   CHLORIDE mmol/L 110*  --  108*   CO2 mmol/L 18.0*  --  17.0*   BUN mg/dL 25*  --  27*   CREATININE mg/dL 1.99*  --  2.21*   CALCIUM mg/dL 7.2*  --  8.0*   BILIRUBIN mg/dL 0.2  --  0.3   ALK PHOS U/L 93  --  122*   ALT (SGPT) U/L 19  --  29   AST (SGOT) U/L 20  --  38*   GLUCOSE mg/dL 116*  --  97     Radiology Data:   Imaging Results (Last 24 Hours)     Procedure Component Value Units Date/Time    XR Chest 1 View [[id number]] Collected:  08/10/20 2031     Updated:  08/10/20 2035    Narrative:       EXAMINATION: XR CHEST 1 VW-     8/10/2020 8:17 PM CDT     HISTORY: hyperkalemia     1 view chest x-ray compared with 2/1/2018.     Heart size is normal.  The mediastinum is within normal limits.      The lungs are mildly hyperexpanded with no pneumonia or pneumothorax.  Mild chronic appearing interstitial disease with a few calcified  granulomas.  No congestive failure changes.                                                                       Impression:       1. No acute disease.        This report was finalized on 08/10/2020 20:32 by Dr. Fletcher Hylton MD.        I have reviewed the patient's current medications.     Assessment/Plan     Active Hospital Problems    Diagnosis   • **Acute renal failure superimposed on stage 3 chronic kidney disease (CMS/HCC)   • Metabolic acidosis   • Volume depletion   • UTI (urinary tract infection), bacterial   • Dementia with behavioral disturbance (CMS/HCC)   • Hypertension   • Hypothyroidism (acquired)     Plan:  The patient was admitted on 8/10 by Dr. Clayton.   She presented to the emergency department from her skilled nursing facility with abnormal labs.  She had outpatient labs and found her to be in worsening renal failure.  There are also concerns that she has just been declining in general way.  She has dementia and has been more combative and agitated than usual.  She was also felt to have a urinary tract infection.    She is being treated with ceftriaxone.  Urine culture pending.    Renal function improving with hydration.  Change the composition of her fluids to include bicarbonate.  She has a history of CKD, stage III.  Her most recent serum creatinine in our facility was 1.02 in February 2019.    Levothyroxine increased slightly secondary to under suppressed TSH.    Other home medications reconciled and resumed as appropriate.  Consider low-dose Seroquel at night given her problems with combativeness and agitation.  Continue Namenda and Aricept.    SCDs for DVT prophylaxis.    Discharge Planning: Lives at [facility].     Electronically signed by Edmund Recio DO, 08/11/20, 08:25.

## 2020-08-11 NOTE — PLAN OF CARE
Problem: Patient Care Overview  Goal: Plan of Care Review  Outcome: Ongoing (interventions implemented as appropriate)  Flowsheets (Taken 8/11/2020 6586)  Plan of Care Reviewed With: patient  Note:   Pt is on a Regular PO diet. PO intake 25% of 1 documented meal. Ordered Boost daily with lunch and dinner.  She resides at a NH and will likely return there at discharge. MSA submitted to MD. Will continue to follow.

## 2020-08-11 NOTE — PLAN OF CARE
Problem: Patient Care Overview  Goal: Plan of Care Review  Outcome: Ongoing (interventions implemented as appropriate)  Flowsheets (Taken 8/11/2020 0013)  Progress: no change  Plan of Care Reviewed With: patient  Outcome Summary: New admit to 463 from ED.  Pt's potassium elevated at NH; adminstered Kayexalate there.  Creatinine and BUN elevated; physician requested transfer to [facility].  Pt was combative in ER; received Haldol.  Pt somewhat sedated upon arrival to floor but still verbally abusive to staff.  IV fluids infusing.  Bed alarm in place.  Difficult to assess d/t pt being uncooperative.  Awaiting orders from physician.

## 2020-08-11 NOTE — PROGRESS NOTES
Continued Stay Note   [facility]     Patient Name: Andres Walter  MRN: [id number]  Today's Date: 8/11/2020    Admit Date: 8/10/2020    Discharge Plan     Row Name 08/11/20 1119       Plan    Plan Comments  Pt came here from [facility], left message for Abbe in Admissions [phone] to call back bed status. Will follow.         Discharge Codes    No documentation.             REYMUNDO Mojica

## 2020-08-11 NOTE — H&P
[facility] Medicine Services  HISTORY AND PHYSICAL    Date of Admission: 8/10/2020  Primary Care Physician: Jerry Montgomery MD    Subjective     Chief Complaint: Increased confusion abnormal labs    History of Present Illness  Patient is 89-year-old female past medical history of dementia resides at a nursing home.  Apparently her labs have been having significant problems with hyper and hypokalemia.  Her BUN and creatinine of also gradually been going up.  Due to these problems she was sent over here for further evaluation she was found to have a creatinine of 2.211 we have in February of last year was 1 her white count is also over 18,000.  She is found to have evidence of a UTI as well.  Patient is very combative and agitated cursing at me numerous times while I was in the room.  She refused examination when I requested to listen to her heart told me there was no way in hell that I could do that.  She had to be sedated just to get labs and a urine specimen from her in the emergency room.  Due to these findings and worsening renal function we been asked to admit her.  She also has an acidosis probably related to her renal disease.        Review of Systems   Unable to obtain reliable review of systems patient is confused  Otherwise complete ROS reviewed and negative except as mentioned in the HPI.    Past Medical History:   Past Medical History:   Diagnosis Date   • Arthritis    • Dementia (CMS/HCC)    • Disease of thyroid gland    • Encephalopathy    • Hyperlipidemia    • Hypertension    • Hypothyroid      Past Surgical History:  Past Surgical History:   Procedure Laterality Date   • HIP HEMIARTHROPLASTY Left 2/2/2018    Procedure: HIP HEMIARTHROPLASTY;  Surgeon: Emory Barger MD;  Location: [facility];  Service:    • TOTAL HIP ARTHROPLASTY Left 02/02/2018     Social History:  reports that she has never smoked. She has never used smokeless tobacco. She reports that she does not  drink alcohol or use drugs.    Family History: family history is not on file.   Unable to determine family history patient is confused    Allergies:  No Known Allergies  Medications:  Prior to Admission medications    Medication Sig Start Date End Date Taking? Authorizing Provider   acetaminophen (TYLENOL) 325 MG tablet Take 650 mg by mouth Every 6 (Six) Hours As Needed for Mild Pain .   Yes King Lewis MD   aspirin 81 MG chewable tablet Chew 81 mg Daily.   Yes King Lewis MD   atorvastatin (LIPITOR) 20 MG tablet Take 20 mg by mouth Daily.   Yes King Lewis MD   atorvastatin (LIPITOR) 20 MG tablet Take 20 mg by mouth Daily.   Yes King Lewis MD   bisacodyl (DULCOLAX) 5 MG EC tablet Take 5 mg by mouth Daily As Needed for Constipation.   Yes King Lewis MD   bisoprolol-hydrochlorothiazide (ZIAC) 5-6.25 MG per tablet Take 1 tablet by mouth Daily.   Yes King Lewis MD   brimonidine (ALPHAGAN) 0.2 % ophthalmic solution Administer 1 drop to both eyes 2 (two) times a day.   Yes King Lewis MD   brinzolamide (AZOPT) 1 % ophthalmic suspension Administer 1 drop to both eyes 3 (Three) Times a Day.   Yes King Lewis MD   docusate sodium (COLACE) 100 MG capsule Take 100 mg by mouth Daily.   Yes King Lewis MD   donepezil (ARICEPT) 10 MG tablet Take 10 mg by mouth Every Night.   Yes King Lewis MD   escitalopram (LEXAPRO) 10 MG tablet Take 10 mg by mouth Daily.   Yes King Lewis MD   ferrous sulfate 325 (65 FE) MG tablet Take 1 tablet by mouth 2 (Two) Times a Day With Meals. 2/6/18  Yes Som Roach MD   HYDROcodone-acetaminophen (NORCO)  MG per tablet Take 1 tablet by mouth Every 4 (Four) Hours As Needed for Moderate Pain .   Yes King Lewis MD   levothyroxine sodium (TIROSINT) 125 MCG capsule capsule Take 125 mcg by mouth Daily.   Yes King Lewis MD   memantine (NAMENDA) 5 MG tablet  "Take 5 mg by mouth 2 (Two) Times a Day.   Yes King Lewis MD   multivitamin (DAILY LANETTE) tablet tablet Take  by mouth Daily.   Yes King Lewis MD   polyethylene glycol (MIRALAX) packet Take 17 g by mouth Daily As Needed (constipation).   Yes King Lewis MD   timolol (TIMOPTIC) 0.5 % ophthalmic solution Administer 1 drop to both eyes 2 (Two) Times a Day.   Yes King Lewis MD   brimonidine-timolol (COMBIGAN) 0.2-0.5 % ophthalmic solution Administer 1 drop to both eyes Every 12 (Twelve) Hours.    King Lewis MD   Calcium 600-400 MG-UNIT chewable tablet Chew 2 (two) times a day.    King Lewis MD   ciprofloxacin (CIPRO) 500 MG tablet Take 500 mg by mouth 2 (Two) Times a Day.    King Lewis MD   dorzolamide (TRUSOPT) 2 % ophthalmic solution Administer 1 drop to both eyes 3 (Three) Times a Day.    King Lewis MD   ondansetron (ZOFRAN) 4 MG tablet Take 1 tablet by mouth Every 6 (Six) Hours As Needed for Nausea. 2/6/18   Som Roach MD   ondansetron (ZOFRAN) 4 MG tablet Take 4 mg by mouth Every 8 (Eight) Hours As Needed for Nausea or Vomiting.    King Lewis MD   sertraline (ZOLOFT) 25 MG tablet Take 25 mg by mouth Daily.    King Lewis MD   sertraline (ZOLOFT) 25 MG tablet Take 25 mg by mouth Daily.    King Lewis MD     Objective     Vital Signs: BP (!) 160/39 (BP Location: Left arm, Patient Position: Lying) Comment: Danielle RN notified  Pulse 56   Temp 98.8 °F (37.1 °C) (Axillary)   Resp 18   Ht 160 cm (63\")   Wt 40.6 kg (89 lb 9.6 oz)   SpO2 99%   BMI 15.87 kg/m²   Physical Exam   Patient is a chronically ill-appearing white female.  HEENT: Atraumatic, normocephalic.  Pupils equal, round, and reactive to light. Extraocular movements intact.  Sclerae anicteric.  External ears negative.  Mucous membranes moist.  Neck is supple without lymphadenopathy.  No JVD is noted.   Chest: Exam refused  CV: " Exam refused.  Abdomen:   Extremities:   Neuro: Patient is awake, alert, and oriented × unable to determine rest of exam refused  Skin: Exam refused  Sensorium: Confused and agitated    Nursing notes and vital signs reviewed        Results Reviewed:  Lab Results (last 24 hours)     Procedure Component Value Units Date/Time    CBC Auto Differential [[id number]]  (Abnormal) Collected:  08/11/20 0450    Specimen:  Blood Updated:  08/11/20 0605     WBC 11.30 10*3/mm3      RBC 2.45 10*6/mm3      Hemoglobin 7.5 g/dL      Hematocrit 22.9 %      MCV 93.5 fL      MCH 30.6 pg      MCHC 32.8 g/dL      RDW 14.0 %      RDW-SD 48.0 fl      MPV 9.6 fL      Platelets 294 10*3/mm3      Neutrophil % 72.5 %      Lymphocyte % 19.6 %      Monocyte % 5.8 %      Eosinophil % 1.1 %      Basophil % 0.3 %      Immature Grans % 0.7 %      Neutrophils, Absolute 8.19 10*3/mm3      Lymphocytes, Absolute 2.22 10*3/mm3      Monocytes, Absolute 0.66 10*3/mm3      Eosinophils, Absolute 0.12 10*3/mm3      Basophils, Absolute 0.03 10*3/mm3      Immature Grans, Absolute 0.08 10*3/mm3      nRBC 0.0 /100 WBC     Comprehensive Metabolic Panel [[id number]] Collected:  08/11/20 0450    Specimen:  Blood Updated:  08/11/20 0553    Magnesium [[id number]]  (Normal) Collected:  08/10/20 2010    Specimen:  Blood Updated:  08/11/20 0122     Magnesium 1.9 mg/dL     Phosphorus [[id number]]  (Abnormal) Collected:  08/10/20 2010    Specimen:  Blood Updated:  08/11/20 0122     Phosphorus 2.1 mg/dL     TSH [[id number]]  (Abnormal) Collected:  08/10/20 2010    Specimen:  Blood Updated:  08/11/20 0122     TSH 16.130 uIU/mL     Potassium [[id number]]  (Normal) Collected:  08/10/20 2010    Specimen:  Blood Updated:  08/10/20 2027     Potassium 4.2 mmol/L     Urinalysis, Microscopic Only - Urine, Catheter [[id number]]  (Abnormal) Collected:  08/10/20 2007    Specimen:  Urine, Catheter Updated:  08/10/20 2026     RBC, UA 6-12 /HPF      WBC, UA Too Numerous to Count /HPF       Bacteria, UA 4+ /HPF      Squamous Epithelial Cells, UA None Seen /HPF      Hyaline Casts, UA 3-6 /LPF      Methodology Automated Microscopy    Urinalysis With Culture If Indicated - Urine, Catheter [[id number]]  (Abnormal) Collected:  08/10/20 2007    Specimen:  Urine, Catheter Updated:  08/10/20 2026     Color, UA Yellow     Appearance, UA Cloudy     pH, UA 5.5     Specific Gravity, UA 1.025     Glucose, UA Negative     Ketones, UA Negative     Bilirubin, UA Negative     Blood, UA Moderate (2+)     Protein, UA 30 mg/dL (1+)     Leuk Esterase, UA Moderate (2+)     Nitrite, UA Negative     Urobilinogen, UA 0.2 E.U./dL    Urine Culture - Urine, Urine, Catheter [[id number]] Collected:  08/10/20 2007    Specimen:  Urine, Catheter Updated:  08/10/20 2026    COVID PRE-OP / PRE-PROCEDURE SCREENING ORDER (NO ISOLATION) - Swab, Nasopharynx [[id number]] Collected:  08/10/20 1734    Specimen:  Swab from Nasopharynx Updated:  08/10/20 1847    Narrative:       The following orders were created for panel order COVID PRE-OP / PRE-PROCEDURE SCREENING ORDER (NO ISOLATION) - Swab, Nasopharynx.  Procedure                               Abnormality         Status                     ---------                               -----------         ------                     COVID-19,CEPHEID,COR/KASSIDY...[[id number]]  Normal              Final result                 Please view results for these tests on the individual orders.    COVID-19,CEPHEID,COR/KASSIDY/PAD IN-HOUSE(OR EMERGENT/ADD-ON),NP SWAB IN TRANSPORT MEDIA 3-4 HR TAT - Swab, Nasopharynx [[id number]]  (Normal) Collected:  08/10/20 1734    Specimen:  Swab from Nasopharynx Updated:  08/10/20 1847     COVID19 Not Detected    Narrative:       Fact sheet for providers: https://www.fda.gov/media/803464/download     Fact sheet for patients: https://www.fda.gov/media/857830/download    Comprehensive Metabolic Panel [[id number]]  (Abnormal) Collected:  08/10/20 1753    Specimen:  Blood Updated:  08/10/20  1842     Glucose 97 mg/dL      BUN 27 mg/dL      Creatinine 2.21 mg/dL      Sodium 135 mmol/L      Potassium 4.9 mmol/L      Comment: Specimen hemolyzed.  Results may be affected.        Chloride 108 mmol/L      CO2 17.0 mmol/L      Calcium 8.0 mg/dL      Total Protein 6.3 g/dL      Albumin 2.10 g/dL      ALT (SGPT) 29 U/L      AST (SGOT) 38 U/L      Comment: Specimen hemolyzed.  Results may be affected.        Alkaline Phosphatase 122 U/L      Total Bilirubin 0.3 mg/dL      eGFR Non African Amer 21 mL/min/1.73      eGFR  African Amer 25 mL/min/1.73      Globulin 4.2 gm/dL      A/G Ratio 0.5 g/dL      BUN/Creatinine Ratio 12.2     Anion Gap 10.0 mmol/L     Narrative:       GFR Normal >60  Chronic Kidney Disease <60  Kidney Failure <15      Troponin [[id number]]  (Abnormal) Collected:  08/10/20 1753    Specimen:  Blood Updated:  08/10/20 1841     Troponin T 0.068 ng/mL     Narrative:       Troponin T Reference Range:  <= 0.03 ng/mL-   Negative for AMI  >0.03 ng/mL-     Abnormal for myocardial necrosis.  Clinicians would have to utilize clinical acumen, EKG, Troponin and serial changes to determine if it is an Acute Myocardial Infarction or myocardial injury due to an underlying chronic condition.       Results may be falsely decreased if patient taking Biotin.      CK [[id number]]  (Normal) Collected:  08/10/20 1753    Specimen:  Blood Updated:  08/10/20 1837     Creatine Kinase 37 U/L     CBC & Differential [[id number]] Collected:  08/10/20 1753    Specimen:  Blood Updated:  08/10/20 1813    Narrative:       The following orders were created for panel order CBC & Differential.  Procedure                               Abnormality         Status                     ---------                               -----------         ------                     CBC Auto Differential[[id number]]        Abnormal            Final result                 Please view results for these tests on the individual orders.    CBC Auto  Differential [[id number]]  (Abnormal) Collected:  08/10/20 1753    Specimen:  Blood Updated:  08/10/20 1813     WBC 18.73 10*3/mm3      RBC 2.99 10*6/mm3      Hemoglobin 9.0 g/dL      Hematocrit 28.0 %      MCV 93.6 fL      MCH 30.1 pg      MCHC 32.1 g/dL      RDW 14.3 %      RDW-SD 48.3 fl      MPV 9.6 fL      Platelets 373 10*3/mm3      Neutrophil % 77.8 %      Lymphocyte % 16.2 %      Monocyte % 4.5 %      Eosinophil % 0.8 %      Basophil % 0.2 %      Immature Grans % 0.5 %      Neutrophils, Absolute 14.56 10*3/mm3      Lymphocytes, Absolute 3.04 10*3/mm3      Monocytes, Absolute 0.84 10*3/mm3      Eosinophils, Absolute 0.15 10*3/mm3      Basophils, Absolute 0.04 10*3/mm3      Immature Grans, Absolute 0.10 10*3/mm3      nRBC 0.0 /100 WBC         Imaging Results (Last 24 Hours)     Procedure Component Value Units Date/Time    XR Chest 1 View [[id number]] Collected:  08/10/20 2031     Updated:  08/10/20 2035    Narrative:       EXAMINATION: XR CHEST 1 VW-     8/10/2020 8:17 PM CDT     HISTORY: hyperkalemia     1 view chest x-ray compared with 2/1/2018.     Heart size is normal.  The mediastinum is within normal limits.      The lungs are mildly hyperexpanded with no pneumonia or pneumothorax.  Mild chronic appearing interstitial disease with a few calcified  granulomas.  No congestive failure changes.                                                                       Impression:       1. No acute disease.        This report was finalized on 08/10/2020 20:32 by Dr. Fletcher Hylton MD.        I have personally reviewed and interpreted the radiology studies and ECG obtained at time of admission.     Assessment / Plan     Assessment:   Active Hospital Problems    Diagnosis   • Dementia (CMS/HCC)   • Hypertension   • UTI (urinary tract infection), bacterial   • Acute kidney injury (CMS/HCC)   1.  Metabolic encephalopathy due to UTI worsening renal function placed admit patient hydrate with bicarbonate drip.  We will  culture blood and urine.  Will initiate Rocephin IV and adjust depending on outcome of cultures.  2.  UTI as outlined above.  3.  Acute on chronic renal failure hydrate with IV fluids containing sodium bicarb and recheck labs in a.m.  Will obtain renal ultrasound in a.m.  4.  Dementia worsening probably due to above.  Medicate as needed for behavior.  5.  Hypothyroidism her TSH is significantly elevated will adjust her Synthroid dose.  6.  Anemia this appears to be chronic we will check substrates.      Patient seen prior to midnight         Code Status: Full     I discussed the patient's findings and my recommendations with the patient.  Her son is her surrogate healthcare decision maker.    Estimated length of stay greater than 2 nights.    Patient seen and examined by me on August 10th 2020 at 11:45 PM.    Electronically signed by Forest Clayton MD, 08/11/20, 06:20.

## 2020-08-12 ENCOUNTER — APPOINTMENT (OUTPATIENT)
Dept: ULTRASOUND IMAGING | Facility: HOSPITAL | Age: 85
End: 2020-08-12

## 2020-08-12 LAB
ANION GAP SERPL CALCULATED.3IONS-SCNC: 8 MMOL/L (ref 5–15)
BACTERIA SPEC AEROBE CULT: ABNORMAL
BUN SERPL-MCNC: 23 MG/DL (ref 8–23)
BUN/CREAT SERPL: 12 (ref 7–25)
CALCIUM SPEC-SCNC: 7.2 MG/DL (ref 8.2–9.6)
CHLORIDE SERPL-SCNC: 104 MMOL/L (ref 98–107)
CO2 SERPL-SCNC: 23 MMOL/L (ref 22–29)
CREAT SERPL-MCNC: 1.92 MG/DL (ref 0.57–1)
DEPRECATED RDW RBC AUTO: 47.7 FL (ref 37–54)
ERYTHROCYTE [DISTWIDTH] IN BLOOD BY AUTOMATED COUNT: 14.1 % (ref 12.3–15.4)
GFR SERPL CREATININE-BSD FRML MDRD: 24 ML/MIN/1.73
GFR SERPL CREATININE-BSD FRML MDRD: 30 ML/MIN/1.73
GLUCOSE SERPL-MCNC: 80 MG/DL (ref 65–99)
HCT VFR BLD AUTO: 23.4 % (ref 34–46.6)
HGB BLD-MCNC: 7.6 G/DL (ref 12–15.9)
MCH RBC QN AUTO: 30.4 PG (ref 26.6–33)
MCHC RBC AUTO-ENTMCNC: 32.5 G/DL (ref 31.5–35.7)
MCV RBC AUTO: 93.6 FL (ref 79–97)
PLATELET # BLD AUTO: 281 10*3/MM3 (ref 140–450)
PMV BLD AUTO: 9.5 FL (ref 6–12)
POTASSIUM SERPL-SCNC: 3.7 MMOL/L (ref 3.5–5.2)
RBC # BLD AUTO: 2.5 10*6/MM3 (ref 3.77–5.28)
SODIUM SERPL-SCNC: 135 MMOL/L (ref 136–145)
WBC # BLD AUTO: 11.3 10*3/MM3 (ref 3.4–10.8)

## 2020-08-12 PROCEDURE — 25010000002 CEFTRIAXONE PER 250 MG: Performed by: INTERNAL MEDICINE

## 2020-08-12 PROCEDURE — G0378 HOSPITAL OBSERVATION PER HR: HCPCS

## 2020-08-12 PROCEDURE — 85027 COMPLETE CBC AUTOMATED: CPT | Performed by: INTERNAL MEDICINE

## 2020-08-12 PROCEDURE — 80048 BASIC METABOLIC PNL TOTAL CA: CPT | Performed by: INTERNAL MEDICINE

## 2020-08-12 RX ADMIN — DOCUSATE SODIUM 100 MG: 100 CAPSULE ORAL at 08:51

## 2020-08-12 RX ADMIN — Medication 1 TABLET: at 08:51

## 2020-08-12 RX ADMIN — FERROUS SULFATE TAB 325 MG (65 MG ELEMENTAL FE) 325 MG: 325 (65 FE) TAB at 08:51

## 2020-08-12 RX ADMIN — SODIUM BICARBONATE: 84 INJECTION, SOLUTION INTRAVENOUS at 06:38

## 2020-08-12 RX ADMIN — SODIUM BICARBONATE: 84 INJECTION, SOLUTION INTRAVENOUS at 20:38

## 2020-08-12 RX ADMIN — ESCITALOPRAM 10 MG: 10 TABLET, FILM COATED ORAL at 08:51

## 2020-08-12 RX ADMIN — ATORVASTATIN CALCIUM 20 MG: 10 TABLET, FILM COATED ORAL at 08:51

## 2020-08-12 RX ADMIN — MEMANTINE HYDROCHLORIDE 5 MG: 5 TABLET, FILM COATED ORAL at 08:51

## 2020-08-12 RX ADMIN — CEFTRIAXONE SODIUM 1 G: 1 INJECTION, POWDER, FOR SOLUTION INTRAMUSCULAR; INTRAVENOUS at 12:14

## 2020-08-12 RX ADMIN — ASPIRIN 81 MG: 81 TABLET, CHEWABLE ORAL at 08:51

## 2020-08-12 NOTE — PLAN OF CARE
Problem: Patient Care Overview  Goal: Plan of Care Review  Outcome: Ongoing (interventions implemented as appropriate)  Flowsheets (Taken 8/12/2020 0400)  Progress: no change  Plan of Care Reviewed With: patient  Outcome Summary: No s/s or c/o pain.  IV fluids infusing.  Pt more relaxed this shift; took meds in applesauce.  Turn q 2 hours.  Safety maintained.  VSS.  SB-NSR 4-60 on tele.  Will continue to monitor.  Please contact son when patient departs for [place].

## 2020-08-12 NOTE — PROGRESS NOTES
Discharge Planning Assessment  [facility]     Patient Name: Andres Walter  MRN: [id number]  Today's Date: 8/12/2020    Admit Date: 8/10/2020    Discharge Needs Assessment     Row Name 08/12/20 0938       Living Environment    Lives With  facility resident    Name(s) of Who Lives With Patient  Resides at [facility]    Current Living Arrangements  extended care facility    Primary Care Provided by  other (see comments)    Provides Primary Care For  no one, unable/limited ability to care for self    Able to Return to Prior Arrangements  yes       Resource/Environmental Concerns    Resource/Environmental Concerns  none       Transition Planning    Patient/Family Anticipates Transition to  long term care facility       Discharge Needs Assessment    Discharge Facility/Level of Care Needs  nursing facility, intermediate        Discharge Plan     Row Name 08/12/20 0922       Plan    [facility]    Patient/Family in Agreement with Plan  yes    Plan Comments  Spoke with Abbe from [facility] [phone], bed held for pt's return at [facility] level care.         Destination      Coordination has not been started for this encounter.      Durable Medical Equipment      Coordination has not been started for this encounter.      Dialysis/Infusion      Coordination has not been started for this encounter.      Home Medical Care      Coordination has not been started for this encounter.      Therapy      Coordination has not been started for this encounter.      Community Resources      Coordination has not been started for this encounter.          Demographic Summary    No documentation.       Functional Status    No documentation.       Psychosocial    No documentation.       Abuse/Neglect    No documentation.       Legal    No documentation.       Substance Abuse    No documentation.       Patient Forms    No documentation.           REYMUNDO Mojica

## 2020-08-12 NOTE — PROGRESS NOTES
[facility] Medicine Services  INPATIENT PROGRESS NOTE    Patient Name: Andres Walter  Date of Admission: 8/10/2020  Today's Date: 08/12/20  Length of Stay: 0  Primary Care Physician: Jerry Montgomery MD    Subjective   Chief Complaint: Weakness.  HPI   No major events overnight.  She apparently slept well with Seroquel.  She is easy to arouse this morning.  She is not agitated at present.    Renal function slowly improving.  Will slow fluids today.    Awaiting final urine culture result for truncation of antibiotics.    Likely back to skilled nursing tomorrow.    Review of Systems   Difficult with her dementia.    Objective    Temp:  [97.4 °F (36.3 °C)-99.8 °F (37.7 °C)] 97.4 °F (36.3 °C)  Heart Rate:  [53-73] 61  Resp:  [14-18] 18  BP: ()/(31-51) 130/37  Physical Exam   Constitutional:   Up in bed.  No distress.  Not agitated or anxious at present.  No family present.  Seen and discussed with her nurse, Mary.   HENT:   Head: Normocephalic and atraumatic.   Dry mucous membranes.   Eyes: Pupils are equal, round, and reactive to light. Conjunctivae are normal.   Neck: Neck supple. No JVD present.   Cardiovascular: Normal rate, regular rhythm, normal heart sounds and intact distal pulses. Exam reveals no gallop and no friction rub.   No murmur heard.  Pulmonary/Chest: Effort normal and breath sounds normal. No respiratory distress. She has no wheezes. She has no rales. She exhibits no tenderness.   Abdominal: Soft. Bowel sounds are normal. She exhibits no distension. There is no tenderness. There is no rebound and no guarding.   Musculoskeletal: Normal range of motion. She exhibits no edema, tenderness or deformity.   Neurological: She is alert. She displays normal reflexes. She exhibits normal muscle tone.   Skin: Skin is warm and dry. No rash noted.   Psychiatric:   Pleasantly confused.     Results Review:  I have reviewed the labs, radiology results, and diagnostic  studies.    Laboratory Data:   Results from last 7 days   Lab Units 08/12/20  0559 08/11/20  0450 08/10/20  1753   WBC 10*3/mm3 11.30* 11.30* 18.73*   HEMOGLOBIN g/dL 7.6* 7.5* 9.0*   HEMATOCRIT % 23.4* 22.9* 28.0*   PLATELETS 10*3/mm3 281 294 373     Results from last 7 days   Lab Units 08/12/20  0559 08/11/20  0450 08/10/20  2010 08/10/20  1753   SODIUM mmol/L 135* 136  --  135*   POTASSIUM mmol/L 3.7 3.9 4.2 4.9   CHLORIDE mmol/L 104 110*  --  108*   CO2 mmol/L 23.0 18.0*  --  17.0*   BUN mg/dL 23 25*  --  27*   CREATININE mg/dL 1.92* 1.99*  --  2.21*   CALCIUM mg/dL 7.2* 7.2*  --  8.0*   BILIRUBIN mg/dL  --  0.2  --  0.3   ALK PHOS U/L  --  93  --  122*   ALT (SGPT) U/L  --  19  --  29   AST (SGOT) U/L  --  20  --  38*   GLUCOSE mg/dL 80 116*  --  97     I have reviewed the patient's current medications.     Assessment/Plan     Active Hospital Problems    Diagnosis   • **Acute renal failure superimposed on stage 3 chronic kidney disease (CMS/HCC)   • Metabolic acidosis   • Volume depletion   • UTI (urinary tract infection), bacterial   • Dementia with behavioral disturbance (CMS/[facility])   • Essential hypertension   • Hypothyroidism (acquired)     Plan:  The patient was admitted on 8/10 by Dr. Clayton.  She presented to the emergency department from her skilled nursing facility with abnormal labs.  She had outpatient labs and found her to be in worsening renal failure.  There are also concerns that she has just been declining in general way.  She has dementia and has been more combative and agitated than usual.  She was also felt to have a urinary tract infection.    She is being treated with ceftriaxone.  Urine culture pending.  Gram-negative bacilli thus far.  Awaiting the exact pathogen and susceptibilities to tailor antibiotics.    Renal function improved with hydration, but stalled a bit today. Able to void, incontinent.  Changed the composition of her fluids to include bicarbonate on 8/11.  She has a history of  CKD, stage III.  Her most recent serum creatinine in our facility was 1.02 in February 2019.    Levothyroxine increased slightly secondary to under suppressed TSH.    Other home medications reconciled and resumed as appropriate.  Started low-dose Seroquel at night given her problems with combativeness and agitation.  Continue Namenda and Aricept.    Tolerating a regular diet per nursing.  Supplements added by nutrition.    SCDs for DVT prophylaxis.    Discharge Planning: Lives at [facility].  Probable discharge tomorrow.    Electronically signed by Edmund Recio DO, 08/12/20, 09:34.

## 2020-08-13 ENCOUNTER — APPOINTMENT (OUTPATIENT)
Dept: CT IMAGING | Facility: HOSPITAL | Age: 85
End: 2020-08-13

## 2020-08-13 PROBLEM — N20.0 STAGHORN CALCULUS: Status: ACTIVE | Noted: 2020-08-13

## 2020-08-13 PROBLEM — B96.89 URINARY TRACT INFECTION DUE TO KLEBSIELLA SPECIES: Status: ACTIVE | Noted: 2020-08-11

## 2020-08-13 PROBLEM — E43 SEVERE MALNUTRITION (HCC): Status: ACTIVE | Noted: 2020-08-13

## 2020-08-13 PROBLEM — N13.30 HYDRONEPHROSIS OF RIGHT KIDNEY: Status: ACTIVE | Noted: 2020-08-13

## 2020-08-13 PROBLEM — K56.41 IMPACTED STOOL IN RECTUM (HCC): Status: ACTIVE | Noted: 2020-08-13

## 2020-08-13 LAB
ANION GAP SERPL CALCULATED.3IONS-SCNC: 15 MMOL/L (ref 5–15)
BUN SERPL-MCNC: 26 MG/DL (ref 8–23)
BUN/CREAT SERPL: 14.4 (ref 7–25)
CALCIUM SPEC-SCNC: 7.3 MG/DL (ref 8.2–9.6)
CHLORIDE SERPL-SCNC: 99 MMOL/L (ref 98–107)
CO2 SERPL-SCNC: 20 MMOL/L (ref 22–29)
CREAT SERPL-MCNC: 1.8 MG/DL (ref 0.57–1)
DEPRECATED RDW RBC AUTO: 48.5 FL (ref 37–54)
ERYTHROCYTE [DISTWIDTH] IN BLOOD BY AUTOMATED COUNT: 14.1 % (ref 12.3–15.4)
GFR SERPL CREATININE-BSD FRML MDRD: 26 ML/MIN/1.73
GFR SERPL CREATININE-BSD FRML MDRD: 32 ML/MIN/1.73
GLUCOSE BLDC GLUCOMTR-MCNC: 275 MG/DL (ref 70–130)
GLUCOSE BLDC GLUCOMTR-MCNC: 52 MG/DL (ref 70–130)
GLUCOSE BLDC GLUCOMTR-MCNC: 54 MG/DL (ref 70–130)
GLUCOSE BLDC GLUCOMTR-MCNC: 93 MG/DL (ref 70–130)
GLUCOSE SERPL-MCNC: 39 MG/DL (ref 65–99)
HCT VFR BLD AUTO: 29.3 % (ref 34–46.6)
HGB BLD-MCNC: 9.3 G/DL (ref 12–15.9)
MCH RBC QN AUTO: 30.1 PG (ref 26.6–33)
MCHC RBC AUTO-ENTMCNC: 31.7 G/DL (ref 31.5–35.7)
MCV RBC AUTO: 94.8 FL (ref 79–97)
PLATELET # BLD AUTO: 303 10*3/MM3 (ref 140–450)
PMV BLD AUTO: 9.4 FL (ref 6–12)
POTASSIUM SERPL-SCNC: 4.1 MMOL/L (ref 3.5–5.2)
RBC # BLD AUTO: 3.09 10*6/MM3 (ref 3.77–5.28)
SODIUM SERPL-SCNC: 134 MMOL/L (ref 136–145)
WBC # BLD AUTO: 15.05 10*3/MM3 (ref 3.4–10.8)

## 2020-08-13 PROCEDURE — 94799 UNLISTED PULMONARY SVC/PX: CPT

## 2020-08-13 PROCEDURE — 85027 COMPLETE CBC AUTOMATED: CPT | Performed by: INTERNAL MEDICINE

## 2020-08-13 PROCEDURE — 82962 GLUCOSE BLOOD TEST: CPT

## 2020-08-13 PROCEDURE — 80048 BASIC METABOLIC PNL TOTAL CA: CPT | Performed by: INTERNAL MEDICINE

## 2020-08-13 PROCEDURE — G0378 HOSPITAL OBSERVATION PER HR: HCPCS

## 2020-08-13 PROCEDURE — 74176 CT ABD & PELVIS W/O CONTRAST: CPT

## 2020-08-13 PROCEDURE — 99219 PR INITIAL OBSERVATION CARE/DAY 50 MINUTES: CPT | Performed by: UROLOGY

## 2020-08-13 PROCEDURE — 25010000002 CEFTRIAXONE PER 250 MG: Performed by: INTERNAL MEDICINE

## 2020-08-13 RX ORDER — DEXTROSE MONOHYDRATE 25 G/50ML
25 INJECTION, SOLUTION INTRAVENOUS ONCE
Status: COMPLETED | OUTPATIENT
Start: 2020-08-13 | End: 2020-08-13

## 2020-08-13 RX ORDER — DEXTROSE MONOHYDRATE 25 G/50ML
INJECTION, SOLUTION INTRAVENOUS
Status: COMPLETED
Start: 2020-08-13 | End: 2020-08-13

## 2020-08-13 RX ADMIN — QUETIAPINE FUMARATE 25 MG: 25 TABLET ORAL at 20:09

## 2020-08-13 RX ADMIN — FERROUS SULFATE TAB 325 MG (65 MG ELEMENTAL FE) 325 MG: 325 (65 FE) TAB at 18:31

## 2020-08-13 RX ADMIN — DOCUSATE SODIUM 100 MG: 100 CAPSULE ORAL at 09:12

## 2020-08-13 RX ADMIN — ESCITALOPRAM 10 MG: 10 TABLET, FILM COATED ORAL at 09:12

## 2020-08-13 RX ADMIN — Medication 1 TABLET: at 18:31

## 2020-08-13 RX ADMIN — TIMOLOL MALEATE 1 DROP: 5 SOLUTION/ DROPS OPHTHALMIC at 20:10

## 2020-08-13 RX ADMIN — Medication 1 TABLET: at 09:12

## 2020-08-13 RX ADMIN — ASPIRIN 81 MG: 81 TABLET, CHEWABLE ORAL at 09:12

## 2020-08-13 RX ADMIN — MINERAL OIL 360 ML: 1000 LIQUID ORAL at 15:29

## 2020-08-13 RX ADMIN — FERROUS SULFATE TAB 325 MG (65 MG ELEMENTAL FE) 325 MG: 325 (65 FE) TAB at 09:12

## 2020-08-13 RX ADMIN — DEXTROSE MONOHYDRATE 25 ML: 25 INJECTION, SOLUTION INTRAVENOUS at 10:10

## 2020-08-13 RX ADMIN — SODIUM BICARBONATE: 84 INJECTION, SOLUTION INTRAVENOUS at 10:18

## 2020-08-13 RX ADMIN — BRINZOLAMIDE 1 DROP: 10 SUSPENSION/ DROPS OPHTHALMIC at 20:10

## 2020-08-13 RX ADMIN — BRIMONIDINE TARTRATE 1 DROP: 2 SOLUTION OPHTHALMIC at 20:10

## 2020-08-13 RX ADMIN — MEMANTINE HYDROCHLORIDE 5 MG: 5 TABLET, FILM COATED ORAL at 20:09

## 2020-08-13 RX ADMIN — BRINZOLAMIDE 1 DROP: 10 SUSPENSION/ DROPS OPHTHALMIC at 09:13

## 2020-08-13 RX ADMIN — BRIMONIDINE TARTRATE 1 DROP: 2 SOLUTION OPHTHALMIC at 09:13

## 2020-08-13 RX ADMIN — MEMANTINE HYDROCHLORIDE 5 MG: 5 TABLET, FILM COATED ORAL at 09:12

## 2020-08-13 RX ADMIN — DONEPEZIL HYDROCHLORIDE 10 MG: 10 TABLET, FILM COATED ORAL at 20:09

## 2020-08-13 RX ADMIN — TIMOLOL MALEATE 1 DROP: 5 SOLUTION/ DROPS OPHTHALMIC at 09:13

## 2020-08-13 RX ADMIN — ATORVASTATIN CALCIUM 20 MG: 10 TABLET, FILM COATED ORAL at 09:12

## 2020-08-13 RX ADMIN — CEFTRIAXONE SODIUM 1 G: 1 INJECTION, POWDER, FOR SOLUTION INTRAMUSCULAR; INTRAVENOUS at 14:41

## 2020-08-13 RX ADMIN — SODIUM CHLORIDE, PRESERVATIVE FREE 10 ML: 5 INJECTION INTRAVENOUS at 20:10

## 2020-08-13 NOTE — CONSULTS
Referring Provider: Almita  Reason for Consultation: UTI, staghorn stone    Chief complaint UTI    Subjective .     History of present illness:  89-year-old white female with UTI, leukocytosis and elevated serum creatinine. A CT exam performed today revealed right partial staghorn and hydronephrosis. Patient denies complaints of flank and abdominal pain, but she is a rather poor historian.    Review of Systems  Pertinent items are noted in HPI, all other systems reviewed and negative     History  Past Medical History:   Diagnosis Date   • Arthritis    • Dementia (CMS/HCC)    • Disease of thyroid gland    • Encephalopathy    • Hyperlipidemia    • Hypertension    • Hypothyroid        Past Surgical History:   Procedure Laterality Date   • HIP HEMIARTHROPLASTY Left 2/2/2018    Procedure: HIP HEMIARTHROPLASTY;  Surgeon: Emory Barger MD;  Location: [facility];  Service:    • TOTAL HIP ARTHROPLASTY Left 02/02/2018       History reviewed. No pertinent family history.    Social History     Socioeconomic History   • Marital status:      Spouse name: Not on file   • Number of children: Not on file   • Years of education: Not on file   • Highest education level: Not on file   Tobacco Use   • Smoking status: Never Smoker   • Smokeless tobacco: Never Used   Substance and Sexual Activity   • Alcohol use: No   • Drug use: No   • Sexual activity: Defer   Social History Narrative    ** Merged History Encounter **            Current Facility-Administered Medications   Medication Dose Route Frequency Provider Last Rate Last Dose   • acetaminophen (TYLENOL) tablet 650 mg  650 mg Oral Q6H PRN Forest Clayton MD       • aspirin chewable tablet 81 mg  81 mg Oral Daily Forest Clayton MD   81 mg at 08/13/20 0912   • atorvastatin (LIPITOR) tablet 20 mg  20 mg Oral Daily Forest Clayton MD   20 mg at 08/13/20 0912   • bisacodyl (DULCOLAX) EC tablet 5 mg  5 mg Oral Daily PRN Forest Clayton MD       • brimonidine (ALPHAGAN)  0.2 % ophthalmic solution 1 drop  1 drop Both Eyes BID Forest Clayton MD   1 drop at 08/13/20 0913   • brinzolamide (AZOPT) 1 % ophthalmic suspension 1 drop  1 drop Both Eyes TID Forest Clayton MD   1 drop at 08/13/20 0913   • calcium carb-cholecalciferol 600-800 MG-UNIT tablet 1 tablet  1 tablet Oral BID With Meals Edmund Recio DO       • cefTRIAXone (ROCEPHIN) 1 g/100 mL 0.9% NS (MBP)  1 g Intravenous Q24H Edmund Recio DO   Stopped at 08/12/20 1444   • docusate sodium (COLACE) capsule 100 mg  100 mg Oral Daily Forest Clayton MD   100 mg at 08/13/20 0912   • donepezil (ARICEPT) tablet 10 mg  10 mg Oral Nightly Forest Clayton MD   10 mg at 08/11/20 2045   • escitalopram (LEXAPRO) tablet 10 mg  10 mg Oral Daily Forest Clayton MD   10 mg at 08/13/20 0912   • ferrous sulfate tablet 325 mg  325 mg Oral BID With Meals Forest Clayton MD   325 mg at 08/13/20 0912   • HOG enema 360 mL  360 mL Rectal Once Edmund Recio DO       • HYDROcodone-acetaminophen (NORCO)  MG per tablet 1 tablet  1 tablet Oral Q4H PRN Forest Clayton MD   1 tablet at 08/11/20 1519   • levothyroxine (SYNTHROID, LEVOTHROID) tablet 137 mcg  137 mcg Oral Q AM Forest Clayton MD       • memantine (NAMENDA) tablet 5 mg  5 mg Oral BID Forest Clayton MD   5 mg at 08/13/20 0912   • multivitamin w/minerals tablet 1 tablet  1 tablet Oral Daily Forest Clayton MD   1 tablet at 08/13/20 0912   • ondansetron (ZOFRAN) tablet 4 mg  4 mg Oral Q6H PRN Forest Clayton MD        Or   • ondansetron (ZOFRAN) injection 4 mg  4 mg Intravenous Q6H PRN Forest Clayton MD       • polyethylene glycol (MIRALAX) packet 17 g  17 g Oral Daily PRN Forest Clayton MD       • QUEtiapine (SEROquel) tablet 25 mg  25 mg Oral Nightly Edmund Recio DO   25 mg at 08/11/20 2045   • sodium chloride 0.45 % 950 mL with sodium bicarbonate 8.4 % 50 mEq infusion   Intravenous Continuous Edmund Recio DO 60 mL/hr at 08/13/20 1304      • sodium chloride 0.9 % flush 10 mL  10 mL Intravenous Q12H Forest Clayton MD   10 mL at 08/11/20 0913   • sodium chloride 0.9 % flush 10 mL  10 mL Intravenous PRN Forest Clayton MD       • timolol (TIMOPTIC) 0.5 % ophthalmic solution 1 drop  1 drop Both Eyes Q12H Forest Clayton MD   1 drop at 08/13/20 0913        No Known Allergies    Objective     Vital Signs   Temp:  [97.9 °F (36.6 °C)-98.4 °F (36.9 °C)] 98.4 °F (36.9 °C)  Heart Rate:  [55-70] 63  Resp:  [16-18] 16  BP: (113-148)/(41-59) 121/41    Intake/Output Summary (Last 24 hours) at 8/13/2020 1357  Last data filed at 8/13/2020 1016  Gross per 24 hour   Intake 2318 ml   Output --   Net 2318 ml       Physical Exam:    General: Alert, cooperative, nontoxic, comfortable  Head:  Normocephalic, without obvious abnormality, atraumatic  Eyes:   Lids and lashes normal, no icterus, no pallor  Ears:  Ears appear intact with no abnormalities noted  Neck:  Supple  Back:  No costovertebral angle tenderness  Lungs: Unlabored respirations  Heart:  Regular rhythm and normal rate  Abdomen: Soft, non-tender, non-distended  :  No CVAT  Extremities: Moves all extremities well  Skin:  Warm and dry  Neurologic: Cranial nerves 2 - 12 grossly intact    Results Review:   I reviewed the patient's new clinical results.  I reviewed the patient's new imaging results and agree with the interpretation.      @[facility]@    @[facility]@    Imaging Results (Last 24 Hours)     Procedure Component Value Units Date/Time    CT Abdomen Pelvis Without Contrast [[id number]] Collected:  08/13/20 1151     Updated:  08/13/20 1222    Narrative:       EXAM: CT ABDOMEN PELVIS WO CONTRAST- - 8/13/2020 11:12 AM CDT     HISTORY: Persistent renal failure, UTI; N17.9-Acute kidney failure,  unspecified; D72.829-Elevated white blood cell count, unspecified;  N39.0-Urinary tract infection, site not specified; D64.9-Anemia,  unspecified       COMPARISON: 4/22/2013.      DOSE LENGTH PRODUCT: 310 mGy cm.  Automatic exposure control was utilized  to make radiation dose as low as reasonably achievable.     TECHNIQUE: Unenhanced axial images of the abdomen and pelvis obtained  with coronal and sagittal reformats.     FINDINGS: Evaluation limited secondary to lack of intravenous contrast  agent.      VISUALIZED CHEST: Small to moderate bilateral pleural effusions with  subjacent probable atelectasis. No pericardial effusion.     Motion limited exam.     LIVER: Normal hepatic contour.     BILIARY: Gallstones.     PANCREAS: Atrophic.     SPLEEN: Not enlarged.     ADRENAL: Normal appearance of the bilateral adrenal glands.     GENITOURINARY:   Right hydronephrosis with calcification layering in the right proximal  ureter measuring 14 x 6 mm. There are additional calculi in the right  lower collecting system, suggestive of staghorn calculus.   Urinary bladder is within normal limits.  There appears to be a coarse uterine calcifications suggesting fibroid.     PERITONEUM: Diffuse mesenteric edema without drainable ascites. No free  air.     GI TRACT: Normal configuration of the stomach and duodenum.   Fluid-filled loops of nondistended small bowel. Large ball of stool at  the rectum measures up to 7.1 cm. Normal appendix on axial images 43-48.     VESSELS: Aorta normal in course and caliber with calcified  atherosclerosis. Limited evaluation of vasculature without intravenous  contrast.     RETROPERITONEUM: No mass, lymphadenopathy or hemorrhage.     SOFT TISSUES: Body wall edema. Increased soft tissue density at the left  medial gluteal region suggesting subcutaneous ulceration.     BONES: Left hip fixation hardware. No acute or suspicious bony finding.  Probable hemangioma of L3, similar compared to 4/22/2013.          Impression:       1. Right hydronephrosis with proximal ureter calcification measuring 14  x 6 mm. Additional calculi at the right lower renal pelvis suggesting  staghorn type calculus.  2. Large ball of  stool at the colon measuring 7 cm, consider impaction.  Rectal wall does not appear currently thickened.  3. Fluid-filled loops of small bowel. Exam of the bowel wall is limited  due to motion. Recommend correlation with clinical presentation.  4. Gallstones.  5. Mesenteric edema, body wall edema and small to moderate bilateral  pleural effusions. Correlate with fluid status.  6. Left gluteal decubitus ulcer.  7. Calcified atherosclerosis.  This report was finalized on 08/13/2020 12:19 by Dr Gabriela Atkins MD.            Assessment/Plan       Acute renal failure superimposed on stage 3 chronic kidney disease (CMS/HCC)    Dementia with behavioral disturbance (CMS/HCC)    Essential hypertension    Urinary tract infection due to Klebsiella species    Volume depletion    Metabolic acidosis    Hypothyroidism (acquired)    Severe malnutrition (CMS/HCC)    Staghorn calculus    Hydronephrosis of right kidney    Impacted stool in rectum (CMS/HCC)      NPO at midnight. She can eat today if she wants.     Will order KUB for the morning. Will check BMP and CBC in the morning. Spoke with her son, Tirso Walter, by phone. She would be at risk for a general anesthetic given her age and dementia. If her clinical picture worsens, we may need to place a stent with subsequent surgery with ureteroscopy.     I discussed the patients findings and my recommendations with patient, family and primary care team    (Please note that portions of this note were completed with a voice recognition program.)    Carmine Stewart MD  08/13/20  13:57    Time: Time spent: 45 minutes spent performing evaluation and management, chart review, and discussion with patient, > 50% of time spent in face-to-face encounter

## 2020-08-13 NOTE — PROGRESS NOTES
Malnutrition Severity Assessment    Patient Name:  Andres Walter  YOB: 1928  MRN: [id number]  Admit Date:  8/10/2020    Patient meets criteria for : Severe Malnutrition    Comments:  If in agreement with malnutrition assessment, please attest documentation. Thanks.     Malnutrition Severity Assessment  Malnutrition Type: Chronic Disease - Related Malnutrition     Malnutrition Type (last 8 hours)      Malnutrition Severity Assessment     Row Name 08/13/20 1007       Malnutrition Severity Assessment    Malnutrition Type  Chronic Disease - Related Malnutrition    Row Name 08/13/20 1007       Muscle Loss    Loss of Muscle Mass Findings  Severe    Jainism Region  Severe - deep hollowing/scooping, lack of muscle to touch, facial bones well defined    Clavicle Bone Region  Severe - protruding prominent bone    Acromion Bone Region  Severe - squared shoulders, bones, and acromion process protrusion prominent    Dorsal Hand Region  Severe - prominent depression    Row Name 08/13/20 1007       Fat Loss    Subcutaneous Fat Loss Findings  Severe    Orbital Region   Severe - pronounced hollowness/depression, dark circles, loose saggy skin    Row Name 08/13/20 1007       Criteria Met (Must meet criteria for severity in at least 2 of these categories: M Wasting, Fat Loss, Fluid, Secondary Signs, Wt. Status, Intake)    Patient meets criteria for   Severe Malnutrition          Electronically signed by:  CHE Cleveland RDN  08/13/20 10:07

## 2020-08-13 NOTE — PROGRESS NOTES
[facility] Medicine Services  INPATIENT PROGRESS NOTE    Patient Name: Andres Walter  Date of Admission: 8/10/2020  Today's Date: 08/13/20  Length of Stay: 0  Primary Care Physician: Jerry Montgomery MD    Subjective   Chief Complaint: UTI, renal failure.   HPI   Hypoglycemic this morning too. Improved with eating and dextrose.     Her renal function had not improved as I would have liked it to.  Her white blood cell count is also persistently elevated.  She had Klebsiella on her urine culture that is susceptible to ceftriaxone.    I started to become concerned about the possibility of a urinary obstruction given the above.  CT scan of the abdomen and pelvis today shows a large right-sided staghorn calculus causing right-sided hydronephrosis.  Urology to be consulted.  I am not sure that they will be able to intervene on it.  This could quite possibly be a chronic process.    I called and spoke to her son this morning, Tirso Walter. He lives near [place], [place].  He understands the situation with the CT scan.  I told him that Dr. Stewart would likely be contacting him.    Review of Systems   Difficult with her dementia.    Objective    Temp:  [97.9 °F (36.6 °C)-98.4 °F (36.9 °C)] 98.4 °F (36.9 °C)  Heart Rate:  [55-70] 63  Resp:  [16-18] 16  BP: (113-148)/(41-59) 121/41  Physical Exam  Constitutional: Up in bed.  No distress.  Not agitated or anxious at present. No family present.  Seen and discussed with her nurse, Mary.   Head: Normocephalic and atraumatic.   Eyes: Pupils are equal, round, and reactive to light. Conjunctivae are normal.   Neck: Neck supple. No JVD present.   Cardiovascular: Normal rate, regular rhythm, normal heart sounds and intact distal pulses. Exam reveals no gallop and no friction rub. No murmur heard.  Pulmonary/Chest: Effort normal and breath sounds normal. No respiratory distress. She has no wheezes. She has no rales. She exhibits no tenderness.      Abdominal: Soft. Bowel sounds are normal. She exhibits no distension. There is no tenderness. There is no rebound and no guarding.   Musculoskeletal: Normal range of motion. She exhibits no edema, tenderness or deformity.   Neurological: She is alert. She displays normal reflexes. She exhibits normal muscle tone.   Skin: Skin is warm and dry. No rash noted.   Psychiatric: Pleasantly confused.     Results Review:  I have reviewed the labs, radiology results, and diagnostic studies.    Laboratory Data:   Results from last 7 days   Lab Units 08/13/20  0833 08/12/20  0559 08/11/20  0450   WBC 10*3/mm3 15.05* 11.30* 11.30*   HEMOGLOBIN g/dL 9.3* 7.6* 7.5*   HEMATOCRIT % 29.3* 23.4* 22.9*   PLATELETS 10*3/mm3 303 281 294     Results from last 7 days   Lab Units 08/13/20  0833 08/12/20  0559 08/11/20  0450  08/10/20  1753   SODIUM mmol/L 134* 135* 136  --  135*   POTASSIUM mmol/L 4.1 3.7 3.9   < > 4.9   CHLORIDE mmol/L 99 104 110*  --  108*   CO2 mmol/L 20.0* 23.0 18.0*  --  17.0*   BUN mg/dL 26* 23 25*  --  27*   CREATININE mg/dL 1.80* 1.92* 1.99*  --  2.21*   CALCIUM mg/dL 7.3* 7.2* 7.2*  --  8.0*   BILIRUBIN mg/dL  --   --  0.2  --  0.3   ALK PHOS U/L  --   --  93  --  122*   ALT (SGPT) U/L  --   --  19  --  29   AST (SGOT) U/L  --   --  20  --  38*   GLUCOSE mg/dL 39* 80 116*  --  97    < > = values in this interval not displayed.     Culture Data:   Urine Culture   Date Value Ref Range Status   08/10/2020 (A)  Final    >100,000 CFU/mL Klebsiella pneumoniae ssp pneumoniae     Radiology Data:   Imaging Results (Last 24 Hours)     Procedure Component Value Units Date/Time    CT Abdomen Pelvis Without Contrast [[id number]] Collected:  08/13/20 1151     Updated:  08/13/20 1222    Narrative:       EXAM: CT ABDOMEN PELVIS WO CONTRAST- - 8/13/2020 11:12 AM CDT     HISTORY: Persistent renal failure, UTI; N17.9-Acute kidney failure,  unspecified; D72.829-Elevated white blood cell count, unspecified;  N39.0-Urinary tract  infection, site not specified; D64.9-Anemia,  unspecified       COMPARISON: 4/22/2013.      DOSE LENGTH PRODUCT: 310 mGy cm. Automatic exposure control was utilized  to make radiation dose as low as reasonably achievable.     TECHNIQUE: Unenhanced axial images of the abdomen and pelvis obtained  with coronal and sagittal reformats.     FINDINGS: Evaluation limited secondary to lack of intravenous contrast  agent.      VISUALIZED CHEST: Small to moderate bilateral pleural effusions with  subjacent probable atelectasis. No pericardial effusion.     Motion limited exam.     LIVER: Normal hepatic contour.     BILIARY: Gallstones.     PANCREAS: Atrophic.     SPLEEN: Not enlarged.     ADRENAL: Normal appearance of the bilateral adrenal glands.     GENITOURINARY:   Right hydronephrosis with calcification layering in the right proximal  ureter measuring 14 x 6 mm. There are additional calculi in the right  lower collecting system, suggestive of staghorn calculus.   Urinary bladder is within normal limits.  There appears to be a coarse uterine calcifications suggesting fibroid.     PERITONEUM: Diffuse mesenteric edema without drainable ascites. No free  air.     GI TRACT: Normal configuration of the stomach and duodenum.   Fluid-filled loops of nondistended small bowel. Large ball of stool at  the rectum measures up to 7.1 cm. Normal appendix on axial images 43-48.     VESSELS: Aorta normal in course and caliber with calcified  atherosclerosis. Limited evaluation of vasculature without intravenous  contrast.     RETROPERITONEUM: No mass, lymphadenopathy or hemorrhage.     SOFT TISSUES: Body wall edema. Increased soft tissue density at the left  medial gluteal region suggesting subcutaneous ulceration.     BONES: Left hip fixation hardware. No acute or suspicious bony finding.  Probable hemangioma of L3, similar compared to 4/22/2013.          Impression:       1. Right hydronephrosis with proximal ureter calcification  measuring 14  x 6 mm. Additional calculi at the right lower renal pelvis suggesting  staghorn type calculus.  2. Large ball of stool at the colon measuring 7 cm, consider impaction.  Rectal wall does not appear currently thickened.  3. Fluid-filled loops of small bowel. Exam of the bowel wall is limited  due to motion. Recommend correlation with clinical presentation.  4. Gallstones.  5. Mesenteric edema, body wall edema and small to moderate bilateral  pleural effusions. Correlate with fluid status.  6. Left gluteal decubitus ulcer.  7. Calcified atherosclerosis.  This report was finalized on 08/13/2020 12:19 by Dr Gabriela Atkins MD.        I have reviewed the patient's current medications.     Assessment/Plan     Active Hospital Problems    Diagnosis   • **Acute renal failure superimposed on stage 3 chronic kidney disease (CMS/HCC)   • Metabolic acidosis   • Staghorn calculus   • Hydronephrosis of right kidney   • Urinary tract infection due to Klebsiella species   • Volume depletion   • Severe malnutrition (CMS/HCC)   • Impacted stool in rectum (CMS/HCC)   • Dementia with behavioral disturbance (CMS/HCC)   • Essential hypertension   • Hypothyroidism (acquired)     Plan:  The patient was admitted on 8/10 by Dr. Clayton.  She presented to the emergency department from her skilled nursing facility with abnormal labs.  She had outpatient labs and found her to be in worsening renal failure.  There are also concerns that she has just been declining in general way.  She has dementia and has been more combative and agitated than usual.  She was also felt to have a urinary tract infection.     She is being treated with ceftriaxone.  Urine culture shows Klebsiella susceptible to ceftriaxone.     Renal function improved with hydration, but had stalled. Able to void, incontinent. Changed the composition of her fluids to include bicarbonate on 8/11.  She has a history of CKD, stage III.  Her most recent serum creatinine in  our facility was 1.02 in February 2019.    Given her persistent renal failure and persistent leukocytosis, I decided to obtain a CT scan abdomen pelvis this morning that shows right-sided hydronephrosis with a large staghorn calculus.  Urology to be consulted for an opinion.  I am not sure that they will be able to effectively intervene on this.  This could quite possibly even be a chronic process.    Nursing aware of the stool impaction.  Enema and disimpaction.     Levothyroxine increased slightly secondary to under suppressed TSH.     Other home medications reconciled and resumed as appropriate.  Started low-dose Seroquel at night given her problems with combativeness and agitation. Continue Namenda and Aricept.     Tolerating a regular diet per nursing. Supplements added by nutrition. Will make NPO until urology evaluates.      SCDs for DVT prophylaxis.     Discharge Planning: Lives at [facility].   Discharge pending urologic opinion.     Electronically signed by Edmund Recio DO, 08/13/20, 12:28.

## 2020-08-13 NOTE — PLAN OF CARE
"  Problem: Patient Care Overview  Goal: Plan of Care Review  Outcome: Ongoing (interventions implemented as appropriate)  Flowsheets (Taken 8/13/2020 0422)  Progress: no change  Plan of Care Reviewed With: patient  Outcome Summary: Patient refused medication this shift stating, \"Leave me alone.  I'm sick of this.\"  See previous note regarding bedside renal ultrasound.  Patient refusing turns most of the time, as well.  IV fluids continue.  VSS.  SB-NSR 53-62 on tele.  Safety maintained; bed alarm in place.  Will continue to monitor.  Likely dc to [facility] today.  Please call son when patient departs.     "

## 2020-08-14 ENCOUNTER — ANESTHESIA EVENT (OUTPATIENT)
Dept: PERIOP | Facility: HOSPITAL | Age: 85
End: 2020-08-14

## 2020-08-14 ENCOUNTER — APPOINTMENT (OUTPATIENT)
Dept: GENERAL RADIOLOGY | Facility: HOSPITAL | Age: 85
End: 2020-08-14

## 2020-08-14 ENCOUNTER — ANESTHESIA (OUTPATIENT)
Dept: PERIOP | Facility: HOSPITAL | Age: 85
End: 2020-08-14

## 2020-08-14 LAB
ANION GAP SERPL CALCULATED.3IONS-SCNC: 7 MMOL/L (ref 5–15)
BUN SERPL-MCNC: 26 MG/DL (ref 8–23)
BUN/CREAT SERPL: 13 (ref 7–25)
CALCIUM SPEC-SCNC: 7.5 MG/DL (ref 8.2–9.6)
CHLORIDE SERPL-SCNC: 100 MMOL/L (ref 98–107)
CO2 SERPL-SCNC: 27 MMOL/L (ref 22–29)
CREAT SERPL-MCNC: 2 MG/DL (ref 0.57–1)
DEPRECATED RDW RBC AUTO: 46.8 FL (ref 37–54)
ERYTHROCYTE [DISTWIDTH] IN BLOOD BY AUTOMATED COUNT: 13.7 % (ref 12.3–15.4)
GFR SERPL CREATININE-BSD FRML MDRD: 23 ML/MIN/1.73
GFR SERPL CREATININE-BSD FRML MDRD: 28 ML/MIN/1.73
GLUCOSE SERPL-MCNC: 118 MG/DL (ref 65–99)
HCT VFR BLD AUTO: 25.4 % (ref 34–46.6)
HGB BLD-MCNC: 8.4 G/DL (ref 12–15.9)
MAGNESIUM SERPL-MCNC: 1.9 MG/DL (ref 1.7–2.3)
MCH RBC QN AUTO: 30.3 PG (ref 26.6–33)
MCHC RBC AUTO-ENTMCNC: 33.1 G/DL (ref 31.5–35.7)
MCV RBC AUTO: 91.7 FL (ref 79–97)
PHOSPHATE SERPL-MCNC: 2.3 MG/DL (ref 2.5–4.5)
PLATELET # BLD AUTO: 320 10*3/MM3 (ref 140–450)
PMV BLD AUTO: 9.2 FL (ref 6–12)
POTASSIUM SERPL-SCNC: 3.7 MMOL/L (ref 3.5–5.2)
RBC # BLD AUTO: 2.77 10*6/MM3 (ref 3.77–5.28)
SODIUM SERPL-SCNC: 134 MMOL/L (ref 136–145)
WBC # BLD AUTO: 13.59 10*3/MM3 (ref 3.4–10.8)

## 2020-08-14 PROCEDURE — 74420 UROGRAPHY RTRGR +-KUB: CPT

## 2020-08-14 PROCEDURE — 52351 CYSTOURETERO & OR PYELOSCOPE: CPT | Performed by: UROLOGY

## 2020-08-14 PROCEDURE — 25010000002 IOPAMIDOL 61 % SOLUTION: Performed by: UROLOGY

## 2020-08-14 PROCEDURE — C2617 STENT, NON-COR, TEM W/O DEL: HCPCS | Performed by: UROLOGY

## 2020-08-14 PROCEDURE — 83735 ASSAY OF MAGNESIUM: CPT | Performed by: INTERNAL MEDICINE

## 2020-08-14 PROCEDURE — 99024 POSTOP FOLLOW-UP VISIT: CPT | Performed by: UROLOGY

## 2020-08-14 PROCEDURE — 85027 COMPLETE CBC AUTOMATED: CPT | Performed by: INTERNAL MEDICINE

## 2020-08-14 PROCEDURE — C1758 CATHETER, URETERAL: HCPCS | Performed by: UROLOGY

## 2020-08-14 PROCEDURE — 25010000002 SUCCINYLCHOLINE PER 20 MG: Performed by: NURSE ANESTHETIST, CERTIFIED REGISTERED

## 2020-08-14 PROCEDURE — 25010000002 PROPOFOL 10 MG/ML EMULSION: Performed by: NURSE ANESTHETIST, CERTIFIED REGISTERED

## 2020-08-14 PROCEDURE — 84100 ASSAY OF PHOSPHORUS: CPT | Performed by: INTERNAL MEDICINE

## 2020-08-14 PROCEDURE — 25010000002 LEVOFLOXACIN PER 250 MG: Performed by: UROLOGY

## 2020-08-14 PROCEDURE — 74018 RADEX ABDOMEN 1 VIEW: CPT

## 2020-08-14 PROCEDURE — 80048 BASIC METABOLIC PNL TOTAL CA: CPT | Performed by: INTERNAL MEDICINE

## 2020-08-14 PROCEDURE — C1769 GUIDE WIRE: HCPCS | Performed by: UROLOGY

## 2020-08-14 RX ORDER — SODIUM CHLORIDE 0.9 % (FLUSH) 0.9 %
3-10 SYRINGE (ML) INJECTION AS NEEDED
Status: DISCONTINUED | OUTPATIENT
Start: 2020-08-14 | End: 2020-08-14 | Stop reason: HOSPADM

## 2020-08-14 RX ORDER — FENTANYL CITRATE 50 UG/ML
25 INJECTION, SOLUTION INTRAMUSCULAR; INTRAVENOUS
Status: DISCONTINUED | OUTPATIENT
Start: 2020-08-14 | End: 2020-08-14 | Stop reason: HOSPADM

## 2020-08-14 RX ORDER — MAGNESIUM HYDROXIDE 1200 MG/15ML
LIQUID ORAL AS NEEDED
Status: DISCONTINUED | OUTPATIENT
Start: 2020-08-14 | End: 2020-08-14 | Stop reason: HOSPADM

## 2020-08-14 RX ORDER — LIDOCAINE HYDROCHLORIDE 40 MG/ML
SOLUTION TOPICAL AS NEEDED
Status: DISCONTINUED | OUTPATIENT
Start: 2020-08-14 | End: 2020-08-14 | Stop reason: SURG

## 2020-08-14 RX ORDER — LEVOFLOXACIN 5 MG/ML
500 INJECTION, SOLUTION INTRAVENOUS
Status: COMPLETED | OUTPATIENT
Start: 2020-08-14 | End: 2020-08-14

## 2020-08-14 RX ORDER — SODIUM CHLORIDE 0.9 % (FLUSH) 0.9 %
3 SYRINGE (ML) INJECTION EVERY 12 HOURS SCHEDULED
Status: DISCONTINUED | OUTPATIENT
Start: 2020-08-14 | End: 2020-08-14 | Stop reason: HOSPADM

## 2020-08-14 RX ORDER — LABETALOL HYDROCHLORIDE 5 MG/ML
5 INJECTION, SOLUTION INTRAVENOUS
Status: DISCONTINUED | OUTPATIENT
Start: 2020-08-14 | End: 2020-08-14 | Stop reason: HOSPADM

## 2020-08-14 RX ORDER — LIDOCAINE HYDROCHLORIDE 10 MG/ML
0.5 INJECTION, SOLUTION EPIDURAL; INFILTRATION; INTRACAUDAL; PERINEURAL ONCE AS NEEDED
Status: DISCONTINUED | OUTPATIENT
Start: 2020-08-14 | End: 2020-08-14 | Stop reason: HOSPADM

## 2020-08-14 RX ORDER — SODIUM CHLORIDE, SODIUM LACTATE, POTASSIUM CHLORIDE, CALCIUM CHLORIDE 600; 310; 30; 20 MG/100ML; MG/100ML; MG/100ML; MG/100ML
100 INJECTION, SOLUTION INTRAVENOUS CONTINUOUS
Status: DISCONTINUED | OUTPATIENT
Start: 2020-08-14 | End: 2020-08-14

## 2020-08-14 RX ORDER — SUCCINYLCHOLINE CHLORIDE 20 MG/ML
INJECTION INTRAMUSCULAR; INTRAVENOUS AS NEEDED
Status: DISCONTINUED | OUTPATIENT
Start: 2020-08-14 | End: 2020-08-14 | Stop reason: SURG

## 2020-08-14 RX ORDER — NALOXONE HCL 0.4 MG/ML
0.4 VIAL (ML) INJECTION AS NEEDED
Status: DISCONTINUED | OUTPATIENT
Start: 2020-08-14 | End: 2020-08-14 | Stop reason: HOSPADM

## 2020-08-14 RX ORDER — SODIUM CHLORIDE, SODIUM LACTATE, POTASSIUM CHLORIDE, CALCIUM CHLORIDE 600; 310; 30; 20 MG/100ML; MG/100ML; MG/100ML; MG/100ML
30 INJECTION, SOLUTION INTRAVENOUS CONTINUOUS
Status: DISCONTINUED | OUTPATIENT
Start: 2020-08-14 | End: 2020-08-14

## 2020-08-14 RX ORDER — ONDANSETRON 2 MG/ML
4 INJECTION INTRAMUSCULAR; INTRAVENOUS ONCE AS NEEDED
Status: DISCONTINUED | OUTPATIENT
Start: 2020-08-14 | End: 2020-08-14 | Stop reason: HOSPADM

## 2020-08-14 RX ORDER — EPHEDRINE SULFATE 50 MG/ML
INJECTION, SOLUTION INTRAVENOUS AS NEEDED
Status: DISCONTINUED | OUTPATIENT
Start: 2020-08-14 | End: 2020-08-14 | Stop reason: SURG

## 2020-08-14 RX ORDER — FLUMAZENIL 0.1 MG/ML
0.2 INJECTION INTRAVENOUS AS NEEDED
Status: DISCONTINUED | OUTPATIENT
Start: 2020-08-14 | End: 2020-08-14 | Stop reason: HOSPADM

## 2020-08-14 RX ORDER — PROPOFOL 10 MG/ML
VIAL (ML) INTRAVENOUS AS NEEDED
Status: DISCONTINUED | OUTPATIENT
Start: 2020-08-14 | End: 2020-08-14 | Stop reason: SURG

## 2020-08-14 RX ORDER — ROCURONIUM BROMIDE 10 MG/ML
INJECTION, SOLUTION INTRAVENOUS AS NEEDED
Status: DISCONTINUED | OUTPATIENT
Start: 2020-08-14 | End: 2020-08-14 | Stop reason: SURG

## 2020-08-14 RX ADMIN — LIDOCAINE HYDROCHLORIDE 40 MG: 20 INJECTION, SOLUTION INTRAVENOUS at 11:07

## 2020-08-14 RX ADMIN — LEVOTHYROXINE SODIUM 137 MCG: 137 TABLET ORAL at 06:50

## 2020-08-14 RX ADMIN — QUETIAPINE FUMARATE 25 MG: 25 TABLET ORAL at 21:06

## 2020-08-14 RX ADMIN — BRIMONIDINE TARTRATE 1 DROP: 2 SOLUTION OPHTHALMIC at 21:06

## 2020-08-14 RX ADMIN — SUCCINYLCHOLINE CHLORIDE 80 MG: 20 INJECTION, SOLUTION INTRAMUSCULAR; INTRAVENOUS at 11:07

## 2020-08-14 RX ADMIN — SODIUM CHLORIDE, PRESERVATIVE FREE 10 ML: 5 INJECTION INTRAVENOUS at 22:09

## 2020-08-14 RX ADMIN — VASOPRESSIN 1 ML: 20 INJECTION INTRAVENOUS at 11:09

## 2020-08-14 RX ADMIN — MEMANTINE HYDROCHLORIDE 5 MG: 5 TABLET, FILM COATED ORAL at 21:06

## 2020-08-14 RX ADMIN — LEVOFLOXACIN 500 MG: 5 INJECTION, SOLUTION INTRAVENOUS at 11:13

## 2020-08-14 RX ADMIN — DONEPEZIL HYDROCHLORIDE 10 MG: 10 TABLET, FILM COATED ORAL at 21:06

## 2020-08-14 RX ADMIN — PROPOFOL 70 MG: 10 INJECTION, EMULSION INTRAVENOUS at 11:07

## 2020-08-14 RX ADMIN — BRINZOLAMIDE 1 DROP: 10 SUSPENSION/ DROPS OPHTHALMIC at 21:06

## 2020-08-14 RX ADMIN — LIDOCAINE HYDROCHLORIDE 1 EACH: 40 SOLUTION TOPICAL at 11:07

## 2020-08-14 RX ADMIN — TIMOLOL MALEATE 1 DROP: 5 SOLUTION/ DROPS OPHTHALMIC at 21:06

## 2020-08-14 RX ADMIN — SODIUM CHLORIDE, POTASSIUM CHLORIDE, SODIUM LACTATE AND CALCIUM CHLORIDE 30 ML/HR: 600; 310; 30; 20 INJECTION, SOLUTION INTRAVENOUS at 10:58

## 2020-08-14 RX ADMIN — ROCURONIUM BROMIDE 5 MG: 10 INJECTION INTRAVENOUS at 11:07

## 2020-08-14 RX ADMIN — ACETAMINOPHEN 650 MG: 325 TABLET, FILM COATED ORAL at 21:06

## 2020-08-14 RX ADMIN — EPHEDRINE SULFATE 25 MG: 50 INJECTION INTRAVENOUS at 11:13

## 2020-08-14 NOTE — PROGRESS NOTES
Continued Stay Note   Martir     Patient Name: Andres Walter  MRN: [id number]  Today's Date: 8/14/2020    Admit Date: 8/10/2020    Discharge Plan     Row Name 08/14/20 0858       Plan    Plan  [facility]    Patient/Family in Agreement with Plan  yes    Plan Comments  Pt will return to [facility] at d/c.  Will follow. [phone]        Discharge Codes    No documentation.             REYMUNDO Mojica

## 2020-08-14 NOTE — OP NOTE
CYSTOSCOPY RETROGRADE PYELOGRAM AND STENT INSERTION, URETEROSCOPY LASER LITHOTRIPSY WITH STENT INSERTION  Procedure Note    Andres Walter  8/14/2020    Pre-op Diagnosis:   Staghorn calculus [N20.0]    Post-op Diagnosis:     Post-Op Diagnosis Codes:     * Staghorn calculus [N20.0]    Procedure/CPT® Codes:      Procedure(s):  CYSTOSCOPY with right RETROGRADE PYELOGRAM     Surgeon(s):  Carmine Stewart MD    Anesthesia: General    Staff:   Circulator: Chantale Sepulveda RN  Scrub Person: Briana Mcgee  Assistant: José Antonio Collins    Estimated Blood Loss: minimal    Specimens:                None      Drains: * No LDAs found *    Findings: There was an impacted right proximal ureteral stone.  I was unable to pass either a 6 Croatian or a 4.8 Croatian stent.  There was extravasation of contrast noted on retrograde pyelogram.    Complications: Extravasation of contrast    Indications: Right proximal ureteral stone and right kidney stone with worsening serum creatinine    Description of Procedure:     The patient was greeted in the holding area.  We discussed the procedure including risks, benefits, alternatives and complications.  All questions were answered.  Informed consent was obtained.    Preoperative antibiotic was administered.  The patient was transported back to the operating suite.  General anesthesia was set followed by airway management.  Patient was prepped and sterilely draped in dorsal lithotomy position.  A formal timeout was performed.    I proceeded with rigid cystourethroscopy.  Normal urethra and bladder.  The ureteral orifice ease were orthotopic.  I found the right ureteral orifice and cannulated with a 5 Croatian open-ended catheter.  I advanced the 5 Croatian proximally 4 to 5 cm into the ureter.  I then performed retrograde pyelogram.  I injected 10 to 15 cc of diluted contrast material slowly.  The proximal ureter was quite tortuous and there were filling defects within the proximal ureter.  The  filling phase was very slow as it did appear that there was some resistance to my injecting contrast.  The contrast within the upper tract was spotty at best and was difficult to tell if it was actually within the collecting system or if there was venous backflow at this point.  And the wire would not really advance into what we would presume to be the renal pelvis.  At this point I advanced the 5 Burundian up and in a combination of advancing the 5 Burundian and advancing the wire it did appear that we were able to get the wire within the renal pelvis.  I advanced a 5 Burundian and although there was a little bit of resistance it did seem to want to be able to go past the tortuous area of the proximal ureter and into the renal pelvis.  I removed the wire and performed another retrograde pyelogram at this point.  I confirmed that the 5 Burundian tip was within the renal pelvis as I was slowly injecting contrast the renal pelvis filled immediately and had good outline and then started to fill the calyces when there was some contrast extravasating noted at this point.  I stopped performing the retrograde pyelogram immediately.  I replaced the wire knowing that it was within the renal pelvis and back to the 5 Burundian open-ended catheter off of the wire.  It seemed that the length of the 5 Burundian that was within the ureter was anywhere between 28 and 30 cm given the tortuosity.  I selected a 6 Burundian by 28 cm stent and attempted to advance this over the wire however this stent would not pass the impacted proximal ureteral area.  I removed the stent.  I replaced the 5 Burundian into the ureter and advanced the wire up once again and again was confident that the wire was if it was within the renal pelvis.  I then tried a 4.8 Burundian by 28 cm stent but this also would not pass this impacted area.  In fact there was a looping of the stent within the mid ureter.  I remove the stent and deemed stent placement cystoscopically as unsuccessful.   I placed an 18 English Cotton catheter so that the bladder was drained appropriately.  The patient was awoken from the procedure extubated and transported in stable condition to the PACU.    The  patient was awoken from the procedure and transported in stable condition to the PACU.    Disposition: The patient will be transferred furred back to the floor.    Carmine Stewart MD     Date: 8/14/2020  Time: 11:49

## 2020-08-14 NOTE — ANESTHESIA POSTPROCEDURE EVALUATION
"Patient: Andres Walter    Procedure Summary     Date:  08/14/20 Room / Location:   [facility]  /  [facility]    Anesthesia Start:  1103 Anesthesia Stop:  1159    Procedures:       CYSTOSCOPY RETROGRADE PYELOGRAM AND STENT INSERTION (Right Bladder)      URETEROSCOPY LASER LITHOTRIPSY WITH STENT INSERTION (Right Ureter) Diagnosis:       Staghorn calculus      (Staghorn calculus [N20.0])    Surgeon:  Carmine Stewart MD Provider:  Jose Francisco Galvin CRNA    Anesthesia Type:  general ASA Status:  3 - Emergent          Anesthesia Type: general    Vitals  Vitals Value Taken Time   /53 8/14/2020 12:33 PM   Temp 97.6 °F (36.4 °C) 8/14/2020 12:28 PM   Pulse 82 8/14/2020 12:34 PM   Resp 14 8/14/2020 12:28 PM   SpO2 93 % 8/14/2020 12:32 PM   Vitals shown include unvalidated device data.        Post Anesthesia Care and Evaluation    PONV Status: none  Comments: Patient d/c from PACU prior to anes eval based on Lauryn score.  Please see RN notes for details of d/c criteria.    Blood pressure 114/40, pulse 89, temperature 97.6 °F (36.4 °C), temperature source Oral, resp. rate 16, height 160 cm (63\"), weight 43.8 kg (96 lb 8 oz), SpO2 95 %, not currently breastfeeding.          "

## 2020-08-14 NOTE — ANESTHESIA PREPROCEDURE EVALUATION
Anesthesia Evaluation     Patient summary reviewed   no history of anesthetic complications:  NPO Solid Status: > 8 hours  NPO Liquid Status: Waived due to emergency           Airway   No difficulty expected  Comment: unabble to examine, patient non compliant  Dental      Pulmonary    (-) asthma, sleep apnea, not a smoker  Cardiovascular     ECG reviewed  Patient on routine beta blocker and Beta blocker given within 24 hours of surgery    (+) hypertension, dysrhythmias, hyperlipidemia,       Neuro/Psych  (+) dementia, poor historian.,     (-) seizures, TIA, CVA  GI/Hepatic/Renal/Endo    (+)   renal disease CRI and stones,   (-) liver disease, diabetes    Musculoskeletal     Abdominal    Substance History      OB/GYN          Other                          Anesthesia Plan    ASA 3 - emergent     general   (Chart review, no family at bedside)  intravenous induction

## 2020-08-14 NOTE — ANESTHESIA PROCEDURE NOTES
Airway  Urgency: elective    Date/Time: 8/14/2020 11:08 AM  Airway not difficult    General Information and Staff    Patient location during procedure: OR  CRNA: Jose Francisco Galvin CRNA    Indications and Patient Condition  Indications for airway management: airway protection    Preoxygenated: yes  Mask difficulty assessment: 1 - vent by mask    Final Airway Details  Final airway type: endotracheal airway      Successful airway: ETT  Cuffed: yes   Successful intubation technique: direct laryngoscopy  Endotracheal tube insertion site: oral  Blade: Stanley  Blade size: 2  ETT size (mm): 7.0  Cormack-Lehane Classification: grade I - full view of glottis  Placement verified by: capnometry   Measured from: lips  Number of attempts at approach: 1  Assessment: lips, teeth, and gum same as pre-op and atraumatic intubation    Additional Comments  Dentition remain as preop.. Lower teeth are loose

## 2020-08-14 NOTE — PLAN OF CARE
Pt resting after procedure vss tel on f/c inserted per urologist pink tinged urine output ivf et iv antibiotics

## 2020-08-14 NOTE — PLAN OF CARE
Problem: Patient Care Overview  Goal: Plan of Care Review  Outcome: Ongoing (interventions implemented as appropriate)  Note:   VSS, no c/o of pain; disoriented to place, time, and situation; Iv fluids continue; turned Q2, incontinent care provided x2 with barrier cream applied; pt refusing labs this AM, they are going to come try again later; KUB to be done this AM; incontinent of bowels and bladder, had 2 Bms; elbows swollen, kept propped up on pillows; turned; safety maintained, bed alarm set; will continue to monitor      Problem: Hospitalized Acutely Ill Older (Adult)  Goal: Signs and Symptoms of Listed Potential Problems Will be Absent, Minimized or Managed (Hospitalized Acutely Ill Older)  Outcome: Ongoing (interventions implemented as appropriate)     Problem: Urinary Tract Infection (Adult)  Goal: Signs and Symptoms of Listed Potential Problems Will be Absent, Minimized or Managed (Urinary Tract Infection)  Outcome: Ongoing (interventions implemented as appropriate)     Problem: Renal Failure/Kidney Injury, Acute (Adult)  Goal: Signs and Symptoms of Listed Potential Problems Will be Absent, Minimized or Managed (Renal Failure/Kidney Injury, Acute)  Outcome: Ongoing (interventions implemented as appropriate)     Problem: Fall Risk (Adult)  Goal: Absence of Fall  Outcome: Ongoing (interventions implemented as appropriate)     Problem: Skin Injury Risk (Adult)  Goal: Skin Health and Integrity  Outcome: Ongoing (interventions implemented as appropriate)     Problem: Patient Care Overview  Goal: Individualization and Mutuality  Outcome: Ongoing (interventions implemented as appropriate)     Problem: Patient Care Overview  Goal: Discharge Needs Assessment  Outcome: Ongoing (interventions implemented as appropriate)     Problem: Patient Care Overview  Goal: Interprofessional Rounds/Family Conf  Outcome: Ongoing (interventions implemented as appropriate)

## 2020-08-14 NOTE — PROGRESS NOTES
LOS: 0 days   Patient Care Team:  Jerry Montgomery MD as PCP - General (Internal Medicine)    Chief Complaint:  Right ureter stone    Subjective     Interval History:     Patient Reports: The patient reports that she is doing well  Patient Denies: Denies flank pain, denies nausea, denies fevers, chills and rigors.  History taken from: patient family    Review of Systems  Pertinent items are noted in HPI, all other systems reviewed and negative     Objective     Vital Signs  Temp:  [98.1 °F (36.7 °C)-98.4 °F (36.9 °C)] 98.1 °F (36.7 °C)  Heart Rate:  [63-77] 64  Resp:  [16] 16  BP: (113-145)/(41-59) 124/52    Physical Exam:  Physical Exam     Results Review:       Lab Results (last 72 hours)     Procedure Component Value Units Date/Time    Phosphorus [[id number]]  (Abnormal) Collected:  08/14/20 0650    Specimen:  Blood Updated:  08/14/20 0711     Phosphorus 2.3 mg/dL     Magnesium [[id number]]  (Normal) Collected:  08/14/20 0650    Specimen:  Blood Updated:  08/14/20 0711     Magnesium 1.9 mg/dL     Basic Metabolic Panel [[id number]]  (Abnormal) Collected:  08/14/20 0650    Specimen:  Blood Updated:  08/14/20 0711     Glucose 118 mg/dL      BUN 26 mg/dL      Creatinine 2.00 mg/dL      Sodium 134 mmol/L      Potassium 3.7 mmol/L      Chloride 100 mmol/L      CO2 27.0 mmol/L      Calcium 7.5 mg/dL      eGFR  African Amer 28 mL/min/1.73      eGFR Non African Amer 23 mL/min/1.73      BUN/Creatinine Ratio 13.0     Anion Gap 7.0 mmol/L     Narrative:       GFR Normal >60  Chronic Kidney Disease <60  Kidney Failure <15      CBC (No Diff) [[id number]]  (Abnormal) Collected:  08/14/20 0650    Specimen:  Blood Updated:  08/14/20 0658     WBC 13.59 10*3/mm3      RBC 2.77 10*6/mm3      Hemoglobin 8.4 g/dL      Hematocrit 25.4 %      MCV 91.7 fL      MCH 30.3 pg      MCHC 33.1 g/dL      RDW 13.7 %      RDW-SD 46.8 fl      MPV 9.2 fL      Platelets 320 10*3/mm3     POC Glucose Once [[id number]]  (Abnormal) Collected:  08/13/20 0692     Specimen:  Blood Updated:  08/13/20 2214     Glucose 275 mg/dL      Comment: : [id number] Rhoan AckermanMeter ID: [id number]       POC Glucose Once [[id number]]  (Normal) Collected:  08/13/20 1023    Specimen:  Blood Updated:  08/13/20 1035     Glucose 93 mg/dL      Comment: : [id number] Vladimir FonsecaineMeter ID: [id number]       POC Glucose Once [[id number]]  (Abnormal) Collected:  08/13/20 1000    Specimen:  Blood Updated:  08/13/20 1011     Glucose 52 mg/dL      Comment: : [id number] Vladimir FonsecaineMeter ID: [id number]       POC Glucose Once [[id number]]  (Abnormal) Collected:  08/13/20 0943    Specimen:  Blood Updated:  08/13/20 0954     Glucose 54 mg/dL      Comment: : [id number] Vladimir FonsecaineMeter ID: [id number]       Basic Metabolic Panel [[id number]]  (Abnormal) Collected:  08/13/20 0833    Specimen:  Blood Updated:  08/13/20 0918     Glucose 39 mg/dL      BUN 26 mg/dL      Creatinine 1.80 mg/dL      Sodium 134 mmol/L      Potassium 4.1 mmol/L      Comment: Specimen hemolyzed.  Results may be affected.        Chloride 99 mmol/L      CO2 20.0 mmol/L      Calcium 7.3 mg/dL      eGFR  African Amer 32 mL/min/1.73      eGFR Non African Amer 26 mL/min/1.73      BUN/Creatinine Ratio 14.4     Anion Gap 15.0 mmol/L     Narrative:       GFR Normal >60  Chronic Kidney Disease <60  Kidney Failure <15      CBC (No Diff) [[id number]]  (Abnormal) Collected:  08/13/20 0833    Specimen:  Blood Updated:  08/13/20 0901     WBC 15.05 10*3/mm3      RBC 3.09 10*6/mm3      Hemoglobin 9.3 g/dL      Hematocrit 29.3 %      MCV 94.8 fL      MCH 30.1 pg      MCHC 31.7 g/dL      RDW 14.1 %      RDW-SD 48.5 fl      MPV 9.4 fL      Platelets 303 10*3/mm3     Urine Culture - Urine, Urine, Catheter [[id number]]  (Abnormal)  (Susceptibility) Collected:  08/10/20 2007    Specimen:  Urine, Catheter Updated:  08/12/20 1118     Urine Culture >100,000 CFU/mL Klebsiella pneumoniae ssp pneumoniae    Susceptibility      Klebsiella  pneumoniae ssp pneumoniae     FRANCISCA     Ampicillin Resistant     Ampicillin + Sulbactam Susceptible     Cefazolin Susceptible     Cefepime Susceptible     Ceftazidime Susceptible     Ceftriaxone Susceptible     Gentamicin Susceptible     Levofloxacin Intermediate     Nitrofurantoin Resistant     Piperacillin + Tazobactam Susceptible     Tetracycline Susceptible     Trimethoprim + Sulfamethoxazole Susceptible                    Basic Metabolic Panel [[id number]]  (Abnormal) Collected:  08/12/20 0559    Specimen:  Blood Updated:  08/12/20 0658     Glucose 80 mg/dL      BUN 23 mg/dL      Creatinine 1.92 mg/dL      Sodium 135 mmol/L      Potassium 3.7 mmol/L      Chloride 104 mmol/L      CO2 23.0 mmol/L      Calcium 7.2 mg/dL      eGFR  African Amer 30 mL/min/1.73      eGFR Non African Amer 24 mL/min/1.73      BUN/Creatinine Ratio 12.0     Anion Gap 8.0 mmol/L     Narrative:       GFR Normal >60  Chronic Kidney Disease <60  Kidney Failure <15      CBC (No Diff) [[id number]]  (Abnormal) Collected:  08/12/20 0559    Specimen:  Blood Updated:  08/12/20 0637     WBC 11.30 10*3/mm3      RBC 2.50 10*6/mm3      Hemoglobin 7.6 g/dL      Hematocrit 23.4 %      MCV 93.6 fL      MCH 30.4 pg      MCHC 32.5 g/dL      RDW 14.1 %      RDW-SD 47.7 fl      MPV 9.5 fL      Platelets 281 10*3/mm3         Imaging Results (Last 72 Hours)     Procedure Component Value Units Date/Time    XR Abdomen KUB [[id number]] Collected:  08/14/20 0725     Updated:  08/14/20 0730    Narrative:       Exam: XR ABDOMEN KUB-     Indication: RIGHT kidney stones     Comparison: 8/13/2020     Findings:     Multiple RIGHT renal calculi are faintly visible. These include a 10 mm  calculus projecting over the RIGHT renal pelvis and a 6 mm calculus  projecting over the expected course of the RIGHT ureter at the level of  L4. No left-sided urinary tract calculi. Multiple pelvic phleboliths are  noted. Multilevel lumbar spine degenerative change. LEFT hip  arthroplasty  hardware is partially imaged. No acute osseous finding.  Normal bowel gas pattern. No mass effect.       Impression:       Impression:     Redemonstrated right-sided renal calculi including 10 mm calculus  projecting over the renal pelvis and a 6 mm calculus projecting over the  proximal RIGHT ureter.  This report was finalized on 08/14/2020 07:27 by Dr. Sulaiman Lott MD.    CT Abdomen Pelvis Without Contrast [[id number]] Collected:  08/13/20 1151     Updated:  08/13/20 1222    Narrative:       EXAM: CT ABDOMEN PELVIS WO CONTRAST- - 8/13/2020 11:12 AM CDT     HISTORY: Persistent renal failure, UTI; N17.9-Acute kidney failure,  unspecified; D72.829-Elevated white blood cell count, unspecified;  N39.0-Urinary tract infection, site not specified; D64.9-Anemia,  unspecified       COMPARISON: 4/22/2013.      DOSE LENGTH PRODUCT: 310 mGy cm. Automatic exposure control was utilized  to make radiation dose as low as reasonably achievable.     TECHNIQUE: Unenhanced axial images of the abdomen and pelvis obtained  with coronal and sagittal reformats.     FINDINGS: Evaluation limited secondary to lack of intravenous contrast  agent.      VISUALIZED CHEST: Small to moderate bilateral pleural effusions with  subjacent probable atelectasis. No pericardial effusion.     Motion limited exam.     LIVER: Normal hepatic contour.     BILIARY: Gallstones.     PANCREAS: Atrophic.     SPLEEN: Not enlarged.     ADRENAL: Normal appearance of the bilateral adrenal glands.     GENITOURINARY:   Right hydronephrosis with calcification layering in the right proximal  ureter measuring 14 x 6 mm. There are additional calculi in the right  lower collecting system, suggestive of staghorn calculus.   Urinary bladder is within normal limits.  There appears to be a coarse uterine calcifications suggesting fibroid.     PERITONEUM: Diffuse mesenteric edema without drainable ascites. No free  air.     GI TRACT: Normal configuration of the stomach and  duodenum.   Fluid-filled loops of nondistended small bowel. Large ball of stool at  the rectum measures up to 7.1 cm. Normal appendix on axial images 43-48.     VESSELS: Aorta normal in course and caliber with calcified  atherosclerosis. Limited evaluation of vasculature without intravenous  contrast.     RETROPERITONEUM: No mass, lymphadenopathy or hemorrhage.     SOFT TISSUES: Body wall edema. Increased soft tissue density at the left  medial gluteal region suggesting subcutaneous ulceration.     BONES: Left hip fixation hardware. No acute or suspicious bony finding.  Probable hemangioma of L3, similar compared to 4/22/2013.          Impression:       1. Right hydronephrosis with proximal ureter calcification measuring 14  x 6 mm. Additional calculi at the right lower renal pelvis suggesting  staghorn type calculus.  2. Large ball of stool at the colon measuring 7 cm, consider impaction.  Rectal wall does not appear currently thickened.  3. Fluid-filled loops of small bowel. Exam of the bowel wall is limited  due to motion. Recommend correlation with clinical presentation.  4. Gallstones.  5. Mesenteric edema, body wall edema and small to moderate bilateral  pleural effusions. Correlate with fluid status.  6. Left gluteal decubitus ulcer.  7. Calcified atherosclerosis.  This report was finalized on 08/13/2020 12:19 by Dr Gabriela Atkins MD.          Medication Review:     Current Facility-Administered Medications:   •  acetaminophen (TYLENOL) tablet 650 mg, 650 mg, Oral, Q6H PRN, Forest Clayton MD  •  aspirin chewable tablet 81 mg, 81 mg, Oral, Daily, Forest Clayton MD, 81 mg at 08/13/20 0912  •  atorvastatin (LIPITOR) tablet 20 mg, 20 mg, Oral, Daily, Forest Clayton MD, 20 mg at 08/13/20 0912  •  bisacodyl (DULCOLAX) EC tablet 5 mg, 5 mg, Oral, Daily PRN, Forest Clayton MD  •  brimonidine (ALPHAGAN) 0.2 % ophthalmic solution 1 drop, 1 drop, Both Eyes, BID, Forest Clayton MD, 1 drop at 08/13/20  2010  •  brinzolamide (AZOPT) 1 % ophthalmic suspension 1 drop, 1 drop, Both Eyes, TID, Forest Clayton MD, 1 drop at 08/13/20 2010  •  calcium carb-cholecalciferol 600-800 MG-UNIT tablet 1 tablet, 1 tablet, Oral, BID With Meals, Edmund Recio DO, 1 tablet at 08/13/20 1831  •  cefTRIAXone (ROCEPHIN) 1 g/100 mL 0.9% NS (MBP), 1 g, Intravenous, Q24H, Edmund Recio DO, Stopped at 08/13/20 1647  •  docusate sodium (COLACE) capsule 100 mg, 100 mg, Oral, Daily, Forest Clayton MD, 100 mg at 08/13/20 0912  •  donepezil (ARICEPT) tablet 10 mg, 10 mg, Oral, Nightly, Forest Clayton MD, 10 mg at 08/13/20 2009  •  escitalopram (LEXAPRO) tablet 10 mg, 10 mg, Oral, Daily, Forest Clayton MD, 10 mg at 08/13/20 0912  •  ferrous sulfate tablet 325 mg, 325 mg, Oral, BID With Meals, Forest Clayton MD, 325 mg at 08/13/20 1831  •  HYDROcodone-acetaminophen (NORCO)  MG per tablet 1 tablet, 1 tablet, Oral, Q4H PRN, Forest Clayton MD, 1 tablet at 08/11/20 1519  •  levothyroxine (SYNTHROID, LEVOTHROID) tablet 137 mcg, 137 mcg, Oral, Q AM, Forest Clayton MD, 137 mcg at 08/14/20 0650  •  memantine (NAMENDA) tablet 5 mg, 5 mg, Oral, BID, Forest Clayton MD, 5 mg at 08/13/20 2009  •  multivitamin w/minerals tablet 1 tablet, 1 tablet, Oral, Daily, Forest Clayton MD, 1 tablet at 08/13/20 0912  •  ondansetron (ZOFRAN) tablet 4 mg, 4 mg, Oral, Q6H PRN **OR** ondansetron (ZOFRAN) injection 4 mg, 4 mg, Intravenous, Q6H PRN, Forest Clayton MD  •  polyethylene glycol (MIRALAX) packet 17 g, 17 g, Oral, Daily PRN, Forest Clayton MD  •  QUEtiapine (SEROquel) tablet 25 mg, 25 mg, Oral, Nightly, Edmund Recio DO, 25 mg at 08/13/20 2009  •  sodium chloride 0.45 % 950 mL with sodium bicarbonate 8.4 % 50 mEq infusion, , Intravenous, Continuous, Edmund Recio DO, Last Rate: 60 mL/hr at 08/14/20 0647  •  sodium chloride 0.9 % flush 10 mL, 10 mL, Intravenous, Q12H, Forest Clayton MD, 10 mL at 08/13/20  2010  •  sodium chloride 0.9 % flush 10 mL, 10 mL, Intravenous, PRN, Forest Clayton MD  •  timolol (TIMOPTIC) 0.5 % ophthalmic solution 1 drop, 1 drop, Both Eyes, Q12H, Forest Clayton MD, 1 drop at 08/13/20 2010    Assessment/Plan       Acute renal failure superimposed on stage 3 chronic kidney disease (CMS/HCC)    Dementia with behavioral disturbance (CMS/HCC)    Essential hypertension    Urinary tract infection due to Klebsiella species    Volume depletion    Metabolic acidosis    Hypothyroidism (acquired)    Severe malnutrition (CMS/HCC)    Staghorn calculus    Hydronephrosis of right kidney    Impacted stool in rectum (CMS/HCC)      I had a long discussion with her son, Tirso Walter, regarding her findings.  Her white blood cell count has improved since yesterday.  Her creatinine is mildly elevated from yesterday.  I discussed these findings with her son.  The patient's son would like to proceed with surgery.  We discussed that she will go for cystoscopy with stent placement today.  We will not perform definitive stone therapy given her recent positive urine culture.  She will need an additional surgery for definitive stone therapy in approximately 2 weeks.  We discussed that there is an increased risk that she does not survive the surgery.  She also may not have improvement in the renal function with the placement of the stent.      I discussed the patients findings and my recommendations with patient, family and nursing staff    (Please note that portions of this note were completed with a voice recognition program.)    Carmine Stewart MD  08/14/20  08:44    Time: Time spent: 35 minutes spent performing evaluation and management, chart review, and discussion with patient, > 50% of time spent in face-to-face encounter

## 2020-08-14 NOTE — PROGRESS NOTES
[facility]  INPATIENT PROGRESS NOTE    Patient Name: Andres Walter  Date of Admission: 8/10/2020  Today's Date: 08/14/20  Length of Stay: 0  Primary Care Physician: Jerry Montgomery MD    Subjective   Chief Complaint: UTI, renal failure, staghorn calculus.   HPI   Dr. Stewart has discussed the case with her son. They have agreed to proceed with cystoscopy and right ureteral stent placement.  Serum creatinine slightly increased.  White blood cell count slightly decreased.    No other events overnight per nursing.    Review of Systems   Difficult with her dementia.    Objective    Temp:  [98.1 °F (36.7 °C)-98.4 °F (36.9 °C)] 98.1 °F (36.7 °C)  Heart Rate:  [63-77] 64  Resp:  [16] 16  BP: (113-145)/(41-59) 124/52  Physical Exam  Constitutional: Up in bed.  No distress.  Not agitated or anxious at present. No family present. Discussed with her nurse, Mila Acevedo.   Head: Normocephalic and atraumatic.   Eyes: Pupils are equal, round, and reactive to light. Conjunctivae are normal.   Neck: Neck supple. No JVD present.   Cardiovascular: Normal rate, regular rhythm, normal heart sounds and intact distal pulses. Exam reveals no gallop and no friction rub. No murmur heard.  Pulmonary/Chest: Effort normal and breath sounds normal. No respiratory distress. She has no wheezes. She has no rales. She exhibits no tenderness.   Abdominal: Soft. Bowel sounds are normal. She exhibits no distension. There is no tenderness. There is no rebound and no guarding.   Musculoskeletal: Normal range of motion. She exhibits no edema, tenderness or deformity.   Neurological: She is alert. She displays normal reflexes. She exhibits normal muscle tone.   Skin: Skin is warm and dry. No rash noted.   Psychiatric: Pleasantly confused.     Results Review:  I have reviewed the labs, radiology results, and diagnostic studies.    Laboratory Data:   Results from last 7 days   Lab Units 08/14/20  0662  08/13/20  0833 08/12/20  0559   WBC 10*3/mm3 13.59* 15.05* 11.30*   HEMOGLOBIN g/dL 8.4* 9.3* 7.6*   HEMATOCRIT % 25.4* 29.3* 23.4*   PLATELETS 10*3/mm3 320 303 281     Results from last 7 days   Lab Units 08/14/20  0650 08/13/20  0833 08/12/20  0559 08/11/20  0450  08/10/20  1753   SODIUM mmol/L 134* 134* 135* 136  --  135*   POTASSIUM mmol/L 3.7 4.1 3.7 3.9   < > 4.9   CHLORIDE mmol/L 100 99 104 110*  --  108*   CO2 mmol/L 27.0 20.0* 23.0 18.0*  --  17.0*   BUN mg/dL 26* 26* 23 25*  --  27*   CREATININE mg/dL 2.00* 1.80* 1.92* 1.99*  --  2.21*   CALCIUM mg/dL 7.5* 7.3* 7.2* 7.2*  --  8.0*   BILIRUBIN mg/dL  --   --   --  0.2  --  0.3   ALK PHOS U/L  --   --   --  93  --  122*   ALT (SGPT) U/L  --   --   --  19  --  29   AST (SGOT) U/L  --   --   --  20  --  38*   GLUCOSE mg/dL 118* 39* 80 116*  --  97    < > = values in this interval not displayed.     Culture Data:   Urine Culture   Date Value Ref Range Status   08/10/2020 (A)  Final    >100,000 CFU/mL Klebsiella pneumoniae ssp pneumoniae     Radiology Data:   Imaging Results (Last 24 Hours)     Procedure Component Value Units Date/Time    XR Abdomen KUB [[id number]] Collected:  08/14/20 0725     Updated:  08/14/20 0730    Narrative:       Exam: XR ABDOMEN KUB-     Indication: RIGHT kidney stones     Comparison: 8/13/2020     Findings:     Multiple RIGHT renal calculi are faintly visible. These include a 10 mm  calculus projecting over the RIGHT renal pelvis and a 6 mm calculus  projecting over the expected course of the RIGHT ureter at the level of  L4. No left-sided urinary tract calculi. Multiple pelvic phleboliths are  noted. Multilevel lumbar spine degenerative change. LEFT hip  arthroplasty hardware is partially imaged. No acute osseous finding.  Normal bowel gas pattern. No mass effect.       Impression:       Impression:     Redemonstrated right-sided renal calculi including 10 mm calculus  projecting over the renal pelvis and a 6 mm calculus projecting  over the  proximal RIGHT ureter.  This report was finalized on 08/14/2020 07:27 by Dr. Sulaiman Lott MD.     I have reviewed the patient's current medications.     Assessment/Plan     Active Hospital Problems    Diagnosis   • **Acute renal failure superimposed on stage 3 chronic kidney disease (CMS/HCC)   • Metabolic acidosis   • Staghorn calculus   • Hydronephrosis of right kidney   • Urinary tract infection due to Klebsiella species   • Volume depletion   • Severe malnutrition (CMS/HCC)   • Impacted stool in rectum (CMS/HCC)   • Dementia with behavioral disturbance (CMS/HCC)   • Essential hypertension   • Hypothyroidism (acquired)     Plan:  The patient was admitted on 8/10 by Dr. Clayton.  She presented to the emergency department from her skilled nursing facility with abnormal labs.  She had outpatient labs and found her to be in worsening renal failure.  There are also concerns that she has just been declining in general way.  She has dementia and has been more combative and agitated than usual.  She was also felt to have a urinary tract infection.     She is being treated with ceftriaxone.  Urine culture shows Klebsiella susceptible to ceftriaxone.     Renal function improved with hydration, but had stalled. Able to void, incontinent. Changed the composition of her fluids to include bicarbonate on 8/11.  She has a history of CKD, stage III.  Her most recent serum creatinine in our facility was 1.02 in February 2019. Given her persistent renal failure and persistent leukocytosis, I decided to obtain a CT scan abdomen pelvis this morning that shows right-sided hydronephrosis with a large staghorn calculus. Urology, Dr. Stewart, was consulted for an opinion.  He has spoken to the patient's son about her issues.  They have decided that she will undergo cystoscopy and stent placement today.  He will not try to deal with the stone given her infection.    Enema and disimpaction on 8/13 for fecal  impaction.     Levothyroxine increased slightly secondary to under suppressed TSH.     Other home medications reconciled and resumed as appropriate.  Started low-dose Seroquel at night given her problems with combativeness and agitation. Continue Namenda and Aricept.     Has been tolerating a regular diet per nursing. Supplements added by nutrition. NPO at present for procedure.        SCDs for DVT prophylaxis.     Discharge Planning: Lives at [facility].  Discharge will be planned pending the outcome of her urologic intervention.    Electronically signed by Edmund Recio DO, 08/14/20, 09:36.

## 2020-08-15 PROBLEM — D62 POSTOPERATIVE ANEMIA DUE TO ACUTE BLOOD LOSS: Status: ACTIVE | Noted: 2020-08-15

## 2020-08-15 PROBLEM — A41.59 SEPSIS DUE TO KLEBSIELLA (HCC): Status: ACTIVE | Noted: 2020-08-15

## 2020-08-15 LAB
ABO GROUP BLD: NORMAL
ANION GAP SERPL CALCULATED.3IONS-SCNC: 10 MMOL/L (ref 5–15)
BLD GP AB SCN SERPL QL: NEGATIVE
BUN SERPL-MCNC: 24 MG/DL (ref 8–23)
BUN/CREAT SERPL: 12.2 (ref 7–25)
CALCIUM SPEC-SCNC: 6.9 MG/DL (ref 8.2–9.6)
CHLORIDE SERPL-SCNC: 101 MMOL/L (ref 98–107)
CO2 SERPL-SCNC: 25 MMOL/L (ref 22–29)
CREAT SERPL-MCNC: 1.97 MG/DL (ref 0.57–1)
DEPRECATED RDW RBC AUTO: 46.2 FL (ref 37–54)
ERYTHROCYTE [DISTWIDTH] IN BLOOD BY AUTOMATED COUNT: 13.8 % (ref 12.3–15.4)
GFR SERPL CREATININE-BSD FRML MDRD: 24 ML/MIN/1.73
GFR SERPL CREATININE-BSD FRML MDRD: 29 ML/MIN/1.73
GLUCOSE SERPL-MCNC: 98 MG/DL (ref 65–99)
HCT VFR BLD AUTO: 18.2 % (ref 34–46.6)
HGB BLD-MCNC: 6.1 G/DL (ref 12–15.9)
MCH RBC QN AUTO: 31.1 PG (ref 26.6–33)
MCHC RBC AUTO-ENTMCNC: 33.5 G/DL (ref 31.5–35.7)
MCV RBC AUTO: 92.9 FL (ref 79–97)
PLATELET # BLD AUTO: 174 10*3/MM3 (ref 140–450)
PMV BLD AUTO: 9.7 FL (ref 6–12)
POTASSIUM SERPL-SCNC: 3.6 MMOL/L (ref 3.5–5.2)
RBC # BLD AUTO: 1.96 10*6/MM3 (ref 3.77–5.28)
RH BLD: POSITIVE
SODIUM SERPL-SCNC: 136 MMOL/L (ref 136–145)
T&S EXPIRATION DATE: NORMAL
WBC # BLD AUTO: 27.75 10*3/MM3 (ref 3.4–10.8)

## 2020-08-15 PROCEDURE — 86900 BLOOD TYPING SEROLOGIC ABO: CPT | Performed by: INTERNAL MEDICINE

## 2020-08-15 PROCEDURE — 94799 UNLISTED PULMONARY SVC/PX: CPT

## 2020-08-15 PROCEDURE — 86901 BLOOD TYPING SEROLOGIC RH(D): CPT | Performed by: INTERNAL MEDICINE

## 2020-08-15 PROCEDURE — 86850 RBC ANTIBODY SCREEN: CPT | Performed by: INTERNAL MEDICINE

## 2020-08-15 PROCEDURE — 80048 BASIC METABOLIC PNL TOTAL CA: CPT | Performed by: UROLOGY

## 2020-08-15 PROCEDURE — 99225 PR SBSQ OBSERVATION CARE/DAY 25 MINUTES: CPT | Performed by: UROLOGY

## 2020-08-15 PROCEDURE — 86923 COMPATIBILITY TEST ELECTRIC: CPT

## 2020-08-15 PROCEDURE — 0T768DZ DILATION OF RIGHT URETER WITH INTRALUMINAL DEVICE, VIA NATURAL OR ARTIFICIAL OPENING ENDOSCOPIC: ICD-10-PCS | Performed by: UROLOGY

## 2020-08-15 PROCEDURE — P9047 ALBUMIN (HUMAN), 25%, 50ML: HCPCS | Performed by: INTERNAL MEDICINE

## 2020-08-15 PROCEDURE — 25010000002 ALBUMIN HUMAN 25% PER 50 ML: Performed by: INTERNAL MEDICINE

## 2020-08-15 PROCEDURE — 85027 COMPLETE CBC AUTOMATED: CPT | Performed by: UROLOGY

## 2020-08-15 PROCEDURE — BT1D1ZZ FLUOROSCOPY OF RIGHT KIDNEY, URETER AND BLADDER USING LOW OSMOLAR CONTRAST: ICD-10-PCS | Performed by: UROLOGY

## 2020-08-15 PROCEDURE — 0TF68ZZ FRAGMENTATION IN RIGHT URETER, VIA NATURAL OR ARTIFICIAL OPENING ENDOSCOPIC: ICD-10-PCS | Performed by: UROLOGY

## 2020-08-15 RX ORDER — LORAZEPAM 2 MG/ML
1 CONCENTRATE ORAL EVERY 4 HOURS PRN
Status: DISCONTINUED | OUTPATIENT
Start: 2020-08-15 | End: 2020-08-17 | Stop reason: HOSPADM

## 2020-08-15 RX ORDER — MIDODRINE HYDROCHLORIDE 2.5 MG/1
5 TABLET ORAL ONCE
Status: COMPLETED | OUTPATIENT
Start: 2020-08-15 | End: 2020-08-15

## 2020-08-15 RX ORDER — ALBUMIN (HUMAN) 12.5 G/50ML
25 SOLUTION INTRAVENOUS ONCE
Status: COMPLETED | OUTPATIENT
Start: 2020-08-15 | End: 2020-08-15

## 2020-08-15 RX ORDER — MORPHINE SULFATE 20 MG/ML
5 SOLUTION ORAL
Status: DISCONTINUED | OUTPATIENT
Start: 2020-08-15 | End: 2020-08-17 | Stop reason: HOSPADM

## 2020-08-15 RX ORDER — MIDODRINE HYDROCHLORIDE 2.5 MG/1
5 TABLET ORAL ONCE
Status: DISCONTINUED | OUTPATIENT
Start: 2020-08-15 | End: 2020-08-15

## 2020-08-15 RX ADMIN — LORAZEPAM 1 MG: 2 SOLUTION, CONCENTRATE ORAL at 10:26

## 2020-08-15 RX ADMIN — SODIUM CHLORIDE 500 ML: 9 INJECTION, SOLUTION INTRAVENOUS at 04:33

## 2020-08-15 RX ADMIN — BRIMONIDINE TARTRATE 1 DROP: 2 SOLUTION OPHTHALMIC at 10:25

## 2020-08-15 RX ADMIN — BRINZOLAMIDE 1 DROP: 10 SUSPENSION/ DROPS OPHTHALMIC at 17:07

## 2020-08-15 RX ADMIN — LEVOTHYROXINE SODIUM 137 MCG: 137 TABLET ORAL at 05:38

## 2020-08-15 RX ADMIN — HYDROCODONE BITARTRATE AND ACETAMINOPHEN 1 TABLET: 10; 325 TABLET ORAL at 10:26

## 2020-08-15 RX ADMIN — MIDODRINE HYDROCHLORIDE 5 MG: 2.5 TABLET ORAL at 05:38

## 2020-08-15 RX ADMIN — BRINZOLAMIDE 1 DROP: 10 SUSPENSION/ DROPS OPHTHALMIC at 10:25

## 2020-08-15 RX ADMIN — MEMANTINE HYDROCHLORIDE 5 MG: 5 TABLET, FILM COATED ORAL at 10:26

## 2020-08-15 RX ADMIN — ESCITALOPRAM 10 MG: 10 TABLET, FILM COATED ORAL at 10:26

## 2020-08-15 RX ADMIN — TIMOLOL MALEATE 1 DROP: 5 SOLUTION/ DROPS OPHTHALMIC at 10:25

## 2020-08-15 RX ADMIN — ALBUMIN HUMAN 25 G: 0.25 SOLUTION INTRAVENOUS at 05:38

## 2020-08-15 RX ADMIN — SODIUM CHLORIDE, PRESERVATIVE FREE 10 ML: 5 INJECTION INTRAVENOUS at 10:28

## 2020-08-15 NOTE — PLAN OF CARE
Received as transfer from ICU.  Comfort measures / no CPR, non responsive, room air, murry, will continue to monitor, turn q2    Problem: Patient Care Overview  Goal: Plan of Care Review  Outcome: Ongoing (interventions implemented as appropriate)

## 2020-08-15 NOTE — NURSING NOTE
Went with aid to check vitals around 0330 and had trouble getting a temperature; we eventually got a rectal temperature of 95.4; charge nurse instructed to wrap her in warm blankets and recheck the temperature in 20-30 minutes; recheck temp was 95; MD called, ordered a bear hugger; bear hugger applied and rechecked vitals, BP had dropped from 130s systolic to 90s systolic; manual BP obtained, revealed BP in the 70-80s, a rapid response was immediately called, see code documentation; pt was then transferred to CCU

## 2020-08-15 NOTE — PROGRESS NOTES
LOS: 1 day   Patient Care Team:  Jerry Montgomery MD as PCP - General (Internal Medicine)    Chief Complaint:  Right ureter stones    Subjective     Interval History:     Patient Reports: At baseline. Poor historian.  Patient Denies:  FCR  History taken from: patient family RN    Review of Systems  Pertinent items are noted in HPI, all other systems reviewed and negative     Objective     Vital Signs  Temp:  [96.1 °F (35.6 °C)-100.9 °F (38.3 °C)] 96.1 °F (35.6 °C)  Heart Rate:  [] 74  Resp:  [14-18] 14  BP: ()/(33-77) 109/40    Physical Exam:  Physical Exam At baseline clinically. BP trending down.     Results Review:       Lab Results (last 72 hours)     Procedure Component Value Units Date/Time    Basic Metabolic Panel [[id number]]  (Abnormal) Collected:  08/15/20 0617    Specimen:  Blood Updated:  08/15/20 0706     Glucose 98 mg/dL      BUN 24 mg/dL      Creatinine 1.97 mg/dL      Sodium 136 mmol/L      Potassium 3.6 mmol/L      Chloride 101 mmol/L      CO2 25.0 mmol/L      Calcium 6.9 mg/dL      eGFR  African Amer 29 mL/min/1.73      eGFR Non African Amer 24 mL/min/1.73      BUN/Creatinine Ratio 12.2     Anion Gap 10.0 mmol/L     Narrative:       GFR Normal >60  Chronic Kidney Disease <60  Kidney Failure <15      CBC (No Diff) [[id number]]  (Abnormal) Collected:  08/15/20 0617    Specimen:  Blood Updated:  08/15/20 0701     WBC 27.75 10*3/mm3      RBC 1.96 10*6/mm3      Hemoglobin 6.1 g/dL      Hematocrit 18.2 %      MCV 92.9 fL      MCH 31.1 pg      MCHC 33.5 g/dL      RDW 13.8 %      RDW-SD 46.2 fl      MPV 9.7 fL      Platelets 174 10*3/mm3     Phosphorus [[id number]]  (Abnormal) Collected:  08/14/20 0650    Specimen:  Blood Updated:  08/14/20 0711     Phosphorus 2.3 mg/dL     Magnesium [[id number]]  (Normal) Collected:  08/14/20 0650    Specimen:  Blood Updated:  08/14/20 0711     Magnesium 1.9 mg/dL     Basic Metabolic Panel [[id number]]  (Abnormal) Collected:  08/14/20 0650    Specimen:  Blood  Updated:  08/14/20 0711     Glucose 118 mg/dL      BUN 26 mg/dL      Creatinine 2.00 mg/dL      Sodium 134 mmol/L      Potassium 3.7 mmol/L      Chloride 100 mmol/L      CO2 27.0 mmol/L      Calcium 7.5 mg/dL      eGFR  African Amer 28 mL/min/1.73      eGFR Non African Amer 23 mL/min/1.73      BUN/Creatinine Ratio 13.0     Anion Gap 7.0 mmol/L     Narrative:       GFR Normal >60  Chronic Kidney Disease <60  Kidney Failure <15      CBC (No Diff) [[id number]]  (Abnormal) Collected:  08/14/20 0650    Specimen:  Blood Updated:  08/14/20 0658     WBC 13.59 10*3/mm3      RBC 2.77 10*6/mm3      Hemoglobin 8.4 g/dL      Hematocrit 25.4 %      MCV 91.7 fL      MCH 30.3 pg      MCHC 33.1 g/dL      RDW 13.7 %      RDW-SD 46.8 fl      MPV 9.2 fL      Platelets 320 10*3/mm3     POC Glucose Once [[id number]]  (Abnormal) Collected:  08/13/20 2204    Specimen:  Blood Updated:  08/13/20 2214     Glucose 275 mg/dL      Comment: : [id number] Rohan SydneyMeter ID: [id number]       POC Glucose Once [[id number]]  (Normal) Collected:  08/13/20 1023    Specimen:  Blood Updated:  08/13/20 1035     Glucose 93 mg/dL      Comment: : [id number] Vladimir FonsecaineMeter ID: [id number]       POC Glucose Once [[id number]]  (Abnormal) Collected:  08/13/20 1000    Specimen:  Blood Updated:  08/13/20 1011     Glucose 52 mg/dL      Comment: : [id number] Vladimir AbdiherineMeter ID: [id number]       POC Glucose Once [[id number]]  (Abnormal) Collected:  08/13/20 0943    Specimen:  Blood Updated:  08/13/20 0954     Glucose 54 mg/dL      Comment: : [id number] Vladimir FonsecaineMeter ID: [id number]       Basic Metabolic Panel [[id number]]  (Abnormal) Collected:  08/13/20 0833    Specimen:  Blood Updated:  08/13/20 0918     Glucose 39 mg/dL      BUN 26 mg/dL      Creatinine 1.80 mg/dL      Sodium 134 mmol/L      Potassium 4.1 mmol/L      Comment: Specimen hemolyzed.  Results may be affected.        Chloride 99 mmol/L      CO2 20.0 mmol/L      Calcium  7.3 mg/dL      eGFR  African Amer 32 mL/min/1.73      eGFR Non African Amer 26 mL/min/1.73      BUN/Creatinine Ratio 14.4     Anion Gap 15.0 mmol/L     Narrative:       GFR Normal >60  Chronic Kidney Disease <60  Kidney Failure <15      CBC (No Diff) [[id number]]  (Abnormal) Collected:  08/13/20 0833    Specimen:  Blood Updated:  08/13/20 0901     WBC 15.05 10*3/mm3      RBC 3.09 10*6/mm3      Hemoglobin 9.3 g/dL      Hematocrit 29.3 %      MCV 94.8 fL      MCH 30.1 pg      MCHC 31.7 g/dL      RDW 14.1 %      RDW-SD 48.5 fl      MPV 9.4 fL      Platelets 303 10*3/mm3     Urine Culture - Urine, Urine, Catheter [[id number]]  (Abnormal)  (Susceptibility) Collected:  08/10/20 2007    Specimen:  Urine, Catheter Updated:  08/12/20 1118     Urine Culture >100,000 CFU/mL Klebsiella pneumoniae ssp pneumoniae    Susceptibility      Klebsiella pneumoniae ssp pneumoniae     FRANCISCA     Ampicillin Resistant     Ampicillin + Sulbactam Susceptible     Cefazolin Susceptible     Cefepime Susceptible     Ceftazidime Susceptible     Ceftriaxone Susceptible     Gentamicin Susceptible     Levofloxacin Intermediate     Nitrofurantoin Resistant     Piperacillin + Tazobactam Susceptible     Tetracycline Susceptible     Trimethoprim + Sulfamethoxazole Susceptible                        Imaging Results (Last 72 Hours)     Procedure Component Value Units Date/Time    FL Retrograde Pyelogram In OR [[id number]] Collected:  08/14/20 1215     Updated:  08/14/20 1219    Narrative:       EXAMINATION:   FL RETROGRADE PYELOGRAM IN OR-  8/14/2020 12:15 PM CDT     HISTORY: FL RETROGRADE PYELOGRAM IN OR- 8/14/2020 11:15 AM CDT     HISTORY: stent attempt; N17.9-Acute kidney failure, unspecified;  D72.829-Elevated white blood cell count, unspecified; N39.0-Urinary  tract infection, site not specified; D64.9-Anemia, unspecified;  N20.0-Calculus of kidney     COMPARISON: None     FLUOROSCOPY TIME: 7.9 minutes     FLUOROSCOPY DOSE: 17.1 mGy     NUMBER OF IMAGES:  8       Impression:          Intraoperative fluoroscopic images during attempt at stenting right  ureter.     Please refer to the operative note for more details.  This report was finalized on 08/14/2020 12:16 by Dr. Kota Ruiz MD.    XR Abdomen KUB [[id number]] Collected:  08/14/20 0725     Updated:  08/14/20 0730    Narrative:       Exam: XR ABDOMEN KUB-     Indication: RIGHT kidney stones     Comparison: 8/13/2020     Findings:     Multiple RIGHT renal calculi are faintly visible. These include a 10 mm  calculus projecting over the RIGHT renal pelvis and a 6 mm calculus  projecting over the expected course of the RIGHT ureter at the level of  L4. No left-sided urinary tract calculi. Multiple pelvic phleboliths are  noted. Multilevel lumbar spine degenerative change. LEFT hip  arthroplasty hardware is partially imaged. No acute osseous finding.  Normal bowel gas pattern. No mass effect.       Impression:       Impression:     Redemonstrated right-sided renal calculi including 10 mm calculus  projecting over the renal pelvis and a 6 mm calculus projecting over the  proximal RIGHT ureter.  This report was finalized on 08/14/2020 07:27 by Dr. Sulaiman Lott MD.    CT Abdomen Pelvis Without Contrast [[id number]] Collected:  08/13/20 1151     Updated:  08/13/20 1222    Narrative:       EXAM: CT ABDOMEN PELVIS WO CONTRAST- - 8/13/2020 11:12 AM CDT     HISTORY: Persistent renal failure, UTI; N17.9-Acute kidney failure,  unspecified; D72.829-Elevated white blood cell count, unspecified;  N39.0-Urinary tract infection, site not specified; D64.9-Anemia,  unspecified       COMPARISON: 4/22/2013.      DOSE LENGTH PRODUCT: 310 mGy cm. Automatic exposure control was utilized  to make radiation dose as low as reasonably achievable.     TECHNIQUE: Unenhanced axial images of the abdomen and pelvis obtained  with coronal and sagittal reformats.     FINDINGS: Evaluation limited secondary to lack of intravenous contrast  agent.       VISUALIZED CHEST: Small to moderate bilateral pleural effusions with  subjacent probable atelectasis. No pericardial effusion.     Motion limited exam.     LIVER: Normal hepatic contour.     BILIARY: Gallstones.     PANCREAS: Atrophic.     SPLEEN: Not enlarged.     ADRENAL: Normal appearance of the bilateral adrenal glands.     GENITOURINARY:   Right hydronephrosis with calcification layering in the right proximal  ureter measuring 14 x 6 mm. There are additional calculi in the right  lower collecting system, suggestive of staghorn calculus.   Urinary bladder is within normal limits.  There appears to be a coarse uterine calcifications suggesting fibroid.     PERITONEUM: Diffuse mesenteric edema without drainable ascites. No free  air.     GI TRACT: Normal configuration of the stomach and duodenum.   Fluid-filled loops of nondistended small bowel. Large ball of stool at  the rectum measures up to 7.1 cm. Normal appendix on axial images 43-48.     VESSELS: Aorta normal in course and caliber with calcified  atherosclerosis. Limited evaluation of vasculature without intravenous  contrast.     RETROPERITONEUM: No mass, lymphadenopathy or hemorrhage.     SOFT TISSUES: Body wall edema. Increased soft tissue density at the left  medial gluteal region suggesting subcutaneous ulceration.     BONES: Left hip fixation hardware. No acute or suspicious bony finding.  Probable hemangioma of L3, similar compared to 4/22/2013.          Impression:       1. Right hydronephrosis with proximal ureter calcification measuring 14  x 6 mm. Additional calculi at the right lower renal pelvis suggesting  staghorn type calculus.  2. Large ball of stool at the colon measuring 7 cm, consider impaction.  Rectal wall does not appear currently thickened.  3. Fluid-filled loops of small bowel. Exam of the bowel wall is limited  due to motion. Recommend correlation with clinical presentation.  4. Gallstones.  5. Mesenteric edema, body wall edema  and small to moderate bilateral  pleural effusions. Correlate with fluid status.  6. Left gluteal decubitus ulcer.  7. Calcified atherosclerosis.  This report was finalized on 08/13/2020 12:19 by Dr Gabriela Atkins MD.          Medication Review:     Current Facility-Administered Medications:   •  acetaminophen (TYLENOL) tablet 650 mg, 650 mg, Oral, Q6H PRN, Carmine Stewart MD, 650 mg at 08/14/20 2106  •  aspirin chewable tablet 81 mg, 81 mg, Oral, Daily, Carmine Stewart MD, 81 mg at 08/13/20 0912  •  atorvastatin (LIPITOR) tablet 20 mg, 20 mg, Oral, Daily, Carmine Stewart MD, 20 mg at 08/13/20 0912  •  bisacodyl (DULCOLAX) EC tablet 5 mg, 5 mg, Oral, Daily PRN, Carmine Stewart MD  •  brimonidine (ALPHAGAN) 0.2 % ophthalmic solution 1 drop, 1 drop, Both Eyes, BID, Carmine Stewart MD, 1 drop at 08/14/20 2106  •  brinzolamide (AZOPT) 1 % ophthalmic suspension 1 drop, 1 drop, Both Eyes, TID, Carmine Stewart MD, 1 drop at 08/14/20 2106  •  calcium carb-cholecalciferol 600-800 MG-UNIT tablet 1 tablet, 1 tablet, Oral, BID With Meals, Carmine Stewart MD, 1 tablet at 08/13/20 1831  •  cefTRIAXone (ROCEPHIN) 1 g/100 mL 0.9% NS (MBP), 1 g, Intravenous, Q24H, Carmine Stewart MD, Stopped at 08/13/20 1647  •  docusate sodium (COLACE) capsule 100 mg, 100 mg, Oral, Daily, Carmine Stewart MD, 100 mg at 08/13/20 0912  •  donepezil (ARICEPT) tablet 10 mg, 10 mg, Oral, Nightly, Carmine Stewart MD, 10 mg at 08/14/20 2106  •  escitalopram (LEXAPRO) tablet 10 mg, 10 mg, Oral, Daily, Carmine Stewart MD, 10 mg at 08/13/20 0912  •  ferrous sulfate tablet 325 mg, 325 mg, Oral, BID With Meals, Carmine Stewart MD, 325 mg at 08/13/20 1831  •  HYDROcodone-acetaminophen (NORCO)  MG per tablet 1 tablet, 1 tablet, Oral, Q4H PRN, Carmine Stewart MD, 1 tablet at 08/11/20 1519  •  levothyroxine (SYNTHROID, LEVOTHROID) tablet 137 mcg, 137 mcg, Oral, Q AM, Carmine Stewart MD, 137 mcg at  08/15/20 0538  •  memantine (NAMENDA) tablet 5 mg, 5 mg, Oral, BID, Carmine Stewart MD, 5 mg at 08/14/20 2106  •  multivitamin w/minerals tablet 1 tablet, 1 tablet, Oral, Daily, Carmine Stewart MD, 1 tablet at 08/13/20 0912  •  ondansetron (ZOFRAN) tablet 4 mg, 4 mg, Oral, Q6H PRN **OR** ondansetron (ZOFRAN) injection 4 mg, 4 mg, Intravenous, Q6H PRN, Carmine Stewart MD  •  polyethylene glycol (MIRALAX) packet 17 g, 17 g, Oral, Daily PRN, Carmine Stewart MD  •  QUEtiapine (SEROquel) tablet 25 mg, 25 mg, Oral, Nightly, Carmine Stewart MD, 25 mg at 08/14/20 2106  •  sodium chloride 0.45 % 950 mL with sodium bicarbonate 8.4 % 50 mEq infusion, , Intravenous, Continuous, Carmine Stewart MD, Stopped at 08/15/20 0436  •  sodium chloride 0.9 % flush 10 mL, 10 mL, Intravenous, Q12H, Carmine Stewart MD, 10 mL at 08/14/20 2209  •  sodium chloride 0.9 % flush 10 mL, 10 mL, Intravenous, PRN, Carmine Stewart MD  •  timolol (TIMOPTIC) 0.5 % ophthalmic solution 1 drop, 1 drop, Both Eyes, Q12H, Carmine Stewart MD, 1 drop at 08/14/20 2106    Assessment/Plan       Acute renal failure superimposed on stage 3 chronic kidney disease (CMS/HCC)    Dementia with behavioral disturbance (CMS/HCC)    Essential hypertension    Urinary tract infection due to Klebsiella species    Volume depletion    Metabolic acidosis    Hypothyroidism (acquired)    Severe malnutrition (CMS/HCC)    Staghorn calculus    Hydronephrosis of right kidney    Impacted stool in rectum (CMS/HCC)      Patient developing a relative pancytopenia with worsening leukocytosis. Likely worsening sepsis given vital signs. Could possibly be sequelae from yesterday's stenting attempt but it would not likely be a significant bleed with consumption of platelets. Likely a combination of all factors.    Had a long phone discussion with son regarding implementing comfort measures and code status. He will be on premise in about an hour. I've alerted  Dr. Recio. We discussed a percutaneous nephrostomy tube and the implications of placing that with tube changes, QoL, et cetera. The son does not want a PNT placed at this time.    I discussed the patients findings and my recommendations with patient, family, nursing staff and primary care team    (Please note that portions of this note were completed with a voice recognition program.)    Carmine Stewart MD  08/15/20  07:45    Time: Time spent: 30 minutes spent performing evaluation and management, chart review, and discussion with patient, > 50% of time spent in face-to-face encounter

## 2020-08-15 NOTE — PROGRESS NOTES
[facility] Medicine Services  INPATIENT PROGRESS NOTE    Patient Name: Andres Walter  Date of Admission: 8/10/2020  Today's Date: 08/15/20  Length of Stay: 1  Primary Care Physician: Jerry Montgomery MD    Subjective   Chief Complaint: UTI, renal failure, staghorn calculus.   HPI  Dr. Stewart is unable to place the stent yesterday.  He encountered much difficulty.  Unfortunately, there was some extravasation of contrast on imaging.    She became hypotensive earlier this morning and was moved to the intensive care unit.  She was given Levaquin in addition to ceftriaxone and several boluses of fluid.  She is progressively anemic and has an escalated white blood cell count.  She was also hypothermic.    Her son, Tirso, has arrived from [place] this morning.  Dr. Stewart and I as well as the patient's nurse, Minerva, had a long discussion at the bedside with him this morning.  We told him that under normal circumstances a percutaneous nephrostomy tube would be the next step to drain the kidney and help treat the infection.  He believes that this is too much to put her through.  We agree with him.  He would like to transition efforts to her comfort.    Review of Systems   Difficult with her dementia.    Objective    Temp:  [96.1 °F (35.6 °C)-100.9 °F (38.3 °C)] 96.1 °F (35.6 °C)  Heart Rate:  [] 72  Resp:  [14-18] 15  BP: ()/(33-77) 108/67  Physical Exam  Constitutional: Up in bed.  No distress, very drowsy.  On a rita hugger.  Seen and discussed with her nurse, Minerva.  Head: Normocephalic and atraumatic.   Eyes: Pupils are equal, round, and reactive to light. Conjunctivae are normal.   Neck: Neck supple. No JVD present.   Cardiovascular: Normal rate, regular rhythm, normal heart sounds and intact distal pulses. Exam reveals no gallop and no friction rub. No murmur heard.  Pulmonary/Chest: Effort normal and breath sounds normal. No respiratory distress. She has no  wheezes. She has no rales. She exhibits no tenderness.   Abdominal: Soft. Bowel sounds are normal. She exhibits no distension. There is no tenderness. There is no rebound and no guarding.   Musculoskeletal: Normal range of motion. She exhibits no edema, tenderness or deformity.   Neurological: She is alert. She displays normal reflexes. She exhibits normal muscle tone.   Skin: Skin is warm and dry. No rash noted.   Psychiatric: Pleasantly confused, flat, drowsy.     Results Review:  I have reviewed the labs, radiology results, and diagnostic studies.    Laboratory Data:   Results from last 7 days   Lab Units 08/15/20  0617 08/14/20  0650 08/13/20  0833   WBC 10*3/mm3 27.75* 13.59* 15.05*   HEMOGLOBIN g/dL 6.1* 8.4* 9.3*   HEMATOCRIT % 18.2* 25.4* 29.3*   PLATELETS 10*3/mm3 174 320 303     Results from last 7 days   Lab Units 08/15/20  0617 08/14/20  0650 08/13/20  0833  08/11/20  0450  08/10/20  1753   SODIUM mmol/L 136 134* 134*   < > 136  --  135*   POTASSIUM mmol/L 3.6 3.7 4.1   < > 3.9   < > 4.9   CHLORIDE mmol/L 101 100 99   < > 110*  --  108*   CO2 mmol/L 25.0 27.0 20.0*   < > 18.0*  --  17.0*   BUN mg/dL 24* 26* 26*   < > 25*  --  27*   CREATININE mg/dL 1.97* 2.00* 1.80*   < > 1.99*  --  2.21*   CALCIUM mg/dL 6.9* 7.5* 7.3*   < > 7.2*  --  8.0*   BILIRUBIN mg/dL  --   --   --   --  0.2  --  0.3   ALK PHOS U/L  --   --   --   --  93  --  122*   ALT (SGPT) U/L  --   --   --   --  19  --  29   AST (SGOT) U/L  --   --   --   --  20  --  38*   GLUCOSE mg/dL 98 118* 39*   < > 116*  --  97    < > = values in this interval not displayed.     Culture Data:   Urine Culture   Date Value Ref Range Status   08/10/2020 (A)  Final    >100,000 CFU/mL Klebsiella pneumoniae ssp pneumoniae     Radiology Data:   Imaging Results (Last 24 Hours)     Procedure Component Value Units Date/Time    XR Abdomen KUB [[id number]] Collected:  08/14/20 0725     Updated:  08/14/20 0730    Narrative:       Exam: XR ABDOMEN KUB-     Indication:  RIGHT kidney stones     Comparison: 8/13/2020     Findings:     Multiple RIGHT renal calculi are faintly visible. These include a 10 mm  calculus projecting over the RIGHT renal pelvis and a 6 mm calculus  projecting over the expected course of the RIGHT ureter at the level of  L4. No left-sided urinary tract calculi. Multiple pelvic phleboliths are  noted. Multilevel lumbar spine degenerative change. LEFT hip  arthroplasty hardware is partially imaged. No acute osseous finding.  Normal bowel gas pattern. No mass effect.       Impression:       Impression:     Redemonstrated right-sided renal calculi including 10 mm calculus  projecting over the renal pelvis and a 6 mm calculus projecting over the  proximal RIGHT ureter.  This report was finalized on 08/14/2020 07:27 by Dr. Sulaiman Lott MD.     I have reviewed the patient's current medications.     Assessment/Plan     Active Hospital Problems    Diagnosis   • **Acute renal failure superimposed on stage 3 chronic kidney disease (CMS/HCC)   • Sepsis due to Klebsiella (CMS/HCC)   • Metabolic acidosis   • Staghorn calculus   • Hydronephrosis of right kidney   • Urinary tract infection due to Klebsiella species   • Volume depletion   • Postoperative anemia due to acute blood loss   • Severe malnutrition (CMS/HCC)   • Impacted stool in rectum (CMS/HCC)   • Dementia with behavioral disturbance (CMS/HCC)   • Essential hypertension   • Hypothyroidism (acquired)     Plan:  The patient was admitted on 8/10 by Dr. Clayton.  She presented to the emergency department from her skilled nursing facility with abnormal labs.  She had outpatient labs and found her to be in worsening renal failure.  There are also concerns that she has just been declining in general way.  She has dementia and has been more combative and agitated than usual.  She was also felt to have a urinary tract infection.     She has been treated with ceftriaxone.  Urine culture shows Klebsiella susceptible to  ceftriaxone.     Renal function improved with hydration, but had stalled. Able to void, incontinent. Changed the composition of her fluids to include bicarbonate on 8/11.  She has a history of CKD, stage III.  Her most recent serum creatinine in our facility was 1.02 in February 2019. Given her persistent renal failure and persistent leukocytosis, I decided to obtain a CT scan abdomen pelvis this morning that shows right-sided hydronephrosis with a large staghorn calculus. Urology, Dr. Stewart, was consulted for an opinion.  He spoke to the patient's son about her issues.  They decided that she would undergo cystoscopy and stent on 8/14.  Urology was unable to place the stent.  The stone was impacted.  There was extravasation of contrast.  She became hypotensive with worsening sepsis overnight last night.  She was moved to the intensive care unit.  Her son is come to the bedside this morning.  He does not wish to put her through any further heroic measures and would like to transition her to comfort measures only.    Enema and disimpaction on 8/13 for fecal impaction.     Levothyroxine increased slightly secondary to under suppressed TSH.     Other home medications reconciled and resumed as appropriate.  Started low-dose Seroquel at night given her problems with combativeness and agitation. Continue Namenda and Aricept.     Has been tolerating a regular diet per nursing. Supplements added by nutrition.        SCDs for DVT prophylaxis.     Discontinue all unnecessary medications.  Discontinue IV fluids and IV antibiotics.  Discontinue blood transfusion.  Discontinue routine labs.  Provide sublingual Ativan and morphine as needed for anxiety and pain.  No CPR with comfort measures only.    Discharge Planning: Lives at [place] chronically.  Discussed with her son that she may end up going back there on palliative or hospice care if she remains stable through the weekend.  He understands.    Electronically signed by  Edmund Recio DO, 08/15/20, 09:28.

## 2020-08-15 NOTE — PROGRESS NOTES
Had a family meeting with Tirso Walter, patient's son, Dr. Recio and the patient's current nurse, Minerva, regarding further management. The patient's overall prognosis is grim. The son does not wish to be aggressive at this point. He does not want a percutaneous nephrostomy tube placed. He understands that the infection will likely worsen and be the eventual cause of the patient's demise. He would like comfort measures only at this point.

## 2020-08-16 PROCEDURE — 99225 PR SBSQ OBSERVATION CARE/DAY 25 MINUTES: CPT | Performed by: UROLOGY

## 2020-08-16 PROCEDURE — 25010000002 ONDANSETRON PER 1 MG: Performed by: INTERNAL MEDICINE

## 2020-08-16 RX ADMIN — SODIUM CHLORIDE, PRESERVATIVE FREE 10 ML: 5 INJECTION INTRAVENOUS at 21:27

## 2020-08-16 RX ADMIN — ONDANSETRON HYDROCHLORIDE 4 MG: 2 SOLUTION INTRAMUSCULAR; INTRAVENOUS at 17:05

## 2020-08-16 RX ADMIN — SODIUM CHLORIDE, PRESERVATIVE FREE 10 ML: 5 INJECTION INTRAVENOUS at 08:38

## 2020-08-16 NOTE — PLAN OF CARE
Problem: Patient Care Overview  Goal: Plan of Care Review  Outcome: Ongoing (interventions implemented as appropriate)    Comfort measures continues.  Patient arouses at times.  Patient confused.  Patient's son updated twice by phone.  Patient spoke with son via phone.  Denies pain.  Patient stated she was hungry today and ate approx 20% of lunch.  Refuses meds.  Turn as patient allows.  Cotton with small amount output.  Safety maintained.

## 2020-08-16 NOTE — PROGRESS NOTES
Patient is resting comfortably. Confirmed with nursing staff. Patient has been transferred to the floor. Comfort measures enacted. Palliative care consult pending.    No further urologic care required at this point unless there is some significant change in the patient's disposition suggesting recovery is possible.

## 2020-08-16 NOTE — PLAN OF CARE
Problem: Patient Care Overview  Goal: Plan of Care Review  Outcome: Ongoing (interventions implemented as appropriate)  Flowsheets (Taken 8/16/2020 0418)  Progress: no change  Plan of Care Reviewed With: patient  Outcome Summary:     Patient on comfort care; Unresponsive at the beginning of the shift but later in the shift opened her eyes to voice and communicated briefly with me; Yury; IID; resting between care; will cont to monitor

## 2020-08-16 NOTE — PROGRESS NOTES
[facility] Medicine Services  INPATIENT PROGRESS NOTE    Patient Name: Andres Walter  Date of Admission: 8/10/2020  Today's Date: 08/16/20  Length of Stay: 2  Primary Care Physician: Jerry Montgomery MD    Subjective   Chief Complaint: Comfort measures.  HPI   Nursing feels that she has been comfortable.  She has not required any liquid morphine and has not received any liquid Ativan since yesterday morning. She has aroused a few times and been able to take some pills in a few drinks.    She is sleeping at present.    Nursing confirms that her son did go back to [place] today.  He spent some time with her last night.    When I spoke to him yesterday, he understood that the patient may transition back to her skilled nursing with palliative/hospice care.  We will look into this tomorrow.    Review of Systems   Unobtainable at present secondary to dementia and lethargy.     Objective    Temp:  [97 °F (36.1 °C)-98 °F (36.7 °C)] 98 °F (36.7 °C)  Heart Rate:  [52-71] 71  Resp:  [14] 14  BP: ()/(31-49) 129/49  Physical Exam   Constitutional: No distress.   Up in bed.  No distress.  Sleeping.  No family present.  Discussed with her nurse, Faustina.   HENT:   Head: Normocephalic and atraumatic.   Cardiovascular: Normal rate and regular rhythm.   Pulmonary/Chest: Effort normal. No respiratory distress.   Neurological: No cranial nerve deficit.   Skin: She is not diaphoretic.   Psychiatric:   Sleeping comfortably.  Did not provide much tactile stimulation as to let her rest comfortably at present.   Nursing note and vitals reviewed.    Results Review:  I have reviewed the labs, radiology results, and diagnostic studies.    Laboratory Data:   No new data as on comfort.    I have reviewed the patient's current medications.     Assessment/Plan     Active Hospital Problems    Diagnosis   • **Acute renal failure superimposed on stage 3 chronic kidney disease (CMS/[facility])   • Sepsis due to Klebsiella  (CMS/HCC)   • Metabolic acidosis   • Staghorn calculus   • Hydronephrosis of right kidney   • Urinary tract infection due to Klebsiella species   • Volume depletion   • Postoperative anemia due to acute blood loss   • Severe malnutrition (CMS/HCC)   • Impacted stool in rectum (CMS/HCC)   • Dementia with behavioral disturbance (CMS/HCC)   • Essential hypertension   • Hypothyroidism (acquired)     Plan:  For full details regarding her inpatient course up until today, please refer to my daily progress notes from 8/10-8/15.    Dr. Stewart and I had a long discussion at the bedside with her son, Tirso, on the morning of 8/15.  He elected to make the patient comfort measures only given her comorbidities and dementia.  At that point in time we made the decision to discontinue all unnecessary medications, IV fluids, IV antibiotics, and labs.  Providing sublingual Ativan and morphine if needed for anxiety and pain.  She has not required any liquid morphine thus far.  She received a dose of liquid Ativan yesterday morning.  Scopolamine patch, artificial tears.    Continue with no CPR with comfort measures only.    Discharge Planning: Lives at [place] chronically.  Discussed with her son that she may end up going back there on palliative or hospice care.  Discuss with social work tomorrow after reassessing her status.    Electronically signed by Edmund Recio DO, 08/16/20, 11:06.

## 2020-08-17 VITALS
HEART RATE: 71 BPM | DIASTOLIC BLOOD PRESSURE: 49 MMHG | WEIGHT: 96.5 LBS | OXYGEN SATURATION: 96 % | RESPIRATION RATE: 14 BRPM | HEIGHT: 63 IN | TEMPERATURE: 98 F | SYSTOLIC BLOOD PRESSURE: 129 MMHG | BODY MASS INDEX: 17.1 KG/M2

## 2020-08-17 LAB — SARS-COV-2 RDRP RESP QL NAA+PROBE: NOT DETECTED

## 2020-08-17 PROCEDURE — 87635 SARS-COV-2 COVID-19 AMP PRB: CPT | Performed by: FAMILY MEDICINE

## 2020-08-17 RX ORDER — QUETIAPINE FUMARATE 25 MG/1
25 TABLET, FILM COATED ORAL NIGHTLY
Start: 2020-08-17

## 2020-08-17 RX ORDER — MORPHINE SULFATE 20 MG/ML
5 SOLUTION ORAL
Qty: 180 ML | Refills: 0
Start: 2020-08-17 | End: 2020-08-17

## 2020-08-17 RX ORDER — HYDROCODONE BITARTRATE AND ACETAMINOPHEN 10; 325 MG/1; MG/1
1 TABLET ORAL EVERY 4 HOURS PRN
Qty: 6 TABLET | Refills: 0 | Status: SHIPPED | OUTPATIENT
Start: 2020-08-17

## 2020-08-17 RX ORDER — MORPHINE SULFATE 20 MG/ML
5 SOLUTION ORAL
Qty: 15 ML | Refills: 0 | Status: SHIPPED | OUTPATIENT
Start: 2020-08-17 | End: 2020-08-25

## 2020-08-17 RX ORDER — ONDANSETRON 4 MG/1
4 TABLET, FILM COATED ORAL EVERY 6 HOURS PRN
Start: 2020-08-17

## 2020-08-17 RX ORDER — LORAZEPAM 2 MG/ML
1 CONCENTRATE ORAL EVERY 4 HOURS PRN
Qty: 30 ML | Refills: 0 | Status: SHIPPED | OUTPATIENT
Start: 2020-08-17 | End: 2020-08-25

## 2020-08-17 RX ORDER — LORAZEPAM 2 MG/ML
1 CONCENTRATE ORAL EVERY 4 HOURS PRN
Qty: 30 ML | Refills: 0
Start: 2020-08-17 | End: 2020-08-17

## 2020-08-17 NOTE — PROGRESS NOTES
Continued Stay Note  [facility]     Patient Name: Andres Walter  MRN: [id number]  Today's Date: 8/17/2020    Admit Date: 8/10/2020    Discharge Plan     Row Name 08/17/20 1412       Plan    Plan  [facility]    Patient/Family in Agreement with Plan  yes    Final Discharge Disposition Code  04 - intermediate care facility    Final Note  Pt is returning to [facility] [phone] at the IC level. Left a message for fawad Mcad[facility]. D/C summary and rx has been faxed to them at [phone].         Discharge Codes    No documentation.       Expected Discharge Date and Time     Expected Discharge Date Expected Discharge Time    Aug 17, 2020             REYMUNDO Cisse

## 2020-08-17 NOTE — DISCHARGE SUMMARY
[facility] Medicine Services  DISCHARGE SUMMARY       Date of Admission: 8/10/2020  Date of Discharge:  8/17/2020  Primary Care Physician: Jerry Montgomery MD    Discharge Diagnoses:  Active Hospital Problems    Diagnosis   • **Acute renal failure superimposed on stage 3 chronic kidney disease (CMS/HCC)   • Postoperative anemia due to acute blood loss   • Sepsis due to Klebsiella (CMS/HCC)   • Severe malnutrition (CMS/HCC)   • Staghorn calculus   • Hydronephrosis of right kidney   • Impacted stool in rectum (CMS/HCC)   • Dementia with behavioral disturbance (CMS/HCC)   • Essential hypertension   • Urinary tract infection due to Klebsiella species   • Volume depletion   • Metabolic acidosis   • Hypothyroidism (acquired)         Presenting Problem/History of Present Illness:  Acute kidney injury (CMS/HCC) [N17.9]  Acute kidney injury (CMS/HCC) [N17.9]  Acute kidney injury (CMS/HCC) [N17.9]     Chief Complaint on Day of Discharge:   Palliative care measures    History of Present Illness on Day of Discharge:   Patient has not required any liquid morphine or Ativan to this point.  She is sleeping.  The patient's son has returned to [place] and he understands that the patient is going to transition back to skilled nursing with palliative/hospice care.  She will return to [facility] for continuation of palliative care.    Hospital Course  The patient was admitted on 8/10 presenting to the emergency department from [facility] with abnormal labs noting her to be in worsening renal failure.  She was also experiencing general decline.  It was noted that she has been more combative and agitated than usual combination with her previously diagnosed dementia.  Urine culture was positive for Klebsiella susceptible to ceftriaxone.  Patient was treated with ceftriaxone.  Renal function improved with hydration but did not return to baseline and the patient was voiding incontinently.  CT scan of the abdomen and  pelvis was obtained secondary to persistent renal failure and persistent leukocytosis.  He showed a right-sided hydronephrosis with a large staghorn calculus.  Urology was consulted for a surgical opinion.  Urology discussed with the patient's son about her urological issues and decision was made to proceed with cystoscopy and stent on 8/14.  Urology was unable to place the stent secondary to impaction.  There was noted to be extravasation of contrast.  The patient became more hypotensive with worsening sepsis on the night following the procedure and was moved to the intensive care unit.  The patient's  spoke with Dr. Recio at bedside stating he did not wish to put her through any further heroic measures and wanted to transition her to comfort measures care.  Difficult behaviors and combativeness resolved with initiation of low-dose Seroquel.  She continues on morphine and Ativan but has not required either of those medications for pain or anxiety so far.  Patient is stable for return to SNF for palliative care.      Consults:   Urology    Pertinent Test Results:   Lab Results (last 7 days)     Procedure Component Value Units Date/Time    COVID-19, ABBOTT IN-HOUSE,NP Swab (NO TRANSPORT MEDIA) 2 HR TAT - Swab, Nasopharynx [[id number]]  (Normal) Collected:  08/17/20 0953    Specimen:  Swab from Nasopharynx Updated:  08/17/20 1032     COVID19 Not Detected    Narrative:       Fact sheet for providers: https://www.fda.gov/media/624127/download     Fact sheet for patients: https://www.fda.gov/media/977496/download  Fact sheet for providers: https://www.fda.gov/media/083513/download     Fact sheet for patients: https://www.fda.gov/media/872180/download    Basic Metabolic Panel [[id number]]  (Abnormal) Collected:  08/15/20 0617    Specimen:  Blood Updated:  08/15/20 0706     Glucose 98 mg/dL      BUN 24 mg/dL      Creatinine 1.97 mg/dL      Sodium 136 mmol/L      Potassium 3.6 mmol/L      Chloride 101 mmol/L      CO2  25.0 mmol/L      Calcium 6.9 mg/dL      eGFR  African Amer 29 mL/min/1.73      eGFR Non African Amer 24 mL/min/1.73      BUN/Creatinine Ratio 12.2     Anion Gap 10.0 mmol/L     Narrative:       GFR Normal >60  Chronic Kidney Disease <60  Kidney Failure <15      CBC (No Diff) [[id number]]  (Abnormal) Collected:  08/15/20 0617    Specimen:  Blood Updated:  08/15/20 0701     WBC 27.75 10*3/mm3      RBC 1.96 10*6/mm3      Hemoglobin 6.1 g/dL      Hematocrit 18.2 %      MCV 92.9 fL      MCH 31.1 pg      MCHC 33.5 g/dL      RDW 13.8 %      RDW-SD 46.2 fl      MPV 9.7 fL      Platelets 174 10*3/mm3     Phosphorus [[id number]]  (Abnormal) Collected:  08/14/20 0650    Specimen:  Blood Updated:  08/14/20 0711     Phosphorus 2.3 mg/dL     Magnesium [[id number]]  (Normal) Collected:  08/14/20 0650    Specimen:  Blood Updated:  08/14/20 0711     Magnesium 1.9 mg/dL     Basic Metabolic Panel [[id number]]  (Abnormal) Collected:  08/14/20 0650    Specimen:  Blood Updated:  08/14/20 0711     Glucose 118 mg/dL      BUN 26 mg/dL      Creatinine 2.00 mg/dL      Sodium 134 mmol/L      Potassium 3.7 mmol/L      Chloride 100 mmol/L      CO2 27.0 mmol/L      Calcium 7.5 mg/dL      eGFR  African Amer 28 mL/min/1.73      eGFR Non African Amer 23 mL/min/1.73      BUN/Creatinine Ratio 13.0     Anion Gap 7.0 mmol/L     Narrative:       GFR Normal >60  Chronic Kidney Disease <60  Kidney Failure <15      CBC (No Diff) [[id number]]  (Abnormal) Collected:  08/14/20 0650    Specimen:  Blood Updated:  08/14/20 0658     WBC 13.59 10*3/mm3      RBC 2.77 10*6/mm3      Hemoglobin 8.4 g/dL      Hematocrit 25.4 %      MCV 91.7 fL      MCH 30.3 pg      MCHC 33.1 g/dL      RDW 13.7 %      RDW-SD 46.8 fl      MPV 9.2 fL      Platelets 320 10*3/mm3     POC Glucose Once [[id number]]  (Abnormal) Collected:  08/13/20 2204    Specimen:  Blood Updated:  08/13/20 2214     Glucose 275 mg/dL      Comment: : [id number] [facility] ID: [id number]       POC  Glucose Once [[id number]]  (Normal) Collected:  08/13/20 1023    Specimen:  Blood Updated:  08/13/20 1035     Glucose 93 mg/dL      Comment: : [id number] [facility] ID: [id number]       POC Glucose Once [[id number]]  (Abnormal) Collected:  08/13/20 1000    Specimen:  Blood Updated:  08/13/20 1011     Glucose 52 mg/dL      Comment: : [id number] [facility] ID: [id number]       POC Glucose Once [[id number]]  (Abnormal) Collected:  08/13/20 0943    Specimen:  Blood Updated:  08/13/20 0954     Glucose 54 mg/dL      Comment: : [id number] [facility] ID: [id number]       Basic Metabolic Panel [[id number]]  (Abnormal) Collected:  08/13/20 0833    Specimen:  Blood Updated:  08/13/20 0918     Glucose 39 mg/dL      BUN 26 mg/dL      Creatinine 1.80 mg/dL      Sodium 134 mmol/L      Potassium 4.1 mmol/L      Comment: Specimen hemolyzed.  Results may be affected.        Chloride 99 mmol/L      CO2 20.0 mmol/L      Calcium 7.3 mg/dL      eGFR  African Amer 32 mL/min/1.73      eGFR Non African Amer 26 mL/min/1.73      BUN/Creatinine Ratio 14.4     Anion Gap 15.0 mmol/L     Narrative:       GFR Normal >60  Chronic Kidney Disease <60  Kidney Failure <15      CBC (No Diff) [[id number]]  (Abnormal) Collected:  08/13/20 0833    Specimen:  Blood Updated:  08/13/20 0901     WBC 15.05 10*3/mm3      RBC 3.09 10*6/mm3      Hemoglobin 9.3 g/dL      Hematocrit 29.3 %      MCV 94.8 fL      MCH 30.1 pg      MCHC 31.7 g/dL      RDW 14.1 %      RDW-SD 48.5 fl      MPV 9.4 fL      Platelets 303 10*3/mm3     Urine Culture - Urine, Urine, Catheter [[id number]]  (Abnormal)  (Susceptibility) Collected:  08/10/20 2007    Specimen:  Urine, Catheter Updated:  08/12/20 1118     Urine Culture >100,000 CFU/mL Klebsiella pneumoniae ssp pneumoniae    Susceptibility      Klebsiella pneumoniae ssp pneumoniae     FRANCISCA     Ampicillin Resistant     Ampicillin + Sulbactam Susceptible     Cefazolin Susceptible     Cefepime  Susceptible     Ceftazidime Susceptible     Ceftriaxone Susceptible     Gentamicin Susceptible     Levofloxacin Intermediate     Nitrofurantoin Resistant     Piperacillin + Tazobactam Susceptible     Tetracycline Susceptible     Trimethoprim + Sulfamethoxazole Susceptible                    Basic Metabolic Panel [[id number]]  (Abnormal) Collected:  08/12/20 0559    Specimen:  Blood Updated:  08/12/20 0658     Glucose 80 mg/dL      BUN 23 mg/dL      Creatinine 1.92 mg/dL      Sodium 135 mmol/L      Potassium 3.7 mmol/L      Chloride 104 mmol/L      CO2 23.0 mmol/L      Calcium 7.2 mg/dL      eGFR  African Amer 30 mL/min/1.73      eGFR Non African Amer 24 mL/min/1.73      BUN/Creatinine Ratio 12.0     Anion Gap 8.0 mmol/L     Narrative:       GFR Normal >60  Chronic Kidney Disease <60  Kidney Failure <15      CBC (No Diff) [[id number]]  (Abnormal) Collected:  08/12/20 0559    Specimen:  Blood Updated:  08/12/20 0637     WBC 11.30 10*3/mm3      RBC 2.50 10*6/mm3      Hemoglobin 7.6 g/dL      Hematocrit 23.4 %      MCV 93.6 fL      MCH 30.4 pg      MCHC 32.5 g/dL      RDW 14.1 %      RDW-SD 47.7 fl      MPV 9.5 fL      Platelets 281 10*3/mm3     Lactic Acid, Plasma [[id number]]  (Normal) Collected:  08/11/20 0633    Specimen:  Blood Updated:  08/11/20 0700     Lactate 0.8 mmol/L     Comprehensive Metabolic Panel [[id number]]  (Abnormal) Collected:  08/11/20 0450    Specimen:  Blood Updated:  08/11/20 0653     Glucose 116 mg/dL      BUN 25 mg/dL      Creatinine 1.99 mg/dL      Sodium 136 mmol/L      Potassium 3.9 mmol/L      Chloride 110 mmol/L      CO2 18.0 mmol/L      Calcium 7.2 mg/dL      Total Protein 5.0 g/dL      Albumin 1.90 g/dL      ALT (SGPT) 19 U/L      AST (SGOT) 20 U/L      Alkaline Phosphatase 93 U/L      Total Bilirubin 0.2 mg/dL      eGFR Non African Amer 23 mL/min/1.73      eGFR  African Amer 28 mL/min/1.73      Globulin 3.1 gm/dL      A/G Ratio 0.6 g/dL      BUN/Creatinine Ratio 12.6     Anion Gap 8.0  mmol/L     Narrative:       GFR Normal >60  Chronic Kidney Disease <60  Kidney Failure <15      CBC Auto Differential [[id number]]  (Abnormal) Collected:  08/11/20 0450    Specimen:  Blood Updated:  08/11/20 0605     WBC 11.30 10*3/mm3      RBC 2.45 10*6/mm3      Hemoglobin 7.5 g/dL      Hematocrit 22.9 %      MCV 93.5 fL      MCH 30.6 pg      MCHC 32.8 g/dL      RDW 14.0 %      RDW-SD 48.0 fl      MPV 9.6 fL      Platelets 294 10*3/mm3      Neutrophil % 72.5 %      Lymphocyte % 19.6 %      Monocyte % 5.8 %      Eosinophil % 1.1 %      Basophil % 0.3 %      Immature Grans % 0.7 %      Neutrophils, Absolute 8.19 10*3/mm3      Lymphocytes, Absolute 2.22 10*3/mm3      Monocytes, Absolute 0.66 10*3/mm3      Eosinophils, Absolute 0.12 10*3/mm3      Basophils, Absolute 0.03 10*3/mm3      Immature Grans, Absolute 0.08 10*3/mm3      nRBC 0.0 /100 WBC     Magnesium [[id number]]  (Normal) Collected:  08/10/20 2010    Specimen:  Blood Updated:  08/11/20 0122     Magnesium 1.9 mg/dL     Phosphorus [[id number]]  (Abnormal) Collected:  08/10/20 2010    Specimen:  Blood Updated:  08/11/20 0122     Phosphorus 2.1 mg/dL     TSH [[id number]]  (Abnormal) Collected:  08/10/20 2010    Specimen:  Blood Updated:  08/11/20 0122     TSH 16.130 uIU/mL     Potassium [[id number]]  (Normal) Collected:  08/10/20 2010    Specimen:  Blood Updated:  08/10/20 2027     Potassium 4.2 mmol/L     Urinalysis, Microscopic Only - Urine, Catheter [[id number]]  (Abnormal) Collected:  08/10/20 2007    Specimen:  Urine, Catheter Updated:  08/10/20 2026     RBC, UA 6-12 /HPF      WBC, UA Too Numerous to Count /HPF      Bacteria, UA 4+ /HPF      Squamous Epithelial Cells, UA None Seen /HPF      Hyaline Casts, UA 3-6 /LPF      Methodology Automated Microscopy    Urinalysis With Culture If Indicated - Urine, Catheter [[id number]]  (Abnormal) Collected:  08/10/20 2007    Specimen:  Urine, Catheter Updated:  08/10/20 2026     Color, UA Yellow     Appearance, UA Cloudy      pH, UA 5.5     Specific Gravity, UA 1.025     Glucose, UA Negative     Ketones, UA Negative     Bilirubin, UA Negative     Blood, UA Moderate (2+)     Protein, UA 30 mg/dL (1+)     Leuk Esterase, UA Moderate (2+)     Nitrite, UA Negative     Urobilinogen, UA 0.2 E.U./dL    COVID PRE-OP / PRE-PROCEDURE SCREENING ORDER (NO ISOLATION) - Swab, Nasopharynx [[id number]] Collected:  08/10/20 1734    Specimen:  Swab from Nasopharynx Updated:  08/10/20 1847    Narrative:       The following orders were created for panel order COVID PRE-OP / PRE-PROCEDURE SCREENING ORDER (NO ISOLATION) - Swab, Nasopharynx.  Procedure                               Abnormality         Status                     ---------                               -----------         ------                     COVID-19,CEPHEID,COR/KASSIDY...[[id number]]  Normal              Final result                 Please view results for these tests on the individual orders.    COVID-19,CEPHEID,COR/KASSIDY/PAD IN-HOUSE(OR EMERGENT/ADD-ON),NP SWAB IN TRANSPORT MEDIA 3-4 HR TAT - Swab, Nasopharynx [[id number]]  (Normal) Collected:  08/10/20 1734    Specimen:  Swab from Nasopharynx Updated:  08/10/20 1847     COVID19 Not Detected    Narrative:       Fact sheet for providers: https://www.fda.gov/media/731689/download     Fact sheet for patients: https://www.fda.gov/media/281220/download    Comprehensive Metabolic Panel [[id number]]  (Abnormal) Collected:  08/10/20 1753    Specimen:  Blood Updated:  08/10/20 1842     Glucose 97 mg/dL      BUN 27 mg/dL      Creatinine 2.21 mg/dL      Sodium 135 mmol/L      Potassium 4.9 mmol/L      Comment: Specimen hemolyzed.  Results may be affected.        Chloride 108 mmol/L      CO2 17.0 mmol/L      Calcium 8.0 mg/dL      Total Protein 6.3 g/dL      Albumin 2.10 g/dL      ALT (SGPT) 29 U/L      AST (SGOT) 38 U/L      Comment: Specimen hemolyzed.  Results may be affected.        Alkaline Phosphatase 122 U/L      Total Bilirubin 0.3 mg/dL      eGFR  Non  Amer 21 mL/min/1.73      eGFR  African Amer 25 mL/min/1.73      Globulin 4.2 gm/dL      A/G Ratio 0.5 g/dL      BUN/Creatinine Ratio 12.2     Anion Gap 10.0 mmol/L     Narrative:       GFR Normal >60  Chronic Kidney Disease <60  Kidney Failure <15      Troponin [[id number]]  (Abnormal) Collected:  08/10/20 1753    Specimen:  Blood Updated:  08/10/20 1841     Troponin T 0.068 ng/mL     Narrative:       Troponin T Reference Range:  <= 0.03 ng/mL-   Negative for AMI  >0.03 ng/mL-     Abnormal for myocardial necrosis.  Clinicians would have to utilize clinical acumen, EKG, Troponin and serial changes to determine if it is an Acute Myocardial Infarction or myocardial injury due to an underlying chronic condition.       Results may be falsely decreased if patient taking Biotin.      CK [[id number]]  (Normal) Collected:  08/10/20 1753    Specimen:  Blood Updated:  08/10/20 1837     Creatine Kinase 37 U/L     CBC & Differential [[id number]] Collected:  08/10/20 1753    Specimen:  Blood Updated:  08/10/20 1813    Narrative:       The following orders were created for panel order CBC & Differential.  Procedure                               Abnormality         Status                     ---------                               -----------         ------                     CBC Auto Differential[[id number]]        Abnormal            Final result                 Please view results for these tests on the individual orders.    CBC Auto Differential [[id number]]  (Abnormal) Collected:  08/10/20 1753    Specimen:  Blood Updated:  08/10/20 1813     WBC 18.73 10*3/mm3      RBC 2.99 10*6/mm3      Hemoglobin 9.0 g/dL      Hematocrit 28.0 %      MCV 93.6 fL      MCH 30.1 pg      MCHC 32.1 g/dL      RDW 14.3 %      RDW-SD 48.3 fl      MPV 9.6 fL      Platelets 373 10*3/mm3      Neutrophil % 77.8 %      Lymphocyte % 16.2 %      Monocyte % 4.5 %      Eosinophil % 0.8 %      Basophil % 0.2 %      Immature Grans % 0.5 %       Neutrophils, Absolute 14.56 10*3/mm3      Lymphocytes, Absolute 3.04 10*3/mm3      Monocytes, Absolute 0.84 10*3/mm3      Eosinophils, Absolute 0.15 10*3/mm3      Basophils, Absolute 0.04 10*3/mm3      Immature Grans, Absolute 0.10 10*3/mm3      nRBC 0.0 /100 WBC         Imaging Results (Last 7 Days)     Procedure Component Value Units Date/Time    FL Retrograde Pyelogram In OR [[id number]] Collected:  08/14/20 1215     Updated:  08/14/20 1219    Narrative:       EXAMINATION:   FL RETROGRADE PYELOGRAM IN OR-  8/14/2020 12:15 PM CDT     HISTORY: FL RETROGRADE PYELOGRAM IN OR- 8/14/2020 11:15 AM CDT     HISTORY: stent attempt; N17.9-Acute kidney failure, unspecified;  D72.829-Elevated white blood cell count, unspecified; N39.0-Urinary  tract infection, site not specified; D64.9-Anemia, unspecified;  N20.0-Calculus of kidney     COMPARISON: None     FLUOROSCOPY TIME: 7.9 minutes     FLUOROSCOPY DOSE: 17.1 mGy     NUMBER OF IMAGES: 8       Impression:          Intraoperative fluoroscopic images during attempt at stenting right  ureter.     Please refer to the operative note for more details.  This report was finalized on 08/14/2020 12:16 by Dr. Kota Ruiz MD.    XR Abdomen KUB [[id number]] Collected:  08/14/20 0725     Updated:  08/14/20 0730    Narrative:       Exam: XR ABDOMEN KUB-     Indication: RIGHT kidney stones     Comparison: 8/13/2020     Findings:     Multiple RIGHT renal calculi are faintly visible. These include a 10 mm  calculus projecting over the RIGHT renal pelvis and a 6 mm calculus  projecting over the expected course of the RIGHT ureter at the level of  L4. No left-sided urinary tract calculi. Multiple pelvic phleboliths are  noted. Multilevel lumbar spine degenerative change. LEFT hip  arthroplasty hardware is partially imaged. No acute osseous finding.  Normal bowel gas pattern. No mass effect.       Impression:       Impression:     Redemonstrated right-sided renal calculi including 10 mm  calculus  projecting over the renal pelvis and a 6 mm calculus projecting over the  proximal RIGHT ureter.  This report was finalized on 08/14/2020 07:27 by Dr. Sulaiman Lott MD.    CT Abdomen Pelvis Without Contrast [[id number]] Collected:  08/13/20 1151     Updated:  08/13/20 1222    Narrative:       EXAM: CT ABDOMEN PELVIS WO CONTRAST- - 8/13/2020 11:12 AM CDT     HISTORY: Persistent renal failure, UTI; N17.9-Acute kidney failure,  unspecified; D72.829-Elevated white blood cell count, unspecified;  N39.0-Urinary tract infection, site not specified; D64.9-Anemia,  unspecified       COMPARISON: 4/22/2013.      DOSE LENGTH PRODUCT: 310 mGy cm. Automatic exposure control was utilized  to make radiation dose as low as reasonably achievable.     TECHNIQUE: Unenhanced axial images of the abdomen and pelvis obtained  with coronal and sagittal reformats.     FINDINGS: Evaluation limited secondary to lack of intravenous contrast  agent.      VISUALIZED CHEST: Small to moderate bilateral pleural effusions with  subjacent probable atelectasis. No pericardial effusion.     Motion limited exam.     LIVER: Normal hepatic contour.     BILIARY: Gallstones.     PANCREAS: Atrophic.     SPLEEN: Not enlarged.     ADRENAL: Normal appearance of the bilateral adrenal glands.     GENITOURINARY:   Right hydronephrosis with calcification layering in the right proximal  ureter measuring 14 x 6 mm. There are additional calculi in the right  lower collecting system, suggestive of staghorn calculus.   Urinary bladder is within normal limits.  There appears to be a coarse uterine calcifications suggesting fibroid.     PERITONEUM: Diffuse mesenteric edema without drainable ascites. No free  air.     GI TRACT: Normal configuration of the stomach and duodenum.   Fluid-filled loops of nondistended small bowel. Large ball of stool at  the rectum measures up to 7.1 cm. Normal appendix on axial images 43-48.     VESSELS: Aorta normal in course and  caliber with calcified  atherosclerosis. Limited evaluation of vasculature without intravenous  contrast.     RETROPERITONEUM: No mass, lymphadenopathy or hemorrhage.     SOFT TISSUES: Body wall edema. Increased soft tissue density at the left  medial gluteal region suggesting subcutaneous ulceration.     BONES: Left hip fixation hardware. No acute or suspicious bony finding.  Probable hemangioma of L3, similar compared to 4/22/2013.          Impression:       1. Right hydronephrosis with proximal ureter calcification measuring 14  x 6 mm. Additional calculi at the right lower renal pelvis suggesting  staghorn type calculus.  2. Large ball of stool at the colon measuring 7 cm, consider impaction.  Rectal wall does not appear currently thickened.  3. Fluid-filled loops of small bowel. Exam of the bowel wall is limited  due to motion. Recommend correlation with clinical presentation.  4. Gallstones.  5. Mesenteric edema, body wall edema and small to moderate bilateral  pleural effusions. Correlate with fluid status.  6. Left gluteal decubitus ulcer.  7. Calcified atherosclerosis.  This report was finalized on 08/13/2020 12:19 by Dr Gabriela Atkins MD.    XR Chest 1 View [[id number]] Collected:  08/10/20 2031     Updated:  08/10/20 2035    Narrative:       EXAMINATION: XR CHEST 1 VW-     8/10/2020 8:17 PM CDT     HISTORY: hyperkalemia     1 view chest x-ray compared with 2/1/2018.     Heart size is normal.  The mediastinum is within normal limits.      The lungs are mildly hyperexpanded with no pneumonia or pneumothorax.  Mild chronic appearing interstitial disease with a few calcified  granulomas.  No congestive failure changes.                                                                       Impression:       1. No acute disease.        This report was finalized on 08/10/2020 20:32 by Dr. Fletcher Hylton MD.              Condition on Discharge:    Declininig    Physical Exam on Discharge:  /49 (BP Location:  "Left arm, Patient Position: Lying)   Pulse 71   Temp 98 °F (36.7 °C) (Axillary)   Resp 14   Ht 160 cm (63\")   Wt 43.8 kg (96 lb 8 oz)   SpO2 96%   Breastfeeding No   BMI 17.09 kg/m²   Physical Exam     No distress.  Sleeping.  No family present.     HENT:   Head: Normocephalic and atraumatic.   Cardiovascular: Normal rate and regular rhythm.   Pulmonary/Chest: Effort normal. No respiratory distress.   Neurological: No cranial nerve deficit.   Skin: She is not diaphoretic.   Psychiatric:   Sleeping comfortably.      Discharge Disposition:  Skilled Nursing Facility (DC - External)    Discharge Medications:     Discharge Medications      New Medications      Instructions Start Date   LORazepam 2 MG/ML concentrated solution  Commonly known as:  ATIVAN   1 mg, Oral, Every 4 Hours PRN      morphine 100 MG/5ML solution concentrated solution   5 mg, Oral, Every 3 Hours PRN      ondansetron 4 MG tablet  Commonly known as:  ZOFRAN   4 mg, Oral, Every 6 Hours PRN      QUEtiapine 25 MG tablet  Commonly known as:  SEROquel   25 mg, Oral, Nightly         Continue These Medications      Instructions Start Date   acetaminophen 325 MG tablet  Commonly known as:  TYLENOL   650 mg, Oral, Every 4 Hours PRN      HYDROcodone-acetaminophen  MG per tablet  Commonly known as:  NORCO   1 tablet, Oral, Every 4 Hours PRN         Stop These Medications    atorvastatin 20 MG tablet  Commonly known as:  LIPITOR     bisacodyl 5 MG EC tablet  Commonly known as:  DULCOLAX     bisoprolol-hydrochlorothiazide 5-6.25 MG per tablet  Commonly known as:  ZIAC     brimonidine 0.2 % ophthalmic solution  Commonly known as:  ALPHAGAN     brinzolamide 1 % ophthalmic suspension  Commonly known as:  AZOPT     Calcium-Vitamin D 600-400 MG-UNIT tablet     docusate sodium 100 MG capsule  Commonly known as:  COLACE     donepezil 10 MG tablet  Commonly known as:  ARICEPT     escitalopram 10 MG tablet  Commonly known as:  LEXAPRO     ferrous sulfate 325 " (65 FE) MG tablet     levothyroxine sodium 125 MCG capsule capsule  Commonly known as:  TIROSINT     memantine 5 MG tablet  Commonly known as:  NAMENDA     multivitamin tablet tablet     polyethylene glycol packet  Commonly known as:  MIRALAX     timolol 0.5 % ophthalmic solution  Commonly known as:  TIMOPTIC            Discharge Diet:   Diet Instructions     Advance Diet As Tolerated            Discharge Care Plan / Instructions:   Discharged to SNF for continuation of palliative care    Activity at Discharge:   Activity Instructions     Activity as Tolerated             Electronically signed by Stewart Darden DO, 08/17/20, 12:19.    Time: Discharge over 30 min    Please note that part of this note may be an electronic transcription/translation of spoken language to printed text using the Dragon Dictation System. Efforts were made to edit the dictations, but occasionally words are mistranscribed.

## 2020-08-17 NOTE — PLAN OF CARE
Problem: Patient Care Overview  Goal: Plan of Care Review  Flowsheets (Taken 8/17/2020 [id number])  Outcome Summary: Called report to Alejandra at [place]. Notified son of patients discharge.

## 2020-08-19 LAB
BH BB BLOOD EXPIRATION DATE: NORMAL
BH BB BLOOD TYPE BARCODE: 6200
BH BB DISPENSE STATUS: NORMAL
BH BB PRODUCT CODE: NORMAL
BH BB UNIT NUMBER: NORMAL
CROSSMATCH INTERPRETATION: NORMAL
UNIT  ABO: NORMAL
UNIT  RH: NORMAL

## (undated) DEVICE — GLV SURG TRIUMPH ORTHO W/ALOE PF LTX 7.5 STRL

## (undated) DEVICE — GLW STFF STR 3CM .035X150CM

## (undated) DEVICE — URETERAL STENT
Type: IMPLANTABLE DEVICE | Site: URETER | Status: NON-FUNCTIONAL
Brand: PERCUFLEX™
Removed: 2020-08-14

## (undated) DEVICE — CATHETER,FOLEY,SILI-ELAST,LTX,18FR,10ML: Brand: MEDLINE

## (undated) DEVICE — URETERAL STENT
Type: IMPLANTABLE DEVICE | Site: URETER | Status: NON-FUNCTIONAL
Brand: PERCUFLEX™ PLUS
Removed: 2020-08-14

## (undated) DEVICE — PK TURNOVER RM ADV

## (undated) DEVICE — HEWSON SUTURE RETRIEVER: Brand: HEWSON SUTURE RETRIEVER

## (undated) DEVICE — PK HIP TOTL 30

## (undated) DEVICE — GLW STD STR 3CM .035X150CM

## (undated) DEVICE — UNDERGLV SURG BIOGEL INDICAT PF 71/2 GRN

## (undated) DEVICE — PROXIMATE RH ROTATING HEAD SKIN STAPLERS (35 WIDE) CONTAINS 35 STAINLESS STEEL STAPLES: Brand: PROXIMATE

## (undated) DEVICE — ANTIBACTERIAL UNDYED BRAIDED (POLYGLACTIN 910), SYNTHETIC ABSORBABLE SUTURE: Brand: COATED VICRYL

## (undated) DEVICE — GLV SURG TRIUMPH GREEN W/ALOE PF LTX 8 STRL

## (undated) DEVICE — PK CYSTO 30

## (undated) DEVICE — GLV SURG TRIUMPH MICRO PF LTX 7.5 STRL

## (undated) DEVICE — DRAINBAG,ANTI-REFLUX TOWER,L/F,2000ML,LL: Brand: MEDLINE

## (undated) DEVICE — CATH URETRL OPN/END 5F70CM

## (undated) DEVICE — ENDOSCOPIC SEAL URO 1 SIZE FITS ALL: Brand: ENDOSCOPIC SEAL

## (undated) DEVICE — 4-PORT MANIFOLD: Brand: NEPTUNE 2

## (undated) DEVICE — GLV SURG BIOGEL M LTX PF 7 1/2

## (undated) DEVICE — HANDPIECE SET WITH HIGH FLOW TIP AND SUCTION TUBE: Brand: INTERPULSE

## (undated) DEVICE — 3M™ WARMING BLANKET, UPPER BODY, 10 PER CASE, 42268: Brand: BAIR HUGGER™

## (undated) DEVICE — BLD SAW AGGR 1.27X19X90MM STRL

## (undated) DEVICE — PK TURNOVER CYSTO RM

## (undated) DEVICE — GOWN,PREVENTION PLUS,XLONG/XLARGE,STRL: Brand: MEDLINE

## (undated) DEVICE — URETERAL STENT
Type: IMPLANTABLE DEVICE | Site: URETER | Status: NON-FUNCTIONAL
Brand: PERCUFLEX™ PLUS

## (undated) DEVICE — SYS SKIN CLS DERMABOND PRINEO W/22CM MESH TP

## (undated) DEVICE — DRAPE,U/ SHT,SPLIT,PLAS,STERIL: Brand: MEDLINE